# Patient Record
Sex: MALE | Race: WHITE | Employment: OTHER | URBAN - METROPOLITAN AREA
[De-identification: names, ages, dates, MRNs, and addresses within clinical notes are randomized per-mention and may not be internally consistent; named-entity substitution may affect disease eponyms.]

---

## 2017-11-24 ENCOUNTER — HOSPITAL ENCOUNTER (INPATIENT)
Age: 67
LOS: 4 days | Discharge: HOME | DRG: 287 | End: 2017-11-28
Attending: EMERGENCY MEDICINE | Admitting: FAMILY MEDICINE
Payer: MEDICARE

## 2017-11-24 ENCOUNTER — APPOINTMENT (OUTPATIENT)
Dept: GENERAL RADIOLOGY | Age: 67
DRG: 287 | End: 2017-11-24
Attending: EMERGENCY MEDICINE
Payer: MEDICARE

## 2017-11-24 DIAGNOSIS — E87.6 HYPOKALEMIA: ICD-10-CM

## 2017-11-24 DIAGNOSIS — I24.9 ACS (ACUTE CORONARY SYNDROME) (HCC): ICD-10-CM

## 2017-11-24 DIAGNOSIS — I48.91 ATRIAL FIBRILLATION, UNSPECIFIED TYPE (HCC): Primary | ICD-10-CM

## 2017-11-24 PROBLEM — I50.33 DIASTOLIC CHF, ACUTE ON CHRONIC (HCC): Status: ACTIVE | Noted: 2017-11-24

## 2017-11-24 PROBLEM — R77.8 ELEVATED TROPONIN: Status: ACTIVE | Noted: 2017-11-24

## 2017-11-24 PROBLEM — R06.00 DYSPNEA: Status: ACTIVE | Noted: 2017-11-24

## 2017-11-24 LAB
ALBUMIN SERPL-MCNC: 3.5 G/DL (ref 3.4–5)
ALBUMIN/GLOB SERPL: 1.1 {RATIO} (ref 0.8–1.7)
ALP SERPL-CCNC: 70 U/L (ref 45–117)
ALT SERPL-CCNC: 29 U/L (ref 16–61)
ANION GAP SERPL CALC-SCNC: 7 MMOL/L (ref 3–18)
APPEARANCE UR: CLEAR
APTT PPP: >180 SEC (ref 23–36.4)
AST SERPL-CCNC: 28 U/L (ref 15–37)
BACTERIA URNS QL MICRO: ABNORMAL /HPF
BASOPHILS # BLD: 0 K/UL (ref 0–0.06)
BASOPHILS NFR BLD: 1 % (ref 0–2)
BILIRUB SERPL-MCNC: 0.8 MG/DL (ref 0.2–1)
BILIRUB UR QL: NEGATIVE
BNP SERPL-MCNC: 2575 PG/ML (ref 0–900)
BUN SERPL-MCNC: 14 MG/DL (ref 7–18)
BUN/CREAT SERPL: 11 (ref 12–20)
CALCIUM SERPL-MCNC: 8.7 MG/DL (ref 8.5–10.1)
CHLORIDE SERPL-SCNC: 108 MMOL/L (ref 100–108)
CK MB CFR SERPL CALC: 0.6 % (ref 0–4)
CK MB CFR SERPL CALC: 0.8 % (ref 0–4)
CK MB CFR SERPL CALC: ABNORMAL % (ref 0–4)
CK MB SERPL-MCNC: 1.3 NG/ML (ref 5–25)
CK MB SERPL-MCNC: 1.7 NG/ML (ref 5–25)
CK MB SERPL-MCNC: <1 NG/ML (ref 5–25)
CK SERPL-CCNC: 182 U/L (ref 39–308)
CK SERPL-CCNC: 216 U/L (ref 39–308)
CK SERPL-CCNC: 218 U/L (ref 39–308)
CO2 SERPL-SCNC: 24 MMOL/L (ref 21–32)
COLOR UR: YELLOW
CREAT SERPL-MCNC: 1.24 MG/DL (ref 0.6–1.3)
D DIMER PPP FEU-MCNC: 0.48 UG/ML(FEU)
DIFFERENTIAL METHOD BLD: ABNORMAL
EOSINOPHIL # BLD: 0.2 K/UL (ref 0–0.4)
EOSINOPHIL NFR BLD: 3 % (ref 0–5)
EPITH CASTS URNS QL MICRO: ABNORMAL /LPF (ref 0–5)
ERYTHROCYTE [DISTWIDTH] IN BLOOD BY AUTOMATED COUNT: 13.3 % (ref 11.6–14.5)
GLOBULIN SER CALC-MCNC: 3.3 G/DL (ref 2–4)
GLUCOSE SERPL-MCNC: 118 MG/DL (ref 74–99)
GLUCOSE UR STRIP.AUTO-MCNC: NEGATIVE MG/DL
HBA1C MFR BLD: 5 % (ref 4.5–5.6)
HCT VFR BLD AUTO: 49.4 % (ref 36–48)
HGB BLD-MCNC: 17.4 G/DL (ref 13–16)
HGB UR QL STRIP: NEGATIVE
INR PPP: 1 (ref 0.8–1.2)
KETONES UR QL STRIP.AUTO: NEGATIVE MG/DL
LEUKOCYTE ESTERASE UR QL STRIP.AUTO: ABNORMAL
LYMPHOCYTES # BLD: 0.9 K/UL (ref 0.9–3.6)
LYMPHOCYTES NFR BLD: 11 % (ref 21–52)
MAGNESIUM SERPL-MCNC: 1.8 MG/DL (ref 1.6–2.6)
MCH RBC QN AUTO: 31.1 PG (ref 24–34)
MCHC RBC AUTO-ENTMCNC: 35.2 G/DL (ref 31–37)
MCV RBC AUTO: 88.4 FL (ref 74–97)
MONOCYTES # BLD: 0.8 K/UL (ref 0.05–1.2)
MONOCYTES NFR BLD: 9 % (ref 3–10)
NEUTS SEG # BLD: 6.7 K/UL (ref 1.8–8)
NEUTS SEG NFR BLD: 76 % (ref 40–73)
NITRITE UR QL STRIP.AUTO: POSITIVE
PH UR STRIP: 6.5 [PH] (ref 5–8)
PLATELET # BLD AUTO: 186 K/UL (ref 135–420)
PMV BLD AUTO: 11.8 FL (ref 9.2–11.8)
POTASSIUM SERPL-SCNC: 3.4 MMOL/L (ref 3.5–5.5)
PROT SERPL-MCNC: 6.8 G/DL (ref 6.4–8.2)
PROT UR STRIP-MCNC: NEGATIVE MG/DL
PROTHROMBIN TIME: 13 SEC (ref 11.5–15.2)
RBC # BLD AUTO: 5.59 M/UL (ref 4.7–5.5)
RBC #/AREA URNS HPF: ABNORMAL /HPF (ref 0–5)
SODIUM SERPL-SCNC: 139 MMOL/L (ref 136–145)
SP GR UR REFRACTOMETRY: 1.01 (ref 1–1.03)
TROPONIN I SERPL-MCNC: 0.28 NG/ML (ref 0–0.06)
TROPONIN I SERPL-MCNC: 0.28 NG/ML (ref 0–0.06)
TROPONIN I SERPL-MCNC: 0.34 NG/ML (ref 0–0.06)
UROBILINOGEN UR QL STRIP.AUTO: 0.2 EU/DL (ref 0.2–1)
WBC # BLD AUTO: 8.7 K/UL (ref 4.6–13.2)
WBC URNS QL MICRO: ABNORMAL /HPF (ref 0–5)

## 2017-11-24 PROCEDURE — 96360 HYDRATION IV INFUSION INIT: CPT

## 2017-11-24 PROCEDURE — 87077 CULTURE AEROBIC IDENTIFY: CPT | Performed by: FAMILY MEDICINE

## 2017-11-24 PROCEDURE — 74011250637 HC RX REV CODE- 250/637: Performed by: INTERNAL MEDICINE

## 2017-11-24 PROCEDURE — 36415 COLL VENOUS BLD VENIPUNCTURE: CPT | Performed by: INTERNAL MEDICINE

## 2017-11-24 PROCEDURE — 99285 EMERGENCY DEPT VISIT HI MDM: CPT

## 2017-11-24 PROCEDURE — 74011250636 HC RX REV CODE- 250/636: Performed by: FAMILY MEDICINE

## 2017-11-24 PROCEDURE — 93306 TTE W/DOPPLER COMPLETE: CPT

## 2017-11-24 PROCEDURE — 65660000000 HC RM CCU STEPDOWN

## 2017-11-24 PROCEDURE — 74011250637 HC RX REV CODE- 250/637: Performed by: FAMILY MEDICINE

## 2017-11-24 PROCEDURE — 85610 PROTHROMBIN TIME: CPT | Performed by: EMERGENCY MEDICINE

## 2017-11-24 PROCEDURE — 74011250637 HC RX REV CODE- 250/637: Performed by: EMERGENCY MEDICINE

## 2017-11-24 PROCEDURE — 94762 N-INVAS EAR/PLS OXIMTRY CONT: CPT

## 2017-11-24 PROCEDURE — 85730 THROMBOPLASTIN TIME PARTIAL: CPT | Performed by: INTERNAL MEDICINE

## 2017-11-24 PROCEDURE — 74011250636 HC RX REV CODE- 250/636: Performed by: INTERNAL MEDICINE

## 2017-11-24 PROCEDURE — 81001 URINALYSIS AUTO W/SCOPE: CPT | Performed by: EMERGENCY MEDICINE

## 2017-11-24 PROCEDURE — 83735 ASSAY OF MAGNESIUM: CPT | Performed by: EMERGENCY MEDICINE

## 2017-11-24 PROCEDURE — 83036 HEMOGLOBIN GLYCOSYLATED A1C: CPT | Performed by: FAMILY MEDICINE

## 2017-11-24 PROCEDURE — 74011000250 HC RX REV CODE- 250: Performed by: INTERNAL MEDICINE

## 2017-11-24 PROCEDURE — 85025 COMPLETE CBC W/AUTO DIFF WBC: CPT | Performed by: EMERGENCY MEDICINE

## 2017-11-24 PROCEDURE — 87086 URINE CULTURE/COLONY COUNT: CPT | Performed by: FAMILY MEDICINE

## 2017-11-24 PROCEDURE — 82550 ASSAY OF CK (CPK): CPT | Performed by: EMERGENCY MEDICINE

## 2017-11-24 PROCEDURE — 87186 SC STD MICRODIL/AGAR DIL: CPT | Performed by: FAMILY MEDICINE

## 2017-11-24 PROCEDURE — 71010 XR CHEST SNGL V: CPT

## 2017-11-24 PROCEDURE — 80053 COMPREHEN METABOLIC PANEL: CPT | Performed by: EMERGENCY MEDICINE

## 2017-11-24 PROCEDURE — 93005 ELECTROCARDIOGRAM TRACING: CPT

## 2017-11-24 PROCEDURE — 83880 ASSAY OF NATRIURETIC PEPTIDE: CPT | Performed by: EMERGENCY MEDICINE

## 2017-11-24 PROCEDURE — 74011250636 HC RX REV CODE- 250/636: Performed by: EMERGENCY MEDICINE

## 2017-11-24 PROCEDURE — 85379 FIBRIN DEGRADATION QUANT: CPT | Performed by: EMERGENCY MEDICINE

## 2017-11-24 RX ORDER — POTASSIUM CHLORIDE 20 MEQ/1
40 TABLET, EXTENDED RELEASE ORAL
Status: COMPLETED | OUTPATIENT
Start: 2017-11-24 | End: 2017-11-24

## 2017-11-24 RX ORDER — POTASSIUM CHLORIDE 20 MEQ/1
20 TABLET, EXTENDED RELEASE ORAL
Status: COMPLETED | OUTPATIENT
Start: 2017-11-24 | End: 2017-11-24

## 2017-11-24 RX ORDER — METOPROLOL TARTRATE 5 MG/5ML
2.5 INJECTION INTRAVENOUS
Status: COMPLETED | OUTPATIENT
Start: 2017-11-24 | End: 2017-11-24

## 2017-11-24 RX ORDER — HEPARIN SODIUM 10000 [USP'U]/100ML
18-36 INJECTION, SOLUTION INTRAVENOUS
Status: DISCONTINUED | OUTPATIENT
Start: 2017-11-24 | End: 2017-11-25

## 2017-11-24 RX ORDER — CLOPIDOGREL BISULFATE 75 MG/1
75 TABLET ORAL
COMMUNITY
End: 2017-11-28

## 2017-11-24 RX ORDER — OMEPRAZOLE 20 MG/1
40 CAPSULE, DELAYED RELEASE ORAL DAILY
Status: DISCONTINUED | OUTPATIENT
Start: 2017-11-25 | End: 2017-11-28 | Stop reason: HOSPADM

## 2017-11-24 RX ORDER — ROSUVASTATIN CALCIUM 10 MG/1
20 TABLET, COATED ORAL
Status: DISCONTINUED | OUTPATIENT
Start: 2017-11-24 | End: 2017-11-28 | Stop reason: HOSPADM

## 2017-11-24 RX ORDER — DILTIAZEM HYDROCHLORIDE EXTENDED-RELEASE TABLETS 180 MG/1
180 TABLET, EXTENDED RELEASE ORAL DAILY
COMMUNITY
End: 2021-04-09

## 2017-11-24 RX ORDER — OMEPRAZOLE 40 MG/1
40 CAPSULE, DELAYED RELEASE ORAL DAILY
COMMUNITY

## 2017-11-24 RX ORDER — SODIUM CHLORIDE 0.9 % (FLUSH) 0.9 %
5-10 SYRINGE (ML) INJECTION AS NEEDED
Status: DISCONTINUED | OUTPATIENT
Start: 2017-11-24 | End: 2017-11-28 | Stop reason: HOSPADM

## 2017-11-24 RX ORDER — SODIUM CHLORIDE 0.9 % (FLUSH) 0.9 %
5-10 SYRINGE (ML) INJECTION EVERY 8 HOURS
Status: DISCONTINUED | OUTPATIENT
Start: 2017-11-24 | End: 2017-11-28 | Stop reason: HOSPADM

## 2017-11-24 RX ORDER — POTASSIUM CHLORIDE 7.45 MG/ML
10 INJECTION INTRAVENOUS ONCE
Status: COMPLETED | OUTPATIENT
Start: 2017-11-24 | End: 2017-11-24

## 2017-11-24 RX ORDER — METOPROLOL TARTRATE 25 MG/1
25 TABLET, FILM COATED ORAL EVERY 12 HOURS
Status: DISCONTINUED | OUTPATIENT
Start: 2017-11-24 | End: 2017-11-26

## 2017-11-24 RX ORDER — DILTIAZEM HYDROCHLORIDE 240 MG/1
240 CAPSULE, COATED, EXTENDED RELEASE ORAL DAILY
Status: DISCONTINUED | OUTPATIENT
Start: 2017-11-24 | End: 2017-11-24

## 2017-11-24 RX ORDER — IRBESARTAN 150 MG/1
150 TABLET ORAL DAILY
Status: DISCONTINUED | OUTPATIENT
Start: 2017-11-25 | End: 2017-11-28 | Stop reason: HOSPADM

## 2017-11-24 RX ORDER — MAGNESIUM SULFATE HEPTAHYDRATE 40 MG/ML
2 INJECTION, SOLUTION INTRAVENOUS ONCE
Status: COMPLETED | OUTPATIENT
Start: 2017-11-24 | End: 2017-11-24

## 2017-11-24 RX ORDER — LEVOFLOXACIN 5 MG/ML
500 INJECTION, SOLUTION INTRAVENOUS EVERY 24 HOURS
Status: DISCONTINUED | OUTPATIENT
Start: 2017-11-24 | End: 2017-11-28 | Stop reason: HOSPADM

## 2017-11-24 RX ORDER — HEPARIN SODIUM 1000 [USP'U]/ML
80 INJECTION, SOLUTION INTRAVENOUS; SUBCUTANEOUS ONCE
Status: COMPLETED | OUTPATIENT
Start: 2017-11-24 | End: 2017-11-24

## 2017-11-24 RX ADMIN — ROSUVASTATIN CALCIUM 20 MG: 10 TABLET, FILM COATED ORAL at 21:44

## 2017-11-24 RX ADMIN — METOROPROLOL TARTRATE 2.5 MG: 5 INJECTION, SOLUTION INTRAVENOUS at 19:10

## 2017-11-24 RX ADMIN — POTASSIUM CHLORIDE 10 MEQ: 10 INJECTION, SOLUTION INTRAVENOUS at 16:36

## 2017-11-24 RX ADMIN — METOPROLOL TARTRATE 25 MG: 25 TABLET ORAL at 21:43

## 2017-11-24 RX ADMIN — LEVOFLOXACIN 500 MG: 5 INJECTION, SOLUTION INTRAVENOUS at 16:35

## 2017-11-24 RX ADMIN — HEPARIN SODIUM 18 UNITS/KG/HR: 10000 INJECTION, SOLUTION INTRAVENOUS at 10:11

## 2017-11-24 RX ADMIN — MAGNESIUM SULFATE HEPTAHYDRATE 2 G: 40 INJECTION, SOLUTION INTRAVENOUS at 16:36

## 2017-11-24 RX ADMIN — POTASSIUM CHLORIDE 20 MEQ: 20 TABLET, EXTENDED RELEASE ORAL at 10:13

## 2017-11-24 RX ADMIN — DILTIAZEM HYDROCHLORIDE 240 MG: 240 CAPSULE, COATED, EXTENDED RELEASE ORAL at 15:09

## 2017-11-24 RX ADMIN — POTASSIUM CHLORIDE 40 MEQ: 20 TABLET, EXTENDED RELEASE ORAL at 16:35

## 2017-11-24 RX ADMIN — Medication 10 ML: at 15:09

## 2017-11-24 RX ADMIN — SODIUM CHLORIDE 1000 ML: 900 INJECTION, SOLUTION INTRAVENOUS at 09:12

## 2017-11-24 RX ADMIN — HEPARIN SODIUM 7980 UNITS: 1000 INJECTION, SOLUTION INTRAVENOUS; SUBCUTANEOUS at 10:12

## 2017-11-24 NOTE — H&P
History & Physical    Patient: Christina Enriquez MRN: [id number]  CSN: [id number]    YOB: 1950  Age: 79 y.o. Sex: male      DOA: 11/24/2017  Primary Care Provider:  Luba Esteban MD      Assessment/Plan     Patient Active Problem List   Diagnosis Code    Near syncope R55    UTI (urinary tract infection) N39.0    Essential hypertension, benign I10    Esophageal reflux K21.9    Calculus of kidney N20.0    CHF (congestive heart failure) ([facility]) I50.9    CHF exacerbation ([facility]) I50.9    Cardiomyopathy ([facility] I42.9    GERD (gastroesophageal reflux disease) K21.9    NSTEMI (non-ST elevated myocardial infarction) ([facility] I21.4    CAD (coronary artery disease) N40.20    Diastolic CHF, chronic ([facility]) I50.32    Atrial fibrillation ([facility]) I48.91    Dyspnea A32.29    Diastolic CHF, acute on chronic ([facility]) I50.33    Elevated troponin R74.8     Admit to Tele    Dyspnea 2nd to CHF exac with elevated trop: O2 support, echo stat, serial CE    Acute on chronic diastolic CHF: monitor In/out, stat echo, may need cath consult to [facility]. Discussed the case with Dr. Sonam Hoyt onset of A. Fib: On Heparin drip, up to dose of cardizem. Hypokalemia: K+ repletion, will check Mg. UTI: IV levaquin, will send the urine for Cx    CAD, S/P stents: On heparin, ARB, cardizem, crestor Consult for cards    Supportive care    GI prophylaxis    Full code    Estimated length of stay : 2-3 days    ACP: AC: I have had a discussion with patient regarding code status. Particularly, described potential options in event of cardiac or respiratory arrest. Pt and his wife have no living will/will. Total time of consult: 8 min    CC: \" I have been having trouble breathing and chest congested for 3 days\"       HPI:     Christina Enriquez is a 79 y.o. male with PMH of HTN, CAD, S/P stents X2, diastolic CHF, HLP, GERD who came with dyspnea and chest congested for 3 days.  He states that his shortness of breathing worsening at night when laying flat, . Chest congested with wheezing but no fevers/chills. It was associated with nausea, no vomiting. Denies CP/numbness/tingling. He woke up this am because he could not be able to breathing which prompted his ER visit. At ER,  EKG revealed with new onset A fib, blood tests revealed with elevated troponin with known cardiac history. Potassium was low, heparin started. He is to admit for further care.      Past Medical History:   Diagnosis Date    Cancer ([facility] [address]     basal cell    Cholestanol storage disease     GERD (gastroesophageal reflux disease)     well controlled with meds    Hypercholesteremia     Hypertension 20734    5yrs    Kidney stones     Nausea & vomiting     Pneumonia        Past Surgical History:   Procedure Laterality Date    CARDIAC SURG PROCEDURE UNLIST      CORONARY STENT EA ADDL VESSEL  12/4/2015    CORONARY STENT SINGLE W/PTCA  12/4/2015    HX ADENOIDECTOMY      HX APPENDECTOMY      HX CERVICAL FUSION  05/02/2012    HX HEENT  2013    sinus surgery    HX OTHER SURGICAL      basal cell removal from forehead    HX OTHER SURGICAL  2012    varicose vein surgery    HX TONSILLECTOMY      HX UROLOGICAL  4-13    lithotripsy    LEFT HEART PERCUTANEOUS  12/4/2015    BRYAN CORONARY ARTERIOGRAPH  12/4/2015       Family History   Problem Relation Age of Onset    Malignant Hyperthermia Neg Hx     Pseudocholinesterase Deficiency Neg Hx     Delayed Awakening Neg Hx     Post-op Nausea/Vomiting Neg Hx     Emergence Delirium Neg Hx        Social History     Social History    Marital status: UNKNOWN     Spouse name: N/A    Number of children: N/A    Years of education: N/A     Social History Main Topics    Smoking status: Never Smoker    Smokeless tobacco: Never Used    Alcohol use No    Drug use: No    Sexual activity: Not on file     Other Topics Concern    Not on file     Social History Narrative       Prior to Admission medications    Medication Sig Start Date End Date Taking? Authorizing Provider   dilTIAZem ER (CARDIZEM LA) 180 mg Tb24 tablet Take 180 mg by mouth daily. Yes Gela Tinajero MD   clopidogrel (PLAVIX) 75 mg tab Take 75 mg by mouth. Yes Gela Tinajero MD   omeprazole (PRILOSEC) 40 mg capsule Take 40 mg by mouth daily. Yes Gela Tinajero MD   Cetirizine (ZYRTEC) 10 mg cap Take 10 mg by mouth. Yes Gela Tinajero MD   aspirin 81 mg chewable tablet Take 1 Tab by mouth daily. 12/3/15  Yes Kendal Sykes MD   rosuvastatin (CRESTOR) 20 mg tablet Take 20 mg by mouth nightly. Yes Gela Tinajero MD   furosemide (LASIX) 40 mg tablet Take 2 Tabs by mouth daily. 3/11/15  Yes Althea Underwood III, MD   irbesartan (AVAPRO) 150 mg tablet Take 1 tablet by mouth daily. 1/8/15  Yes Odalis Porter MD       Allergies   Allergen Reactions    Pcn [Penicillins] Rash       Review of Systems  Gen: No fever, chills, +malaise, weight loss/gain. Heent: No headache, rhinorrhea, epistaxis, ear pain, hearing loss, sinus pain, neck pain/stiffness, sore throat. Heart: No chest pain, palpitations, +JURADO, pnd, or orthopnea. Resp: No cough, hemoptysis, wheezing. + shortness of breath. GI: No nausea, vomiting, diarrhea, constipation, melena or hematochezia. : No urinary obstruction, dysuria or hematuria. Derm: No rash, new skin lesion or pruritis. Musc/skeletal: no bone or joint complains. Vasc: No edema, cyanosis or claudication. Endo: No heat/cold intolerance, no polyuria,polydipsia or polyphagia. Neuro: No unilateral weakness, numbness, tingling. No seizures. Heme: No easy bruising or bleeding.           Physical Exam:     Physical Exam:  Visit Vitals    BP (!) 148/98 (BP 1 Location: Left arm, BP Patient Position: At rest)    Pulse (!) 109    Temp 98.2 °F (36.8 °C)    Resp 19    Ht 6' (1.829 m)    Wt 99.8 kg (220 lb)    SpO2 96%    BMI 29.84 kg/m2      O2 Device: Room air    Temp (24hrs), Av.2 °F (36.8 °C), Min:97.9 °F (36.6 °C), Max:98.7 °F (37.1 °C)    701 - 11/24 1900  In: 0   Out: 480 [Urine:480]        General:  Awake, cooperative, uncomfortable. Head:  Normocephalic, without obvious abnormality, atraumatic. Eyes:  Conjunctivae/corneas clear, sclera anicteric, PERRL, EOMs intact. Nose: Nares normal. No drainage or sinus tenderness. Throat: Lips, mucosa, and tongue normal.    Neck: Supple, symmetrical, trachea midline, no adenopathy. Lungs:   Clear to auscultation bilaterally. Heart:  Irregular, irregular     Abdomen: Soft, non-tender. Bowel sounds normal. No masses,  No organomegaly. Extremities: Extremities normal, atraumatic, no cyanosis or edema. Capillary refill normal.   Pulses: 2+ and symmetric all extremities. Skin: Skin color pink/pale/mottled/flushed, turgor normal. No rashes or lesions   Neurologic: CNII-XII intact. No focal motor or sensory deficit.        Labs Reviewed:    Recent Results (from the past 24 hour(s))   EKG, 12 LEAD, INITIAL    Collection Time: 11/24/17  8:49 AM   Result Value Ref Range    Ventricular Rate 112 BPM    Atrial Rate 85 BPM    QRS Duration 98 ms    Q-T Interval 340 ms    QTC Calculation (Bezet) 464 ms    Calculated R Axis 31 degrees    Calculated T Axis 71 degrees    Diagnosis       Atrial fibrillation with rapid ventricular response  Nonspecific T wave abnormality , probably digitalis effect  Abnormal ECG  When compared with ECG of 01-DEC-2015 23:48,  Atrial fibrillation has replaced Sinus rhythm  T wave inversion no longer evident in Lateral leads     CBC WITH AUTOMATED DIFF    Collection Time: 11/24/17  8:50 AM   Result Value Ref Range    WBC 8.7 4.6 - 13.2 K/uL    RBC 5.59 (H) 4.70 - 5.50 M/uL    HGB 17.4 (H) 13.0 - 16.0 g/dL    HCT 49.4 (H) 36.0 - 48.0 %    MCV 88.4 74.0 - 97.0 FL    MCH 31.1 24.0 - 34.0 PG    MCHC 35.2 31.0 - 37.0 g/dL    RDW 13.3 11.6 - 14.5 %    PLATELET 452 738 - 579 K/uL    MPV 11.8 9.2 - 11.8 FL    NEUTROPHILS 76 (H) 40 - 73 %    LYMPHOCYTES 11 (L) 21 - 52 %    MONOCYTES 9 3 - 10 %    EOSINOPHILS 3 0 - 5 %    BASOPHILS 1 0 - 2 %    ABS. NEUTROPHILS 6.7 1.8 - 8.0 K/UL    ABS. LYMPHOCYTES 0.9 0.9 - 3.6 K/UL    ABS. MONOCYTES 0.8 0.05 - 1.2 K/UL    ABS. EOSINOPHILS 0.2 0.0 - 0.4 K/UL    ABS. BASOPHILS 0.0 0.0 - 0.06 K/UL    DF AUTOMATED     PROTHROMBIN TIME + INR    Collection Time: 11/24/17  8:50 AM   Result Value Ref Range    Prothrombin time 13.0 11.5 - 15.2 sec    INR 1.0 0.8 - 1.2     D DIMER    Collection Time: 11/24/17  8:50 AM   Result Value Ref Range    D DIMER 0.48 (H) <0.46 ug/ml(FEU)   METABOLIC PANEL, COMPREHENSIVE    Collection Time: 11/24/17  8:50 AM   Result Value Ref Range    Sodium 139 136 - 145 mmol/L    Potassium 3.4 (L) 3.5 - 5.5 mmol/L    Chloride 108 100 - 108 mmol/L    CO2 24 21 - 32 mmol/L    Anion gap 7 3.0 - 18 mmol/L    Glucose 118 (H) 74 - 99 mg/dL    BUN 14 7.0 - 18 MG/DL    Creatinine 1.24 0.6 - 1.3 MG/DL    BUN/Creatinine ratio 11 (L) 12 - 20      GFR est AA >60 >60 ml/min/1.73m2    GFR est non-AA 58 (L) >60 ml/min/1.73m2    Calcium 8.7 8.5 - 10.1 MG/DL    Bilirubin, total 0.8 0.2 - 1.0 MG/DL    ALT (SGPT) 29 16 - 61 U/L    AST (SGOT) 28 15 - 37 U/L    Alk.  phosphatase 70 45 - 117 U/L    Protein, total 6.8 6.4 - 8.2 g/dL    Albumin 3.5 3.4 - 5.0 g/dL    Globulin 3.3 2.0 - 4.0 g/dL    A-G Ratio 1.1 0.8 - 1.7     MAGNESIUM    Collection Time: 11/24/17  8:50 AM   Result Value Ref Range    Magnesium 1.8 1.6 - 2.6 mg/dL   CARDIAC PANEL,(CK, CKMB & TROPONIN)    Collection Time: 11/24/17  8:50 AM   Result Value Ref Range     39 - 308 U/L    CK - MB 1.7 <3.6 ng/ml    CK-MB Index 0.8 0.0 - 4.0 %    Troponin-I, Qt. 0.34 (H) 0.00 - 0.06 NG/ML   NT-PRO BNP    Collection Time: 11/24/17  8:50 AM   Result Value Ref Range    NT pro-BNP 2575 (H) 0 - 900 PG/ML   URINALYSIS W/ RFLX MICROSCOPIC    Collection Time: 11/24/17  9:50 AM   Result Value Ref Range    Color YELLOW      Appearance CLEAR      Specific gravity 1.009 1.005 - 1.030      pH (UA) 6.5 5.0 - 8.0      Protein NEGATIVE  NEG mg/dL    Glucose NEGATIVE  NEG mg/dL    Ketone NEGATIVE  NEG mg/dL    Bilirubin NEGATIVE  NEG      Blood NEGATIVE  NEG      Urobilinogen 0.2 0.2 - 1.0 EU/dL    Nitrites POSITIVE (A) NEG      Leukocyte Esterase SMALL (A) NEG     URINE MICROSCOPIC ONLY    Collection Time: 11/24/17  9:50 AM   Result Value Ref Range    WBC 1 to 4 0 - 5 /hpf    RBC NONE 0 - 5 /hpf    Epithelial cells FEW 0 - 5 /lpf    Bacteria 4+ (A) NEG /hpf       Procedures/imaging: see electronic medical records for all procedures/Xrays and details which were not copied into this note but were reviewed prior to creation of Plan        CC: Wilfredo Villalpando MD

## 2017-11-24 NOTE — ED TRIAGE NOTES
C/o congestion, shortness of breath for 3 days that has gotten worse. Denies fever. Sepsis Screening completed    (  )Patient meets SIRS criteria. (x  )Patient does not meet SIRS criteria.       SIRS Criteria is achieved when two or more of the following are present   Temperature < 96.8°F (36°C) or > 100.9°F (38.3°C)   Heart Rate > 90 beats per minute   Respiratory Rate > 20 breaths per minute   WBC count > 12,000 or <4,000 or > 10% bands

## 2017-11-24 NOTE — ROUTINE PROCESS
TRANSFER - OUT REPORT:    Verbal report given to Tiffany Cook RN(name) on Brian  being transferred to tele(unit) for routine progression of care       Report consisted of patients Situation, Background, Assessment and   Recommendations(SBAR). Information from the following report(s) SBAR, ED Summary and MAR was reviewed with the receiving nurse. Lines:   Peripheral IV 11/24/17 Left Antecubital (Active)   Site Assessment Clean, dry, & intact 11/24/2017  8:56 AM   Phlebitis Assessment 0 11/24/2017  8:56 AM   Infiltration Assessment 0 11/24/2017  8:56 AM   Dressing Status Clean, dry, & intact 11/24/2017  8:56 AM   Dressing Type Transparent 11/24/2017  8:56 AM   Hub Color/Line Status Pink 11/24/2017  8:56 AM        Opportunity for questions and clarification was provided.       Patient transported with:   Monitor  Registered Nurse  Tech

## 2017-11-24 NOTE — ED PROVIDER NOTES
EMERGENCY DEPARTMENT HISTORY AND PHYSICAL EXAM    Date: 11/24/2017  Patient Name: Otis Waller    History of Presenting Illness     Chief Complaint   Patient presents with    Shortness of Breath         History Provided By: Patient    Chief Complaint: SOB  Duration: 3 Days  Timing:  Acute  Associated Symptoms: chest congestion, dry cough, rhinorrhea, and wheezing    Additional History (Context):   8:41 AM  Otis Waller is a 79 y.o. male with PMHX kidney stones, HTN, GERD, basal cell cancer, and hypercholesteremia who presents with to the emergency department C/O SOB onset 3 days ago that worsened this morning. Associated sxs include chest congestion, dry cough, rhinorrhea, and wheezing. SOB and wheezing is exacerbated when laying flat. Pt is taking Aspirin and Plavix and Lasix (20 mg once daily). Pt denies fever, congestion, leg swelling, tobacco/EtOH use, Hx of A Fib or irregular heart rates, Hx of blood clots, recent long distance travel, and any other sxs or complaints. PCP: Heather Alvarez MD    Current Facility-Administered Medications   Medication Dose Route Frequency Provider Last Rate Last Dose    potassium chloride (K-DUR, KLOR-CON) SR tablet 20 mEq  20 mEq Oral NOW Indira Allen MD        heparin (porcine) 1,000 unit/mL injection 7,980 Units  80 Units/kg IntraVENous ONCE Indira Allen MD        heparin (porcine) 25,000 units in 0.45% saline 250 ml infusion  18-36 Units/kg/hr IntraVENous TITRATE Indira Allen MD         Current Outpatient Prescriptions   Medication Sig Dispense Refill    dilTIAZem ER (CARDIZEM LA) 180 mg Tb24 tablet Take 180 mg by mouth daily.  clopidogrel (PLAVIX) 75 mg tab Take 75 mg by mouth.  omeprazole (PRILOSEC) 40 mg capsule Take 40 mg by mouth daily.  Cetirizine (ZYRTEC) 10 mg cap Take 10 mg by mouth.  aspirin 81 mg chewable tablet Take 1 Tab by mouth daily. 90 Tab 1    rosuvastatin (CRESTOR) 20 mg tablet Take 20 mg by mouth nightly.  furosemide (LASIX) 40 mg tablet Take 2 Tabs by mouth daily. 60 Tab 1    irbesartan (AVAPRO) 150 mg tablet Take 1 tablet by mouth daily. 20 tablet 0       Past History     Past Medical History:  Past Medical History:   Diagnosis Date    Cancer ([address]     basal cell    Cholestanol storage disease     GERD (gastroesophageal reflux disease)     well controlled with meds    Hypercholesteremia     Hypertension 97583    5yrs    Kidney stones     Nausea & vomiting     Pneumonia        Past Surgical History:  Past Surgical History:   Procedure Laterality Date    CARDIAC SURG PROCEDURE UNLIST      CORONARY STENT EA ADDL VESSEL  12/4/2015    CORONARY STENT SINGLE W/PTCA  12/4/2015    HX ADENOIDECTOMY      HX APPENDECTOMY      HX CERVICAL FUSION  05/02/2012    HX HEENT  2013    sinus surgery    HX OTHER SURGICAL      basal cell removal from forehead    HX OTHER SURGICAL  2012    varicose vein surgery    HX TONSILLECTOMY      HX UROLOGICAL  4-13    lithotripsy    LEFT HEART PERCUTANEOUS  12/4/2015    BRYAN CORONARY ARTERIOGRAPH  12/4/2015       Family History:  Family History   Problem Relation Age of Onset    Malignant Hyperthermia Neg Hx     Pseudocholinesterase Deficiency Neg Hx     Delayed Awakening Neg Hx     Post-op Nausea/Vomiting Neg Hx     Emergence Delirium Neg Hx        Social History:  Social History   Substance Use Topics    Smoking status: Never Smoker    Smokeless tobacco: Never Used    Alcohol use No       Allergies: Allergies   Allergen Reactions    Pcn [Penicillins] Rash         Review of Systems   Review of Systems   Constitutional: Negative for chills and fever. HENT: Positive for rhinorrhea. Negative for congestion. Respiratory: Positive for cough, shortness of breath and wheezing. Cardiovascular: Negative for leg swelling.        (+) Chest congestion   All other systems reviewed and are negative.       Physical Exam     Vitals:    11/24/17 0844   BP: (!) 150/98 Pulse: (!) 115   Resp: 12   Temp: 98.1 °F (36.7 °C)   SpO2: 97%   Weight: 99.8 kg (220 lb)   Height: 6' (1.829 m)     Physical Exam   Nursing note and vitals reviewed. Constitutional: Well appearing, no acute distress  Head: Normocephalic, Atraumatic  Eyes: EOMI  Neck: Supple  Cardiovascular: Irregularly irregular rate and tachycardia, no murmurs, rubs, or gallops  Chest: Normal work of breathing and chest excursion bilaterally  Lungs: Clear to ausculation bilaterally  Abdomen: Soft, non tender, non distended, normoactive bowel sounds  Back: No evidence of trauma or deformity  Extremities: No evidence of trauma or deformity, no LE edema  Skin: Warm and dry, normal cap refill  Neuro: Alert and appropriate, CN intact, normal speech, normal coordination  Psychiatric: Normal mood and affect      Diagnostic Study Results     Labs -     Recent Results (from the past 12 hour(s))   CBC WITH AUTOMATED DIFF    Collection Time: 11/24/17  8:50 AM   Result Value Ref Range    WBC 8.7 4.6 - 13.2 K/uL    RBC 5.59 (H) 4.70 - 5.50 M/uL    HGB 17.4 (H) 13.0 - 16.0 g/dL    HCT 49.4 (H) 36.0 - 48.0 %    MCV 88.4 74.0 - 97.0 FL    MCH 31.1 24.0 - 34.0 PG    MCHC 35.2 31.0 - 37.0 g/dL    RDW 13.3 11.6 - 14.5 %    PLATELET 816 304 - 330 K/uL    MPV 11.8 9.2 - 11.8 FL    NEUTROPHILS 76 (H) 40 - 73 %    LYMPHOCYTES 11 (L) 21 - 52 %    MONOCYTES 9 3 - 10 %    EOSINOPHILS 3 0 - 5 %    BASOPHILS 1 0 - 2 %    ABS. NEUTROPHILS 6.7 1.8 - 8.0 K/UL    ABS. LYMPHOCYTES 0.9 0.9 - 3.6 K/UL    ABS. MONOCYTES 0.8 0.05 - 1.2 K/UL    ABS. EOSINOPHILS 0.2 0.0 - 0.4 K/UL    ABS.  BASOPHILS 0.0 0.0 - 0.06 K/UL    DF AUTOMATED     PROTHROMBIN TIME + INR    Collection Time: 11/24/17  8:50 AM   Result Value Ref Range    Prothrombin time 13.0 11.5 - 15.2 sec    INR 1.0 0.8 - 1.2     D DIMER    Collection Time: 11/24/17  8:50 AM   Result Value Ref Range    D DIMER 0.48 (H) <0.46 ug/ml(FEU)   METABOLIC PANEL, COMPREHENSIVE    Collection Time: 11/24/17  8:50 AM Result Value Ref Range    Sodium 139 136 - 145 mmol/L    Potassium 3.4 (L) 3.5 - 5.5 mmol/L    Chloride 108 100 - 108 mmol/L    CO2 24 21 - 32 mmol/L    Anion gap 7 3.0 - 18 mmol/L    Glucose 118 (H) 74 - 99 mg/dL    BUN 14 7.0 - 18 MG/DL    Creatinine 1.24 0.6 - 1.3 MG/DL    BUN/Creatinine ratio 11 (L) 12 - 20      GFR est AA >60 >60 ml/min/1.73m2    GFR est non-AA 58 (L) >60 ml/min/1.73m2    Calcium 8.7 8.5 - 10.1 MG/DL    Bilirubin, total 0.8 0.2 - 1.0 MG/DL    ALT (SGPT) 29 16 - 61 U/L    AST (SGOT) 28 15 - 37 U/L    Alk. phosphatase 70 45 - 117 U/L    Protein, total 6.8 6.4 - 8.2 g/dL    Albumin 3.5 3.4 - 5.0 g/dL    Globulin 3.3 2.0 - 4.0 g/dL    A-G Ratio 1.1 0.8 - 1.7     MAGNESIUM    Collection Time: 11/24/17  8:50 AM   Result Value Ref Range    Magnesium 1.8 1.6 - 2.6 mg/dL   CARDIAC PANEL,(CK, CKMB & TROPONIN)    Collection Time: 11/24/17  8:50 AM   Result Value Ref Range     39 - 308 U/L    CK - MB 1.7 <3.6 ng/ml    CK-MB Index 0.8 0.0 - 4.0 %    Troponin-I, Qt. 0.34 (H) 0.00 - 0.06 NG/ML   NT-PRO BNP    Collection Time: 11/24/17  8:50 AM   Result Value Ref Range    NT pro-BNP 2575 (H) 0 - 900 PG/ML   URINALYSIS W/ RFLX MICROSCOPIC    Collection Time: 11/24/17  9:50 AM   Result Value Ref Range    Color YELLOW      Appearance CLEAR      Specific gravity 1.009 1.005 - 1.030      pH (UA) 6.5 5.0 - 8.0      Protein NEGATIVE  NEG mg/dL    Glucose NEGATIVE  NEG mg/dL    Ketone NEGATIVE  NEG mg/dL    Bilirubin NEGATIVE  NEG      Blood NEGATIVE  NEG      Urobilinogen 0.2 0.2 - 1.0 EU/dL    Nitrites POSITIVE (A) NEG      Leukocyte Esterase SMALL (A) NEG         Radiologic Studies -   XR CHEST SNGL V   Final Result   IMPRESSION:     No acute pulmonary process identified. As read by the radiologist.     CT Results  (Last 48 hours)    None        CXR Results  (Last 48 hours)               11/24/17 0902  XR CHEST SNGL V Final result    Impression:  IMPRESSION:       No acute pulmonary process identified. Narrative:  EXAM: One-view chest       CLINICAL HISTORY: Short of breath ,       COMPARISON: None       FINDINGS:       Frontal view of the chest demonstrate clear lungs. Cardiac silhouette is normal   in size and contour. No acute bony or soft tissue abnormality. Medical Decision Making   I am the first provider for this patient. I reviewed the vital signs, available nursing notes, past medical history, past surgical history, family history and social history. Vital Signs-Reviewed the patient's vital signs. Pulse Oximetry Analysis - 97% on RA     Cardiac Monitor:  Rate: 116 bpm  Rhythm: Atrial Fibrillation    EKG interpretation: (Preliminary)  Rhythm: Atrial fibrillation with RVR. Rate 112 bpm; QRS duration: 98 ms  EKG read by Karina Lee MD at 8:57 AM     Records Reviewed: Nursing Notes and Old Medical Records   2 stents placed by Susana Andrews MD in December 2015. Procedures:  Procedures    ED Course:   8:41 AM Initial assessment performed. The patients presenting problems have been discussed, and they are in agreement with the care plan formulated and outlined with them. I have encouraged them to ask questions as they arise throughout their visit. 9:39 AM Discussed patient's history, exam, and available diagnostics results with Susana Andrews MD, Cardiology, who reccomends starting a heparin drip and bringing in to hospitalist for further evaluation. 10:02 AM Discussed patient's history, exam, and available diagnostics results with Venora Kawasaki, MD, Hospitalist, who agrees to admit pt to telemetry. Diagnosis and Disposition     Discussion:  79 y.o male presents with new onset A fib and elevated troponin with known cardiac history. Potassium was low and repleated. Discussed with cardiology who reccomended heparin drip and admission for further evaluation.     Critical Care Time:   I have spent 36  minutes of critical care time involved in lab review, consultations with specialist, family decision-making, and documentation. During this entire length of time I was immediately available to the patient. Critical Care: The reason for providing this level of medical care for this critically ill patient was due a critical illness that impaired one or more vital organ systems such that there was a high probability of imminent or life threatening deterioration in the patients condition. This care involved high complexity decision making to assess, manipulate, and support vital system functions, to treat this degreee vital organ system failure and to prevent further life threatening deterioration of the patients condition. Core Measures:  For Hospitalized Patients:    1. Hospitalization Decision Time:  The decision to hospitalize the patient was made by Olayinka Love MD at 9:42 AM on 11/24/2017    2. Aspirin: Aspirin was not given because the patient did not present with a stroke at the time of their Emergency Department evaluation    10:02 AM  Patient is being admitted to the hospital by Nadege Manley MD. The results of their tests and reasons for their admission have been discussed with them and/or available family. They convey agreement and understanding for the need to be admitted and for their admission diagnosis. CONDITIONS ON ADMISSION:  Sepsis is not present at the time of admission. Deep Vein Thrombosis is not present at the time of admission. Thrombosis is not present at the time of admission. Urinary Tract Infection is not present at the time of admission. Pneumonia is not present at the time of admission. MRSA is not present at the time of admission. Wound infection is not present at the time of admission. Pressure Ulcer is not present at the time of admission. CLINICAL IMPRESSION:    1. Atrial fibrillation, unspecified type ([facility] [address]    2. Hypokalemia    3. ACS (acute coronary syndrome) ([facility]        _______________________________    Attestations:   This note is prepared by Meghan Costa, acting as Scribe for Olayinka Love MD.    Olayinka Love MD:  The scribe's documentation has been prepared under my direction and personally reviewed by me in its entirety.   I confirm that the note above accurately reflects all work, treatment, procedures, and medical decision making performed by me.  _______________________________

## 2017-11-24 NOTE — IP AVS SNAPSHOT
[address] [place] [id number] 
[phone] Patient: Sheri Sandoval MRN: [id number] ZFK:[id number] About your hospitalization You were admitted on:  November 24, 2017 You last received care in the:  [facility] You were discharged on:  November 28, 2017 Why you were hospitalized Your primary diagnosis was:  Dyspnea Your diagnoses also included:  Atrial Fibrillation (Hcc), Essential Hypertension, Benign, Cad (Coronary Artery Disease), Diastolic Chf, Acute On Chronic (Hcc), Cardiomyopathy (Hcc), Uti (Urinary Tract Infection), Elevated Troponin Things You Need To Do (next 8 weeks) Call Wilfredo Villalpando MD today For follow up regarding recent hospitalization Phone:  [phone] Where:  [address] Call Darby Avina MD today For follow up regarding recent hospitalization Phone:  [phone] Where:  [address], [address], [address] Discharge Orders None A check layton indicates which time of day the medication should be taken. My Medications STOP taking these medications PLAVIX 75 mg Tab Generic drug:  clopidogrel TAKE these medications as instructed Instructions Each Dose to Equal  
 Morning Noon Evening Bedtime  
 aspirin 81 mg chewable tablet Your last dose was:  11/28 Your next dose is:  11/29 Take 1 Tab by mouth daily. 81 mg  
    
  
   
   
   
  
 CRESTOR 20 mg tablet Generic drug:  rosuvastatin Your next dose is:  11/28 WITH DINNER Take 20 mg by mouth nightly. 20 mg  
    
   
   
  
   
  
 dilTIAZem  mg Tb24 tablet Commonly known as:  CARDIZEM LA Your last dose was:  11/28 Your next dose is:  11/29 Take 180 mg by mouth daily. 180 mg  
    
  
   
   
   
  
 furosemide 40 mg tablet Commonly known as:  LASIX Your last dose was:  11/28 Your next dose is:  11/29 Take 1 Tab by mouth daily. 40 mg  
    
  
   
   
   
  
 irbesartan 150 mg tablet Commonly known as:  AVAPRO Your last dose was:  11/28 Your next dose is:  11/29 Take 1 tablet by mouth daily. 150 mg  
    
  
   
   
   
  
 levoFLOXacin 250 mg tablet Commonly known as:  Paulett Murrain Your next dose is:  11/28 IN THE MORNING FOR FOUR DAYS Take 1 Tab by mouth daily for 4 doses. 250 mg  
    
  
   
   
   
  
 metoprolol tartrate 50 mg tablet Commonly known as:  LOPRESSOR Your last dose was:  11/28 MORNING Your next dose is:  11/28 WITH DINNER Take 1 Tab by mouth two (2) times a day. 50 mg  
    
  
   
   
  
   
  
 PriLOSEC 40 mg capsule Generic drug:  omeprazole Your last dose was:  11/28 Your next dose is:  11/29 Take 40 mg by mouth daily. 40 mg  
    
  
   
   
   
  
 rivaroxaban 20 mg Tab tablet Commonly known as:  Lonzell Lust Your next dose is:  11/28 TAKE WITH DINNER Take 1 Tab by mouth daily (with dinner). 20 mg  
    
   
   
  
   
  
 spironolactone 25 mg tablet Commonly known as:  ALDACTONE Start taking on:  11/29/2017 Take 1 Tab by mouth daily. 25 mg  
    
  
   
   
   
  
 ZyrTEC 10 mg Cap Generic drug:  Cetirizine Your next dose is:  11/29 Take 10 mg by mouth. 10 mg Where to Get Your Medications These medications were sent to [address] [place] [address] Hours:  24-hours Phone:  [phone]  
  furosemide 40 mg tablet  
 levoFLOXacin 250 mg tablet  
 metoprolol tartrate 50 mg tablet  
 rivaroxaban 20 mg Tab tablet  
 spironolactone 25 mg tablet Discharge Instructions Atrial Fibrillation: Care Instructions Your Care Instructions Atrial fibrillation is an irregular and often fast heartbeat. Treating this condition is important for several reasons. It can cause blood clots, which can travel from your heart to your brain and cause a stroke. If you have a fast heartbeat, you may feel lightheaded, dizzy, and weak. An irregular heartbeat can also increase your risk for heart failure. Atrial fibrillation is often the result of another heart condition, such as high blood pressure or coronary artery disease. Making changes to improve your heart condition will help you stay healthy and active. Follow-up care is a key part of your treatment and safety. Be sure to make and go to all appointments, and call your doctor if you are having problems. It's also a good idea to know your test results and keep a list of the medicines you take. How can you care for yourself at home? Medicines ? · Take your medicines exactly as prescribed. Call your doctor if you think you are having a problem with your medicine. You will get more details on the specific medicines your doctor prescribes. ? · If your doctor has given you a blood thinner to prevent a stroke, be sure you get instructions about how to take your medicine safely. Blood thinners can cause serious bleeding problems. ? · Do not take any vitamins, over-the-counter drugs, or herbal products without talking to your doctor first. ? Lifestyle changes ? · Do not smoke. Smoking can increase your chance of a stroke and heart attack. If you need help quitting, talk to your doctor about stop-smoking programs and medicines. These can increase your chances of quitting for good. ? · Eat a heart-healthy diet. ? · Stay at a healthy weight. Lose weight if you need to.  
? · Limit alcohol to 2 drinks a day for men and 1 drink a day for women. Too much alcohol can cause health problems. ? · Avoid colds and flu. Get a pneumococcal vaccine shot.  If you have had one before, ask your doctor whether you need another dose. Get a flu shot every year. If you must be around people with colds or flu, wash your hands often. Activity ? · If your doctor recommends it, get more exercise. Walking is a good choice. Bit by bit, increase the amount you walk every day. Try for at least 30 minutes on most days of the week. You also may want to swim, bike, or do other activities. Your doctor may suggest that you join a cardiac rehabilitation program so that you can have help increasing your physical activity safely. ? · Start light exercise if your doctor says it is okay. Even a small amount will help you get stronger, have more energy, and manage stress. Walking is an easy way to get exercise. Start out by walking a little more than you did in the hospital. Gradually increase the amount you walk. ? · When you exercise, watch for signs that your heart is working too hard. You are pushing too hard if you cannot talk while you are exercising. If you become short of breath or dizzy or have chest pain, sit down and rest immediately. ? · Check your pulse regularly. Place two fingers on the artery at the palm side of your wrist, in line with your thumb. If your heartbeat seems uneven or fast, talk to your doctor. When should you call for help? Call 911 anytime you think you may need emergency care. For example, call if: 
? · You have symptoms of a heart attack. These may include: ¨ Chest pain or pressure, or a strange feeling in the chest. 
¨ Sweating. ¨ Shortness of breath. ¨ Nausea or vomiting. ¨ Pain, pressure, or a strange feeling in the back, neck, jaw, or upper belly or in one or both shoulders or arms. ¨ Lightheadedness or sudden weakness. ¨ A fast or irregular heartbeat. After you call 911, the  may tell you to chew 1 adult-strength or 2 to 4 low-dose aspirin. Wait for an ambulance. Do not try to drive yourself. ? · You have symptoms of a stroke. These may include: 
¨ Sudden numbness, tingling, weakness, or loss of movement in your face, arm, or leg, especially on only one side of your body. ¨ Sudden vision changes. ¨ Sudden trouble speaking. ¨ Sudden confusion or trouble understanding simple statements. ¨ Sudden problems with walking or balance. ¨ A sudden, severe headache that is different from past headaches. ? · You passed out (lost consciousness). ?Call your doctor now or seek immediate medical care if: 
? · You have new or increased shortness of breath. ? · You feel dizzy or lightheaded, or you feel like you may faint. ? · Your heart rate becomes irregular. ? · You can feel your heart flutter in your chest or skip heartbeats. Tell your doctor if these symptoms are new or worse. ? Watch closely for changes in your health, and be sure to contact your doctor if you have any problems. Where can you learn more? Go to http://john-evaristo.info/. Enter U020 in the search box to learn more about \"Atrial Fibrillation: Care Instructions. \" Current as of: September 21, 2016 Content Version: 11.4 © 9127-2361 [employer]. Care instructions adapted under license by [employer] (which disclaims liability or warranty for this information). If you have questions about a medical condition or this instruction, always ask your healthcare Learning About Coronary Artery Disease (CAD) What is coronary artery disease? Coronary artery disease (CAD) occurs when plaque builds up in the arteries that bring oxygen-rich blood to your heart. Plaque is a fatty substance made of cholesterol, calcium, and other substances in the blood. This process is called hardening of the arteries, or atherosclerosis. What happens when you have coronary artery disease? · Plaque may narrow the coronary arteries.  Narrowed arteries cause poor blood flow. This can lead to angina symptoms such as chest pain or discomfort. If blood flow is completely blocked, you could have a heart attack. · You can slow CAD and reduce the risk of future problems by making changes in your lifestyle. These include quitting smoking and eating heart-healthy foods. · Treatments for CAD, along with changes in your lifestyle, can help you live a longer and healthier life. How can you prevent coronary artery disease? · Do not smoke. It may be the best thing you can do to prevent heart disease. If you need help quitting, talk to your doctor about stop-smoking programs and medicines. These can increase your chances of quitting for good. · Be active. Get at least 30 minutes of exercise on most days of the week. Walking is a good choice. You also may want to do other activities, such as running, swimming, cycling, or playing tennis or team sports. · Eat heart-healthy foods. Eat more fruits and vegetables and less foods that contain saturated and trans fats. Limit alcohol, sodium, and sweets. · Stay at a healthy weight. Lose weight if you need to. · Manage other health problems such as diabetes, high blood pressure, and high cholesterol. · Manage stress. Stress can hurt your heart. To keep stress low, talk about your problems and feelings. Don't keep your feelings hidden. · If you have talked about it with your doctor, take a low-dose aspirin every day. Aspirin can help certain people lower their risk of a heart attack or stroke. But taking aspirin isn't right for everyone, because it can cause serious bleeding. Do not start taking daily aspirin unless your doctor knows about it. How is coronary artery disease treated? · Your doctor will suggest that you make lifestyle changes. For example, your doctor may ask you to eat healthy foods, quit smoking, lose extra weight, and be more active. · You will have to take medicines. · Your doctor may suggest a procedure to open narrowed or blocked arteries. This is called angioplasty. Or your doctor may suggest using healthy blood vessels to create detours around narrowed or blocked arteries. This is called bypass surgery. Follow-up care is a key part of your treatment and safety. Be sure to make and go to all appointments, and call your doctor if you are having problems. It's also a good idea to know your test results and keep a list of the medicines you take. Where can you learn more? Go to http://john-evaristo.info/. Enter [id number] in the search box to learn more about \"Learning About Coronary Artery Disease (CAD). \" Current as of: September 21, 2016 Content Version: 11.4 © 7164-2920 [employer]. Care instructions adapted under license by [employer] (which disclaims liability or warranty for this information). If you have questions about a medical condition or this instruction, always ask your healthcare professional. [place] [address] any warranty or liability for your use of this information. professional. [facility] Services[facility]oker[facility] any warranty or liability for your use of this information. Cardiac Catheterization/Angiography Discharge Instructions *Check the puncture site frequently for swelling or bleeding. If you see any bleeding, lie down and apply pressure over the area with a clean town or washcloth. Notify your doctor for any redness, swelling, drainage or oozing from the puncture site. Notify your doctor for any fever or chills. *If the leg or arm with the puncture becomes cold, numb or painful, go to your nearest emergency room. *Activity should be limited for the next 48 hours. Climb stairs as little as possible and avoid any stooping, bending or strenuous activity for 48 hours. No heavy lifting (anything over 10 pounds) for three days. *Do not drive for 48 hours. *You may resume your usual diet. Drink more fluids than usual. 
 
*Have a responsible person drive you home and stay with you for at least 24 hours after your heart catheterization/angiography. *You may remove the bandage from your Right and Arm in 24 hours. You may shower in 24 hours. No tub baths, hot tubs or swimming for one week. Do not place any lotions, creams, powders, ointments over the puncture site for one week. You may place a clean band-aid over the puncture site each day for 5 days. Change this daily. Sedation for a Medical Procedure: Care Instructions Your Care Instructions For a minor procedure or surgery, you will get a sedative to help you relax. This drug will make you sleepy. It is usually given in a vein (by IV). A shot may also be used to numb the area. If you had local anesthesia, you may feel some pain and discomfort as it wears off. If you have pain, don't be afraid to say so. Pain medicine works better if you take it before the pain gets bad. Common side effects from sedation include: · Feeling sleepy. (Your doctors and nurses will make sure you are not too sleepy to go home.) · Nausea and vomiting. This usually does not last long. · Feeling tired. Follow-up care is a key part of your treatment and safety. Be sure to make and go to all appointments, and call your doctor if you are having problems. It's also a good idea to know your test results and keep a list of the medicines you take. How can you care for yourself at home? Activity ? · Don't do anything for 24 hours that requires attention to detail. It takes time for the medicine effects to completely wear off.  
? · For your safety, you should not drive or operate any machinery that could be dangerous until the medicine wears off and you can think clearly and react easily. ? · Rest when you feel tired. Getting enough sleep will help you recover. Diet ? · You can eat your normal diet, unless your doctor gives you other instructions. If your stomach is upset, try clear liquids and bland, low-fat foods like plain toast or rice. ? · Drink plenty of fluids (unless your doctor tells you not to). ? · Don't drink alcohol for 24 hours. Medicines ? · Be safe with medicines. Read and follow all instructions on the label. ¨ If the doctor gave you a prescription medicine for pain, take it as prescribed. ¨ If you are not taking a prescription pain medicine, ask your doctor if you can take an over-the-counter medicine. ? · If you think your pain medicine is making you sick to your stomach: 
¨ Take your medicine after meals (unless your doctor has told you not to). ¨ Ask your doctor for a different pain medicine. When should you call for help? Call 911 anytime you think you may need emergency care. For example, call if: 
? · You have severe trouble breathing. ? · You passed out (lost consciousness). ?Call your doctor now or seek immediate medical care if: 
? · You have trouble breathing. ? · You have ongoing or worsening nausea or vomiting. ? · You have a fever. ? · You have a new or worse headache. ? · The medicine is not wearing off and you can't think clearly. ? Watch closely for changes in your health, and be sure to contact your doctor if: 
? · You do not get better as expected. Where can you learn more? Go to http://john-evaristo.info/. Enter E520 in the search box to learn more about \"Sedation for a Medical Procedure: Care Instructions. \" Current as of: August 14, 2016 Content Version: 11.4 © 1048-2913 [employer]. Care instructions adapted under license by [employer] (which disclaims liability or warranty for this information).  If you have questions about a medical condition or this instruction, always ask your healthcare professional. [employer] disclaims any warranty or liability for your use of this information. DISCHARGE SUMMARY from Nurse PATIENT INSTRUCTIONS: 
 
 
F-face looks uneven A-arms unable to move or move unevenly S-speech slurred or non-existent T-time-call 911 as soon as signs and symptoms begin-DO NOT go Back to bed or wait to see if you get better-TIME IS BRAIN. Warning Signs of HEART ATTACK Call 911 if you have these symptoms: 
? Chest discomfort. Most heart attacks involve discomfort in the center of the chest that lasts more than a few minutes, or that goes away and comes back. It can feel like uncomfortable pressure, squeezing, fullness, or pain. ? Discomfort in other areas of the upper body. Symptoms can include pain or discomfort in one or both arms, the back, neck, jaw, or stomach. ? Shortness of breath with or without chest discomfort. ? Other signs may include breaking out in a cold sweat, nausea, or lightheadedness. Don't wait more than five minutes to call [address]! Fast action can save your life. Calling 911 is almost always the fastest way to get lifesaving treatment. Emergency Medical Services staff can begin treatment when they arrive  up to an hour sooner than if someone gets to the hospital by car. The discharge information has been reviewed with the patient. The patient verbalized understanding. Discharge medications reviewed with the patient and appropriate educational materials and side effects teaching were provided. ___________________________________________________________________________________________________________________________________ Patient armband removed and shredded. [employer] Announcement We are excited to announce that we are making your provider's discharge notes available to you in [employer]. You will see these notes when they are completed and signed by the physician that discharged you from your recent hospital stay. If you have any questions or concerns about any information you see in [employer], please call the Health Information Department where you were seen or reach out to your Primary Care Provider for more information about your plan of care. Introducing [facility]! [employer] introduces [employer] patient portal. Now you can access parts of your medical record, email your doctor's office, and request medication refills online. 1. In your internet browser, go to [email or web address] 2. Click on the First Time User? Click Here link in the Sign In box. You will see the New Member Sign Up page. 3. Enter your [employer] Access Code exactly as it appears below. You will not need to use this code after youve completed the sign-up process. If you do not sign up before the expiration date, you must request a new code. · [employer] Access Code: [email or web address] Expires: 2/26/2018 11:13 AM 
 
4. Enter the last four digits of your Social Security Number (xxxx) and Date of Birth (mm/dd/yyyy) as indicated and click Submit. You will be taken to the next sign-up page. 5. Create a [employer] ID. This will be your [employer] login ID and cannot be changed, so think of one that is secure and easy to remember. 6. Create a [employer] password. You can change your password at any time. 7. Enter your Password Reset Question and Answer. This can be used at a later time if you forget your password. 8. Enter your e-mail address. You will receive e-mail notification when new information is available in [address] 9. Click Sign Up. You can now view and download portions of your medical record. 10. Click the Download Summary menu link to download a portable copy of your medical information. If you have questions, please visit the Frequently Asked Questions section of the [employer] website. Remember, [employer] is NOT to be used for urgent needs. For medical emergencies, dial 911. Now available from your iPhone and Android! Unresulted Labs-Please follow up with your PCP about these lab tests Order Current Status EKG, 12 LEAD, INITIAL Preliminary result Providers Seen During Your Hospitalization Provider Specialty Primary office phone Yvon Hilario MD Emergency Medicine [phone] Nadege Manley MD Family Practice [phone] Your Primary Care Physician (PCP) Primary Care Physician Office Phone Office Fax Merlene Aguirrein [phone] You are allergic to the following Allergen Reactions Pcn (Penicillins) Rash Recent Documentation Height Weight BMI Smoking Status 1.829 m 101.8 kg 30.43 kg/m2 Never Smoker Emergency Contacts Name Discharge Info Relation Home Work Mobile Malu Flores DISCHARGE CAREGIVER [3] Spouse [3] [phone] Patient Belongings The following personal items are in your possession at time of discharge: 
  Dental Appliances: None  Visual Aid: None      Home Medications: None   Jewelry: Ring (gold colored wedding band)  Clothing: Footwear, Pants, Shirt, Socks, Undergarments    Other Valuables: [employer] Please provide this summary of care documentation to your next provider. Signatures-by signing, you are acknowledging that this After Visit Summary has been reviewed with you and you have received a copy. Patient Signature:  ____________________________________________________________ Date:  ____________________________________________________________  
  
Torres Alberto  Provider Signature: ____________________________________________________________ Date:  ____________________________________________________________

## 2017-11-24 NOTE — ROUTINE PROCESS
TRANSFER - IN REPORT:    Verbal report received from Nish Owen RN(name) on Rob Smith  being received from ED(unit) for routine progression of care      Report consisted of patients Situation, Background, Assessment and   Recommendations(SBAR). Information from the following report(s) SBAR, Kardex, ED Summary, Intake/Output, MAR and Recent Results was reviewed with the receiving nurse. Opportunity for questions and clarification was provided. Assessment completed upon patients arrival to unit and care assumed.

## 2017-11-24 NOTE — ED NOTES
Pt family at bedside, pt resting on stretcher, call bell within reach, side rails up, pt denies chest pain/sob, pt denies hx of a. Fib. Pt has hx of 2 cardiac stents. Pt being admitted pt aware.

## 2017-11-24 NOTE — IP AVS SNAPSHOT
[address] [place] [address] Elsa [address] 
[phone] Patient: Ramiro Shaw MRN: [id number] FMY:0/64/1006 My Medications STOP taking these medications PLAVIX 75 mg Tab Generic drug:  clopidogrel TAKE these medications as instructed Instructions Each Dose to Equal  
 Morning Noon Evening Bedtime  
 aspirin 81 mg chewable tablet Your last dose was:  11/28 Your next dose is:  11/29 Take 1 Tab by mouth daily. 81 mg  
    
  
   
   
   
  
 CRESTOR 20 mg tablet Generic drug:  rosuvastatin Your next dose is:  11/28 WITH DINNER Take 20 mg by mouth nightly. 20 mg  
    
   
   
  
   
  
 dilTIAZem  mg Tb24 tablet Commonly known as:  CARDIZEM LA Your last dose was:  11/28 Your next dose is:  11/29 Take 180 mg by mouth daily. 180 mg  
    
  
   
   
   
  
 furosemide 40 mg tablet Commonly known as:  LASIX Your last dose was:  11/28 Your next dose is:  11/29 Take 1 Tab by mouth daily. 40 mg  
    
  
   
   
   
  
 irbesartan 150 mg tablet Commonly known as:  AVAPRO Your last dose was:  11/28 Your next dose is:  11/29 Take 1 tablet by mouth daily. 150 mg  
    
  
   
   
   
  
 levoFLOXacin 250 mg tablet Commonly known as:  Danne Echevaria Your next dose is:  11/28 IN THE MORNING FOR FOUR DAYS Take 1 Tab by mouth daily for 4 doses. 250 mg  
    
  
   
   
   
  
 metoprolol tartrate 50 mg tablet Commonly known as:  LOPRESSOR Your last dose was:  11/28 MORNING Your next dose is:  11/28 WITH DINNER Take 1 Tab by mouth two (2) times a day. 50 mg  
    
  
   
   
  
   
  
 PriLOSEC 40 mg capsule Generic drug:  omeprazole Your last dose was:  11/28 Your next dose is:  11/29 Take 40 mg by mouth daily. 40 mg  
    
  
   
   
   
  
 rivaroxaban 20 mg Tab tablet Commonly known as:  Radha Mirza Your next dose is:  11/28 TAKE WITH DINNER Take 1 Tab by mouth daily (with dinner). 20 mg  
    
   
   
  
   
  
 spironolactone 25 mg tablet Commonly known as:  ALDACTONE Start taking on:  11/29/2017 Take 1 Tab by mouth daily. 25 mg  
    
  
   
   
   
  
 ZyrTEC 10 mg Cap Generic drug:  Cetirizine Your next dose is:  11/29 Take 10 mg by mouth. 10 mg Where to Get Your Medications These medications were sent to [address] [place] [address] Hours:  24-hours Phone:  [phone]  
  furosemide 40 mg tablet  
 levoFLOXacin 250 mg tablet  
 metoprolol tartrate 50 mg tablet  
 rivaroxaban 20 mg Tab tablet  
 spironolactone 25 mg tablet

## 2017-11-25 LAB
ANION GAP SERPL CALC-SCNC: 9 MMOL/L (ref 3–18)
APTT PPP: 87 SEC (ref 23–36.4)
APTT PPP: 94.6 SEC (ref 23–36.4)
BASOPHILS # BLD: 0.1 K/UL (ref 0–0.06)
BASOPHILS NFR BLD: 1 % (ref 0–2)
BUN SERPL-MCNC: 13 MG/DL (ref 7–18)
BUN/CREAT SERPL: 10 (ref 12–20)
CALCIUM SERPL-MCNC: 8.3 MG/DL (ref 8.5–10.1)
CHLORIDE SERPL-SCNC: 106 MMOL/L (ref 100–108)
CHOLEST SERPL-MCNC: 159 MG/DL
CK MB CFR SERPL CALC: ABNORMAL % (ref 0–4)
CK MB SERPL-MCNC: <1 NG/ML (ref 5–25)
CK SERPL-CCNC: 175 U/L (ref 39–308)
CO2 SERPL-SCNC: 27 MMOL/L (ref 21–32)
CREAT SERPL-MCNC: 1.31 MG/DL (ref 0.6–1.3)
DIFFERENTIAL METHOD BLD: ABNORMAL
EOSINOPHIL # BLD: 0.1 K/UL (ref 0–0.4)
EOSINOPHIL NFR BLD: 1 % (ref 0–5)
ERYTHROCYTE [DISTWIDTH] IN BLOOD BY AUTOMATED COUNT: 13.7 % (ref 11.6–14.5)
GLUCOSE SERPL-MCNC: 113 MG/DL (ref 74–99)
HCT VFR BLD AUTO: 49 % (ref 36–48)
HDLC SERPL-MCNC: 40 MG/DL (ref 40–60)
HDLC SERPL: 4 {RATIO} (ref 0–5)
HGB BLD-MCNC: 16.8 G/DL (ref 13–16)
LDLC SERPL CALC-MCNC: 103.6 MG/DL (ref 0–100)
LIPID PROFILE,FLP: ABNORMAL
LYMPHOCYTES # BLD: 1.3 K/UL (ref 0.9–3.6)
LYMPHOCYTES NFR BLD: 14 % (ref 21–52)
MAGNESIUM SERPL-MCNC: 2 MG/DL (ref 1.6–2.6)
MCH RBC QN AUTO: 30.9 PG (ref 24–34)
MCHC RBC AUTO-ENTMCNC: 34.3 G/DL (ref 31–37)
MCV RBC AUTO: 90.2 FL (ref 74–97)
MONOCYTES # BLD: 1.1 K/UL (ref 0.05–1.2)
MONOCYTES NFR BLD: 12 % (ref 3–10)
NEUTS SEG # BLD: 6.6 K/UL (ref 1.8–8)
NEUTS SEG NFR BLD: 72 % (ref 40–73)
PLATELET # BLD AUTO: 193 K/UL (ref 135–420)
PMV BLD AUTO: 12.1 FL (ref 9.2–11.8)
POTASSIUM SERPL-SCNC: 4 MMOL/L (ref 3.5–5.5)
RBC # BLD AUTO: 5.43 M/UL (ref 4.7–5.5)
SODIUM SERPL-SCNC: 142 MMOL/L (ref 136–145)
TRIGL SERPL-MCNC: 77 MG/DL (ref ?–150)
TROPONIN I SERPL-MCNC: 0.26 NG/ML (ref 0–0.06)
TSH SERPL DL<=0.05 MIU/L-ACNC: 1.62 UIU/ML (ref 0.36–3.74)
VLDLC SERPL CALC-MCNC: 15.4 MG/DL
WBC # BLD AUTO: 9.1 K/UL (ref 4.6–13.2)

## 2017-11-25 PROCEDURE — 83735 ASSAY OF MAGNESIUM: CPT | Performed by: HOSPITALIST

## 2017-11-25 PROCEDURE — 74011250636 HC RX REV CODE- 250/636: Performed by: FAMILY MEDICINE

## 2017-11-25 PROCEDURE — 74011250636 HC RX REV CODE- 250/636: Performed by: HOSPITALIST

## 2017-11-25 PROCEDURE — 74011250636 HC RX REV CODE- 250/636: Performed by: EMERGENCY MEDICINE

## 2017-11-25 PROCEDURE — 74011250637 HC RX REV CODE- 250/637: Performed by: FAMILY MEDICINE

## 2017-11-25 PROCEDURE — 80048 BASIC METABOLIC PNL TOTAL CA: CPT | Performed by: FAMILY MEDICINE

## 2017-11-25 PROCEDURE — 84484 ASSAY OF TROPONIN QUANT: CPT | Performed by: FAMILY MEDICINE

## 2017-11-25 PROCEDURE — 85025 COMPLETE CBC W/AUTO DIFF WBC: CPT | Performed by: FAMILY MEDICINE

## 2017-11-25 PROCEDURE — 65660000000 HC RM CCU STEPDOWN

## 2017-11-25 PROCEDURE — 85730 THROMBOPLASTIN TIME PARTIAL: CPT | Performed by: FAMILY MEDICINE

## 2017-11-25 PROCEDURE — 74011250637 HC RX REV CODE- 250/637: Performed by: HOSPITALIST

## 2017-11-25 PROCEDURE — 36415 COLL VENOUS BLD VENIPUNCTURE: CPT | Performed by: FAMILY MEDICINE

## 2017-11-25 PROCEDURE — 74011250636 HC RX REV CODE- 250/636: Performed by: INTERNAL MEDICINE

## 2017-11-25 PROCEDURE — 84443 ASSAY THYROID STIM HORMONE: CPT | Performed by: FAMILY MEDICINE

## 2017-11-25 PROCEDURE — 80061 LIPID PANEL: CPT | Performed by: FAMILY MEDICINE

## 2017-11-25 PROCEDURE — 74011250637 HC RX REV CODE- 250/637: Performed by: INTERNAL MEDICINE

## 2017-11-25 RX ORDER — ENOXAPARIN SODIUM 100 MG/ML
1 INJECTION SUBCUTANEOUS EVERY 12 HOURS
Status: DISCONTINUED | OUTPATIENT
Start: 2017-11-25 | End: 2017-11-27

## 2017-11-25 RX ORDER — FUROSEMIDE 10 MG/ML
20 INJECTION INTRAMUSCULAR; INTRAVENOUS ONCE
Status: DISCONTINUED | OUTPATIENT
Start: 2017-11-25 | End: 2017-11-25

## 2017-11-25 RX ORDER — ZOLPIDEM TARTRATE 5 MG/1
5 TABLET ORAL
Status: DISCONTINUED | OUTPATIENT
Start: 2017-11-25 | End: 2017-11-28 | Stop reason: HOSPADM

## 2017-11-25 RX ORDER — ASPIRIN 81 MG/1
81 TABLET ORAL DAILY
Status: DISCONTINUED | OUTPATIENT
Start: 2017-11-25 | End: 2017-11-28 | Stop reason: HOSPADM

## 2017-11-25 RX ORDER — FUROSEMIDE 10 MG/ML
40 INJECTION INTRAMUSCULAR; INTRAVENOUS ONCE
Status: COMPLETED | OUTPATIENT
Start: 2017-11-25 | End: 2017-11-25

## 2017-11-25 RX ADMIN — ROSUVASTATIN CALCIUM 20 MG: 10 TABLET, FILM COATED ORAL at 21:22

## 2017-11-25 RX ADMIN — OMEPRAZOLE 40 MG: 20 CAPSULE, DELAYED RELEASE ORAL at 09:38

## 2017-11-25 RX ADMIN — ENOXAPARIN SODIUM 100 MG: 100 INJECTION SUBCUTANEOUS at 12:02

## 2017-11-25 RX ADMIN — ENOXAPARIN SODIUM 100 MG: 100 INJECTION SUBCUTANEOUS at 21:22

## 2017-11-25 RX ADMIN — IRBESARTAN 150 MG: 150 TABLET ORAL at 09:39

## 2017-11-25 RX ADMIN — HEPARIN SODIUM 15 UNITS/KG/HR: 10000 INJECTION, SOLUTION INTRAVENOUS at 02:53

## 2017-11-25 RX ADMIN — ASPIRIN 81 MG: 81 TABLET, COATED ORAL at 12:02

## 2017-11-25 RX ADMIN — ZOLPIDEM TARTRATE 5 MG: 5 TABLET ORAL at 21:22

## 2017-11-25 RX ADMIN — METOPROLOL TARTRATE 25 MG: 25 TABLET ORAL at 21:22

## 2017-11-25 RX ADMIN — Medication 10 ML: at 14:00

## 2017-11-25 RX ADMIN — METOPROLOL TARTRATE 25 MG: 25 TABLET ORAL at 09:39

## 2017-11-25 RX ADMIN — Medication 10 ML: at 21:23

## 2017-11-25 RX ADMIN — FUROSEMIDE 40 MG: 10 INJECTION, SOLUTION INTRAMUSCULAR; INTRAVENOUS at 12:10

## 2017-11-25 RX ADMIN — LEVOFLOXACIN 500 MG: 5 INJECTION, SOLUTION INTRAVENOUS at 16:51

## 2017-11-25 NOTE — PROGRESS NOTES
3001  patient care. No signs or symptoms of distress noted. 7394 Assessment complete. A&Ox4. Telemetry box # 6, A-fib with RVR. Room Air. Cardiac diet. Peripheral IV Left AC 20 gauge, patent/flushed/capped. Left wrist 22 gauge, patent/flushed/capped. Troponin 0.26. Patient states\" he feels dry and wheezing in his lungs. \" Lungs clear, all lobes. Bed in lowest position. Call bell and phone within reach. 1238 Patient reassessed. No signs or symptoms of distress noted. [phone] Patient reassessed. Patient stated Selina Goldman still feels dry\". Observed a nonproductive cough. All lungs fields clear. Patient administered Lasix was administered at 1210. A one time dose. 1823 Patient in bed watching football with family. No signs or symptoms of distress noted.

## 2017-11-25 NOTE — ROUTINE PROCESS
Bedside and Verbal shift change report given to CHIDI Rosenberg (oncoming nurse) by KEENAN Recinos, RN (offgoing nurse). Report included the following information SBAR, Kardex, Intake/Output, MAR and Recent Results.

## 2017-11-25 NOTE — CONSULTS
[address] [place] [address] Rd    Name:  Lisa River  MR#:  [id number]  :  1950  Account #:  [de-identified]  Date of Adm:  2017  Date of Consultation:  2017      REASON FOR CONSULTATION: Shortness of breath, elevated  troponin and new onset atrial fibrillation. HISTORY OF PRESENT ILLNESS: The patient is a 57-year-old  gentleman who has history of 2-vessel coronary artery disease status  post stenting in the past in , came to the hospital with worsening  of shortness of breath from the last couple of days, more than 3 days,  and feeling of chest congestion. Denied any classical anginal  symptoms. His other history is significant for diastolic heart failure,  hyperlipidemia, gastroesophageal reflux disease, and never had these  kind of symptoms before. No history of stroke or bleeding in the past  and no history of systolic heart failure as well. On arrival to the  hospital, the patient was found to have atrial fibrillation and mildly  elevated troponin. In view of multiple risk factors, the patient was  started on heparin and aspirin, admitted in the hospital. Echo is done  and first troponin is abnormal. Denied any fever, cough, or pleuritic  chest pain. Denied any trauma to the chest also. PAST MEDICAL HISTORY  1. CAD status post angioplasty and stenting to the circumflex and to  the right coronary artery. 2. Diastolic heart failure. 3. Urinary tract infection. 4. Hypertension. 5. Hyperlipidemia. SURGICAL HISTORY: Reviewed and discussed with the patient. FAMILY HISTORY: Negative. SOCIAL HISTORY: Denied any active history of smoking or drug  abuse. HOME MEDICATIONS  Reviewed and discussed, include:  1. Diltiazem 180 mg daily. 2. Plavix 75 mg daily. 3. Aspirin 81 mg daily. 4. Crestor 20 mg daily. 5. Lasix 40 mg daily. 6. Irbesartan or Avapro 150 mg daily. REVIEW OF SYSTEMS: A 10-point review of systems completed.   Positive pertinent findings discussed in the history of present illness. The rest of the systems are negative. PHYSICAL EXAMINATION  GENERAL: At the time of the consultation, the patient is comfortable,  not in any distress, not using any accessory muscles of respiration. VITAL SIGNS: Temperature is 98.2, heart rate is 109 in AFib,  respiration rate is 19, blood pressure is 148/98, O2 saturation is 96%. Weight is 99.8 kilograms. HEENT: Head is atraumatic, normocephalic. NECK: Supple. No JVD, no carotid bruits. HEART: S1, S2 audible. LUNGS: Bilateral air entry positive, no added sound. ABDOMEN: Soft, nontender. Bowel sounds audible. EXTREMITIES: No edema. Pulses are palpable. NEUROLOGIC: No focal motor or sensory deficit. DERMATOLOGICAL: No skin rash. MUSCULOSKELETAL: No obvious joint deformity. LABORATORY DATA: EKG with atrial fibrillation with a slightly fast  heart rate. Hemoglobin is 17.4, platelet count is 262. Creatinine is 1.24,  sodium 139, potassium 3.4, magnesium is 1.8. First troponin is 0.38,  CK-MB 1.7. ASSESSMENT AND PLAN  1. Acute-on-chronic diastolic heart failure, possible systolic heart  failure, as well. 2. New-onset of atrial fibrillation. 3. Coronary artery disease. 4. Cardiomyopathy. 5. Hypertension. 6. Dyslipidemia. RECOMMENDATION: I will stop the Cardizem, switch to metoprolol. Continue with aspirin, heparin, and will check the serial cardiac panel  and if the patient have worsening of troponin and have worsening of  ejection fraction, then we will consider possible right and left heart  catheterization. Otherwise, we will continue with rate control, AFib  medication. The patient's CHADS vascular score is more than 2, he  will need long-term anticoagulation. Discussed with the patient and  family.  I will follow the patient in the hospital.        Daisha Chester MD MA / GARY  D:  11/24/2017   17:52  T:  11/24/2017   18:17  Job #:  [id number]

## 2017-11-25 NOTE — PROGRESS NOTES
Problem: Falls - Risk of  Goal: *Absence of Falls  Document Negin Fall Risk and appropriate interventions in the flowsheet.    Outcome: Progressing Towards Goal  Fall Risk Interventions:            Medication Interventions: Evaluate medications/consider consulting pharmacy, Patient to call before getting OOB, Teach patient to arise slowly, Bed/chair exit alarm

## 2017-11-25 NOTE — PROGRESS NOTES
1945:  Received patient from L. 1014 Lulu Garcia RN. Shift Summary:  Patient spent uneventful shift. No issues noted. See flow sheet and MAR.

## 2017-11-25 NOTE — PROGRESS NOTES
Cardiology Progress Note        Patient: Jaquan Perez        Sex: male          DOA: 11/24/2017  YOB: 1950      Age:  79 y.o.        LOS:  LOS: 1 day   Assessment/Plan     Principal Problem:    Dyspnea (11/24/2017)    Active Problems:    UTI (urinary tract infection) (5/6/2013)      Essential hypertension, benign (5/6/2013)      Cardiomyopathy ([facility] (1/27/2015)      CAD (coronary artery disease) (12/3/2015)      [facility] (64/13/2556)      Diastolic CHF, acute on chronic ([facility] (11/24/2017)      Elevated troponin (11/24/2017)        Plan:  CHF  Cardiomyopathy  Afib  Non sustained VT  Keep potasium> 4.0, magnesium> 2  DC heparin and change to Lovenox  Will do LHC/RHC on Monday, discussed with pt, wife, and Dr. Janis Strauss. Subjective:    cc:        REVIEW OF SYSTEMS: NO CP, SOB, N,V,D, F,C  Objective:      Visit Vitals    BP (!) 142/95 (BP 1 Location: Left arm, BP Patient Position: At rest)    Pulse 83    Temp 98.6 °F (37 °C)    Resp 18    Ht 6' (1.829 m)    Wt 102.1 kg (225 lb)    SpO2 97%    BMI 30.52 kg/m2     Body mass index is 30.52 kg/(m^2). Physical Exam:  General Appearance: Comfortable, not using accessory muscles of respiration. HEENT: SUDHAKAR. HEAD: Atraumatic  NECK: No JVD,  LUNGS: Clear bilaterally. HEART: S1 varibale+S2 audible,      ABD: Non-tender, BS Audible    EXT: No edema, and no cysnosis. VASCULAR EXAM: Pulses are intact. PSYCHIATRIC EXAM: Mood is appropriate.     Medication:  Current Facility-Administered Medications   Medication Dose Route Frequency    enoxaparin (LOVENOX) injection 100 mg  1 mg/kg SubCUTAneous Q12H    aspirin delayed-release tablet 81 mg  81 mg Oral DAILY    omeprazole (PRILOSEC) capsule 40 mg  40 mg Oral DAILY    rosuvastatin (CRESTOR) tablet 20 mg  20 mg Oral QHS    irbesartan (AVAPRO) tablet 150 mg  150 mg Oral DAILY    sodium chloride (NS) flush 5-10 mL  5-10 mL IntraVENous Q8H  sodium chloride (NS) flush 5-10 mL  5-10 mL IntraVENous PRN    levoFLOXacin (LEVAQUIN) 500 mg in D5W IVPB  500 mg IntraVENous Q24H    metoprolol tartrate (LOPRESSOR) tablet 25 mg  25 mg Oral Q12H               Lab/Data Reviewed: All lab results for the last 24 hours reviewed.      Recent Labs      11/25/17 0125 11/24/17   0850   WBC  9.1  8.7   HGB  16.8*  17.4*   HCT  49.0*  49.4*   PLT  193  186     Recent Labs      11/25/17 0125 11/24/17   0850   NA  142  139   K  4.0  3.4*   CL  106  108   CO2  27  24   GLU  113*  118*   BUN  13  14   CREA  1.31*  1.24   CA  8.3*  8.7       Signed By: Corinne Pinzon MD     November 25, 2017

## 2017-11-25 NOTE — ROUTINE PROCESS
Bedside and Verbal shift change report given to KEENAN Gibbs (oncoming nurse) by KEENAN Vega (offgoing nurse). Report included the following information SBAR, ED Summary, Intake/Output and MAR.

## 2017-11-25 NOTE — PROGRESS NOTES
Hospitalist Progress Note    Patient: Ramandeep Vargas MRN: [id number]  CSN: [id number]    YOB: 1950  Age: 79 y.o.   Sex: male    DOA: 11/24/2017 LOS:  LOS: 1 day          Chief Complaint:    New afib with RVR      Assessment/Plan     New onset afib with RVR  Acute on chronic mixed systolic and diastolic CHF  Newly reduced EF on echo to 40%-will require ischemic eval-cardiac cath on Monday  CAD  NSTVtach  UTI  HTN    Changed to BID lovenox in anticipation of cath and then [facility] decision after that for PO meds  Cath Monday 11/27  Lasix diuresis-one more dose IV  Asa daily  Statin  Beta blocker  Continue levaquin for UTI  Daily BMP  Check mag today as well for lyte levels  Cardiac monitoring        Disposition :2-3 days inpatient  Patient Active Problem List   Diagnosis Code    Near syncope R51    UTI (urinary tract infection) N39.0    Essential hypertension, benign I10    Esophageal reflux K21.9    Calculus of kidney N20.0    CHF (congestive heart failure) (HCC) I50.9    CHF exacerbation ([facility]) I50.9    Cardiomyopathy ([facility] I42.9    GERD (gastroesophageal reflux disease) K21.9    NSTEMI (non-ST elevated myocardial infarction) [facility] I21.4    CAD (coronary artery disease) K15.94    Diastolic CHF, chronic (HCC) I50.32    Atrial fibrillation ([facility]) I48.91    Dyspnea O59.74    Diastolic CHF, acute on chronic ([facility]) I50.33    Elevated troponin R74.8       Subjective:  A little SOB when up still  Was SOB last night in bed, better today  Denies CP      Review of systems:    Constitutional: denies fevers, chills, myalgias  Respiratory: denies cough  Cardiovascular: denies chest pain, palpitations  Gastrointestinal: denies nausea, vomiting, diarrhea      Vital signs/Intake and Output:  Visit Vitals    /80    Pulse 81    Temp 98.4 °F (36.9 °C)    Resp 18    Ht 6' (1.829 m)    Wt 102.1 kg (225 lb)    SpO2 96%    BMI 30.52 kg/m2     Current Shift:     Last three shifts:  11/23 1901 - 11/25 0700  In: 0   Out: 1440 [Urine:1440]    Exam:    General: Well developed, alert, NAD, OX3  Head/Neck: NCAT, supple, No masses, No lymphadenopathy  CVS:reg rate, irreg rhythm  Lungs:few crackles bases  Abdomen: Soft, Nontender, No distention, Normal Bowel sounds, No hepatomegaly  Extremities: No C/C/E, pulses palpable 2+  Skin:normal texture and turgor, no rashes, no lesions  Neuro:grossly normal , follows commands  Psych:appropriate                Labs: Results:       Chemistry Recent Labs      11/25/17 0125 11/24/17   0850   GLU  113*  118*   NA  142  139   K  4.0  3.4*   CL  106  108   CO2  27  24   BUN  13  14   CREA  1.31*  1.24   CA  8.3*  8.7   AGAP  9  7   BUCR  10*  11*   AP   --   70   TP   --   6.8   ALB   --   3.5   GLOB   --   3.3   AGRAT   --   1.1      CBC w/Diff Recent Labs      11/25/17 0125 11/24/17   0850   WBC  9.1  8.7   RBC  5.43  5.59*   HGB  16.8*  17.4*   HCT  49.0*  49.4*   PLT  193  186   GRANS  72  76*   LYMPH  14*  11*   EOS  1  3      Cardiac Enzymes Recent Labs      11/25/17 0125 11/24/17   2145   CPK  175  182   CKND1  CALCULATION NOT PERFORMED WHEN RESULT IS BELOW LINEAR LIMIT  CALCULATION NOT PERFORMED WHEN RESULT IS BELOW LINEAR LIMIT      Coagulation Recent Labs      11/25/17   0730  11/25/17 0125 11/24/17   0850   PTP   --    --    --   13.0   INR   --    --    --   1.0   APTT  94.6*  87.0*   < >   --     < > = values in this interval not displayed. Lipid Panel Lab Results   Component Value Date/Time    Cholesterol, total 159 11/25/2017 01:25 AM    HDL Cholesterol 40 11/25/2017 01:25 AM    LDL, calculated 103.6 11/25/2017 01:25 AM    VLDL, calculated 15.4 11/25/2017 01:25 AM    Triglyceride 77 11/25/2017 01:25 AM    CHOL/HDL Ratio 4.0 11/25/2017 01:25 AM      BNP No results for input(s): BNPP in the last 72 hours.    Liver Enzymes Recent Labs      11/24/17   0850   TP  6.8   ALB  3.5   AP  70   SGOT  28      Thyroid Studies Lab Results   Component Value Date/Time    TSH 1.19 05/07/2013 02:15 PM        Procedures/imaging: see electronic medical records for all procedures/Xrays and details which were not copied into this note but were reviewed prior to creation of Jaden Bolanos MD

## 2017-11-26 LAB
ANION GAP SERPL CALC-SCNC: 7 MMOL/L (ref 3–18)
BACTERIA SPEC CULT: ABNORMAL
BASOPHILS # BLD: 0.1 K/UL (ref 0–0.06)
BASOPHILS NFR BLD: 1 % (ref 0–2)
BUN SERPL-MCNC: 13 MG/DL (ref 7–18)
BUN/CREAT SERPL: 10 (ref 12–20)
CALCIUM SERPL-MCNC: 8.5 MG/DL (ref 8.5–10.1)
CHLORIDE SERPL-SCNC: 110 MMOL/L (ref 100–108)
CO2 SERPL-SCNC: 28 MMOL/L (ref 21–32)
CREAT SERPL-MCNC: 1.35 MG/DL (ref 0.6–1.3)
DIFFERENTIAL METHOD BLD: ABNORMAL
EOSINOPHIL # BLD: 0.2 K/UL (ref 0–0.4)
EOSINOPHIL NFR BLD: 2 % (ref 0–5)
ERYTHROCYTE [DISTWIDTH] IN BLOOD BY AUTOMATED COUNT: 13.5 % (ref 11.6–14.5)
GLUCOSE SERPL-MCNC: 101 MG/DL (ref 74–99)
HCT VFR BLD AUTO: 47.3 % (ref 36–48)
HGB BLD-MCNC: 16.3 G/DL (ref 13–16)
LYMPHOCYTES # BLD: 1.4 K/UL (ref 0.9–3.6)
LYMPHOCYTES NFR BLD: 13 % (ref 21–52)
MCH RBC QN AUTO: 31 PG (ref 24–34)
MCHC RBC AUTO-ENTMCNC: 34.5 G/DL (ref 31–37)
MCV RBC AUTO: 90.1 FL (ref 74–97)
MONOCYTES # BLD: 1.3 K/UL (ref 0.05–1.2)
MONOCYTES NFR BLD: 13 % (ref 3–10)
NEUTS SEG # BLD: 7.4 K/UL (ref 1.8–8)
NEUTS SEG NFR BLD: 71 % (ref 40–73)
PLATELET # BLD AUTO: 172 K/UL (ref 135–420)
PMV BLD AUTO: 11.7 FL (ref 9.2–11.8)
POTASSIUM SERPL-SCNC: 3.5 MMOL/L (ref 3.5–5.5)
RBC # BLD AUTO: 5.25 M/UL (ref 4.7–5.5)
SERVICE CMNT-IMP: ABNORMAL
SODIUM SERPL-SCNC: 145 MMOL/L (ref 136–145)
WBC # BLD AUTO: 10.3 K/UL (ref 4.6–13.2)

## 2017-11-26 PROCEDURE — 74011250637 HC RX REV CODE- 250/637: Performed by: HOSPITALIST

## 2017-11-26 PROCEDURE — 36415 COLL VENOUS BLD VENIPUNCTURE: CPT | Performed by: FAMILY MEDICINE

## 2017-11-26 PROCEDURE — 65660000000 HC RM CCU STEPDOWN

## 2017-11-26 PROCEDURE — 74011250637 HC RX REV CODE- 250/637: Performed by: FAMILY MEDICINE

## 2017-11-26 PROCEDURE — 85025 COMPLETE CBC W/AUTO DIFF WBC: CPT | Performed by: FAMILY MEDICINE

## 2017-11-26 PROCEDURE — 80048 BASIC METABOLIC PNL TOTAL CA: CPT | Performed by: FAMILY MEDICINE

## 2017-11-26 PROCEDURE — 74011250636 HC RX REV CODE- 250/636: Performed by: FAMILY MEDICINE

## 2017-11-26 PROCEDURE — 74011250637 HC RX REV CODE- 250/637: Performed by: INTERNAL MEDICINE

## 2017-11-26 PROCEDURE — 74011250636 HC RX REV CODE- 250/636: Performed by: INTERNAL MEDICINE

## 2017-11-26 RX ORDER — METOPROLOL TARTRATE 50 MG/1
50 TABLET ORAL EVERY 12 HOURS
Status: DISCONTINUED | OUTPATIENT
Start: 2017-11-26 | End: 2017-11-28 | Stop reason: HOSPADM

## 2017-11-26 RX ORDER — POTASSIUM CHLORIDE 20 MEQ/1
40 TABLET, EXTENDED RELEASE ORAL ONCE
Status: COMPLETED | OUTPATIENT
Start: 2017-11-26 | End: 2017-11-26

## 2017-11-26 RX ORDER — FUROSEMIDE 40 MG/1
40 TABLET ORAL DAILY
Status: DISCONTINUED | OUTPATIENT
Start: 2017-11-26 | End: 2017-11-28 | Stop reason: HOSPADM

## 2017-11-26 RX ADMIN — LEVOFLOXACIN 500 MG: 5 INJECTION, SOLUTION INTRAVENOUS at 15:15

## 2017-11-26 RX ADMIN — ZOLPIDEM TARTRATE 5 MG: 5 TABLET ORAL at 21:28

## 2017-11-26 RX ADMIN — FUROSEMIDE 40 MG: 40 TABLET ORAL at 11:11

## 2017-11-26 RX ADMIN — Medication 10 ML: at 15:21

## 2017-11-26 RX ADMIN — OMEPRAZOLE 40 MG: 20 CAPSULE, DELAYED RELEASE ORAL at 08:39

## 2017-11-26 RX ADMIN — POTASSIUM CHLORIDE 40 MEQ: 20 TABLET, EXTENDED RELEASE ORAL at 15:14

## 2017-11-26 RX ADMIN — ENOXAPARIN SODIUM 100 MG: 100 INJECTION SUBCUTANEOUS at 11:11

## 2017-11-26 RX ADMIN — IRBESARTAN 150 MG: 150 TABLET ORAL at 08:39

## 2017-11-26 RX ADMIN — ROSUVASTATIN CALCIUM 20 MG: 10 TABLET, FILM COATED ORAL at 21:28

## 2017-11-26 RX ADMIN — Medication 10 ML: at 08:42

## 2017-11-26 RX ADMIN — METOPROLOL TARTRATE 25 MG: 25 TABLET ORAL at 08:39

## 2017-11-26 RX ADMIN — ENOXAPARIN SODIUM 100 MG: 100 INJECTION SUBCUTANEOUS at 21:28

## 2017-11-26 RX ADMIN — ASPIRIN 81 MG: 81 TABLET, COATED ORAL at 08:40

## 2017-11-26 RX ADMIN — METOPROLOL TARTRATE 50 MG: 50 TABLET ORAL at 21:28

## 2017-11-26 NOTE — PROGRESS NOTES
Problem: Falls - Risk of  Goal: *Absence of Falls  Document Negin Fall Risk and appropriate interventions in the flowsheet.    Outcome: Progressing Towards Goal  Fall Risk Interventions:            Medication Interventions: Assess postural VS orthostatic hypotension, Patient to call before getting OOB, Teach patient to arise slowly

## 2017-11-26 NOTE — PROGRESS NOTES
Problem: Falls - Risk of  Goal: *Absence of Falls  Document Negin Fall Risk and appropriate interventions in the flowsheet.    Outcome: Progressing Towards Goal  Fall Risk Interventions:            Medication Interventions: Teach patient to arise slowly, Patient to call before getting OOB

## 2017-11-26 NOTE — PROGRESS NOTES
0730 Assumed patient care. No signs or symptoms of distress noted. 7717 Assessment complete. A&Ox4. Up at shila. Telemetry box #6, A-fib with short runs of RVR. Peripheral IV LAC, 20 gauge. L wrist 22 gauge. Bed in lowest position. Call bell and phone within reach. 1110 Reassessment. No change from base line assessment. No signs or symptoms of distress noted. -Per Dr. Mohan Mckeon on 11/25, patient's K was to remain >4. Informed Dr. Shai Matute of patient's K level of 3.5. Received verbal phone order with read back for KCL(K-DUR) 40mEq PO one time dose. Placed order in the system and administered K to patient.  -Per verbal order Dr. Shai Matute d/c the morning (1000) dose of Lovanox 100 mg for 11/27/2017.  -Per verbal order Dr. Shai Matute is allowing patient to eat 0600 am breakfast. D/T procedure taking place around 1130am.  1510 Patient reassessed. No change from base line assessment. No signs or symptoms of distress noted.

## 2017-11-26 NOTE — ROUTINE PROCESS
Bedside shift change report given to [address] [place] [address] Street (oncoming nurse) by Heidi Alegria RN (offgoing nurse). Report included the following information SBAR, Kardex, Intake/Output and MAR. Alarm parameters reviewed and opportunities for questions provided.

## 2017-11-26 NOTE — PROGRESS NOTES
20 [facility] care from [address]. Pt is resting in bed with no complaints at this time. 2130- night time medications given as well as PRN Ambien    0300- Pt is sleeping, no complaints at this time     Shift summary- No acute events overnight, stated that the ambien helped very much with sleep. Bedside shift change report given to [address] (oncoming nurse) by Ann Marie Bazan RN (offgoing nurse). Report included the following information SBAR and Kardex.

## 2017-11-26 NOTE — PROGRESS NOTES
Hospitalist Progress Note    Patient: William Graham MRN: [id number]  CSN: [id number]    YOB: 1950  Age: 79 y.o.   Sex: male    DOA: 11/24/2017 LOS:  LOS: 2 days          Chief Complaint:  afib with RVR, systolic CHF        Assessment/Plan   New onset afib with RVR  Acute on chronic mixed systolic and diastolic CHF  Newly reduced EF on echo to 40%-will require ischemic eval-cardiac cath on Monday  CAD  NSTVtach  UTI  HTN     Changed to BID lovenox in anticipation of cath and then [facility] decision after that for PO meds  Cath Monday 11/27  Lasix diuresis-add daily lasix dose  Inc beta blocker dose as HR still elevating when OOB  Asa daily  Statin    Continue levaquin for UTI-e coli on culture  Daily BMP    Cardiac monitoring    Disposition :TBD  Patient Active Problem List   Diagnosis Code    Near syncope R51    UTI (urinary tract infection) N39.0    Essential hypertension, benign I10    Esophageal reflux K21.9    Calculus of kidney N20.0    CHF (congestive heart failure) ([facility] I50.9    CHF exacerbation (HCC) I50.9    Cardiomyopathy ([facility] I42.9    GERD (gastroesophageal reflux disease) K21.9    NSTEMI (non-ST elevated myocardial infarction) ([facility] I21.4    CAD (coronary artery disease) T85.65    Diastolic CHF, chronic (HCC) I50.32    Atrial fibrillation (HCC) I48.91    Dyspnea I93.16    Diastolic CHF, acute on chronic (HCC) I50.33    Elevated troponin R74.8       Subjective:    Denies CP, SOB  HR was elevated when he was in toilet but he did not have palp or CP    Review of systems:    Constitutional: denies fevers, chills, myalgias  Respiratory: denies SOB, cough  Cardiovascular: denies chest pain, palpitations  Gastrointestinal: denies nausea, vomiting, diarrhea      Vital signs/Intake and Output:  Visit Vitals    /64 (BP 1 Location: Left arm, BP Patient Position: At rest)    Pulse (!) 101    Temp 98.3 °F (36.8 °C)    Resp 18    Ht 6' (1.829 m)    Wt 100.7 kg (222 lb 0.1 oz)    SpO2 96%    BMI 30.11 kg/m2     Current Shift:  11/26 0701 - 11/26 1900  In: 240 [P.O.:240]  Out: -   Last three shifts:  11/24 1901 - 11/26 0700  In: 600 [P.O.:600]  Out: 502 [Urine:502]    Exam:    General: Well developed, alert, NAD, OX3  Head/Neck: NCAT, supple, No masses, No lymphadenopathy  CVS:irreg rhythm reg rate no M/R/G, S1/S2 heard, no thrill  Lungs:Clear to auscultation bilaterally, no wheezes, rhonchi, or rales  Abdomen: Soft, Nontender, No distention, Normal Bowel sounds, No hepatomegaly  Extremities: No C/C/E, pulses palpable 2+  Skin:normal texture and turgor, no rashes, no lesions  Neuro:grossly normal , follows commands  Psych:appropriate                Labs: Results:       Chemistry Recent Labs      11/26/17 0352 11/25/17 0125 11/24/17   0850   GLU  101*  113*  118*   NA  145  142  139   K  3.5  4.0  3.4*   CL  110*  106  108   CO2  28  27  24   BUN  13  13  14   CREA  1.35*  1.31*  1.24   CA  8.5  8.3*  8.7   AGAP  7  9  7   BUCR  10*  10*  11*   AP   --    --   70   TP   --    --   6.8   ALB   --    --   3.5   GLOB   --    --   3.3   AGRAT   --    --   1.1      CBC w/Diff Recent Labs      11/26/17 0352 11/25/17 0125 11/24/17   0850   WBC  10.3  9.1  8.7   RBC  5.25  5.43  5.59*   HGB  16.3*  16.8*  17.4*   HCT  47.3  49.0*  49.4*   PLT  172  193  186   GRANS  71  72  76*   LYMPH  13*  14*  11*   EOS  2  1  3      Cardiac Enzymes Recent Labs      11/25/17 0125 11/24/17   2145   CPK  175  182   CKND1  CALCULATION NOT PERFORMED WHEN RESULT IS BELOW LINEAR LIMIT  CALCULATION NOT PERFORMED WHEN RESULT IS BELOW LINEAR LIMIT      Coagulation Recent Labs      11/25/17   0730  11/25/17 0125 11/24/17   0850   PTP   --    --    --   13.0   INR   --    --    --   1.0   APTT  94.6*  87.0*   < >   --     < > = values in this interval not displayed.        Lipid Panel Lab Results   Component Value Date/Time    Cholesterol, total 159 11/25/2017 01:25 AM    HDL Cholesterol 40 11/25/2017 01:25 AM    LDL, calculated 103.6 11/25/2017 01:25 AM    VLDL, calculated 15.4 11/25/2017 01:25 AM    Triglyceride 77 11/25/2017 01:25 AM    CHOL/HDL Ratio 4.0 11/25/2017 01:25 AM      BNP No results for input(s): BNPP in the last 72 hours.    Liver Enzymes Recent Labs      11/24/17   0850   TP  6.8   ALB  3.5   AP  70   SGOT  28      Thyroid Studies Lab Results   Component Value Date/Time    TSH 1.62 11/25/2017 01:25 AM        Procedures/imaging: see electronic medical records for all procedures/Xrays and details which were not copied into this note but were reviewed prior to creation of Mynor Pollack MD

## 2017-11-26 NOTE — PROGRESS NOTES
Cardiology Progress Note        Patient: Pinky Lugo        Sex: male          DOA: 11/24/2017  YOB: 1950      Age:  79 y.o.        LOS:  LOS: 2 days   Assessment/Plan     Principal Problem:    Dyspnea (11/24/2017)    Active Problems:    UTI (urinary tract infection) (5/6/2013)      Essential hypertension, benign (5/6/2013)      Cardiomyopathy ([facility] (1/27/2015)      CAD (coronary artery disease) (12/3/2015)      Atrial fibrillation ([facility] (49/57/0781)      Diastolic CHF, acute on chronic ([facility] (11/24/2017)      Elevated troponin (11/24/2017)        Plan:  CHF  Recurrent non sustained VT  Cardiomyopathy  Afib    Plan LHC/RHC tomorrow  Will start aldactone after cath  If no ischemic CAD then may consider rate control afib RX with anticoagulation otherwise possible DCCV/KEEGAN in future  Discussed with pt                Subjective:    cc:  feeling      REVIEW OF SYSTEMS: NO CP, SOB, N,V,D, F,C  Objective:      Visit Vitals    /64 (BP 1 Location: Left arm, BP Patient Position: At rest)    Pulse (!) 101    Temp 98.3 °F (36.8 °C)    Resp 18    Ht 6' (1.829 m)    Wt 100.7 kg (222 lb 0.1 oz)    SpO2 96%    BMI 30.11 kg/m2     Body mass index is 30.11 kg/(m^2). Physical Exam:  General Appearance: Comfortable, not using accessory muscles of respiration. HEENT: SUDHAKAR. HEAD: Atraumatic  NECK: No JVD, no thyroidomeglay. LUNGS: Clear bilaterally. HEART: S1 variable +S2 audible,     ABD: Non-tender, BS Audible    EXT: No edema, and no cysnosis. VASCULAR EXAM: Pulses are intact. PSYCHIATRIC EXAM: Mood is appropriate.     Medication:  Current Facility-Administered Medications   Medication Dose Route Frequency    furosemide (LASIX) tablet 40 mg  40 mg Oral DAILY    metoprolol tartrate (LOPRESSOR) tablet 50 mg  50 mg Oral Q12H    potassium chloride (K-DUR, KLOR-CON) SR tablet 40 mEq  40 mEq Oral ONCE    enoxaparin (LOVENOX) injection 100 mg  1 mg/kg SubCUTAneous Q12H    aspirin delayed-release tablet 81 mg  81 mg Oral DAILY    zolpidem (AMBIEN) tablet 5 mg  5 mg Oral QHS PRN    omeprazole (PRILOSEC) capsule 40 mg  40 mg Oral DAILY    rosuvastatin (CRESTOR) tablet 20 mg  20 mg Oral QHS    irbesartan (AVAPRO) tablet 150 mg  150 mg Oral DAILY    sodium chloride (NS) flush 5-10 mL  5-10 mL IntraVENous Q8H    sodium chloride (NS) flush 5-10 mL  5-10 mL IntraVENous PRN    levoFLOXacin (LEVAQUIN) 500 mg in D5W IVPB  500 mg IntraVENous Q24H               Lab/Data Reviewed: All lab results for the last 24 hours reviewed.      Recent Labs      11/26/17 0352 11/25/17 0125 11/24/17   0850   WBC  10.3  9.1  8.7   HGB  16.3*  16.8*  17.4*   HCT  47.3  49.0*  49.4*   PLT  172  193  186     Recent Labs      11/26/17 0352 11/25/17 0125  11/24/17   0850   NA  145  142  139   K  3.5  4.0  3.4*   CL  110*  106  108   CO2  28  27  24   GLU  101*  113*  118*   BUN  13  13  14   CREA  1.35*  1.31*  1.24   CA  8.5  8.3*  8.7       Signed By: Chantelle Bhakta MD     November 26, 2017

## 2017-11-27 LAB
ANION GAP SERPL CALC-SCNC: 11 MMOL/L (ref 3–18)
BASOPHILS # BLD: 0.1 K/UL (ref 0–0.06)
BASOPHILS NFR BLD: 1 % (ref 0–2)
BUN SERPL-MCNC: 15 MG/DL (ref 7–18)
BUN/CREAT SERPL: 11 (ref 12–20)
CALCIUM SERPL-MCNC: 8.7 MG/DL (ref 8.5–10.1)
CHLORIDE SERPL-SCNC: 107 MMOL/L (ref 100–108)
CO2 SERPL-SCNC: 26 MMOL/L (ref 21–32)
CREAT SERPL-MCNC: 1.38 MG/DL (ref 0.6–1.3)
DIFFERENTIAL METHOD BLD: ABNORMAL
EOSINOPHIL # BLD: 0.2 K/UL (ref 0–0.4)
EOSINOPHIL NFR BLD: 2 % (ref 0–5)
ERYTHROCYTE [DISTWIDTH] IN BLOOD BY AUTOMATED COUNT: 13.4 % (ref 11.6–14.5)
GLUCOSE SERPL-MCNC: 99 MG/DL (ref 74–99)
HCT VFR BLD AUTO: 48.6 % (ref 36–48)
HGB BLD-MCNC: 16.7 G/DL (ref 13–16)
LYMPHOCYTES # BLD: 1.5 K/UL (ref 0.9–3.6)
LYMPHOCYTES NFR BLD: 19 % (ref 21–52)
MCH RBC QN AUTO: 31.2 PG (ref 24–34)
MCHC RBC AUTO-ENTMCNC: 34.4 G/DL (ref 31–37)
MCV RBC AUTO: 90.7 FL (ref 74–97)
MONOCYTES # BLD: 1 K/UL (ref 0.05–1.2)
MONOCYTES NFR BLD: 13 % (ref 3–10)
NEUTS SEG # BLD: 5.3 K/UL (ref 1.8–8)
NEUTS SEG NFR BLD: 65 % (ref 40–73)
PLATELET # BLD AUTO: 164 K/UL (ref 135–420)
PMV BLD AUTO: 11.8 FL (ref 9.2–11.8)
POTASSIUM SERPL-SCNC: 3.5 MMOL/L (ref 3.5–5.5)
RBC # BLD AUTO: 5.36 M/UL (ref 4.7–5.5)
SODIUM SERPL-SCNC: 144 MMOL/L (ref 136–145)
WBC # BLD AUTO: 8 K/UL (ref 4.6–13.2)

## 2017-11-27 PROCEDURE — 4A023N8 MEASUREMENT OF CARDIAC SAMPLING AND PRESSURE, BILATERAL, PERCUTANEOUS APPROACH: ICD-10-PCS | Performed by: INTERNAL MEDICINE

## 2017-11-27 PROCEDURE — 36415 COLL VENOUS BLD VENIPUNCTURE: CPT | Performed by: FAMILY MEDICINE

## 2017-11-27 PROCEDURE — 74011250636 HC RX REV CODE- 250/636: Performed by: INTERNAL MEDICINE

## 2017-11-27 PROCEDURE — 65660000000 HC RM CCU STEPDOWN

## 2017-11-27 PROCEDURE — 85025 COMPLETE CBC W/AUTO DIFF WBC: CPT | Performed by: FAMILY MEDICINE

## 2017-11-27 PROCEDURE — 74011250636 HC RX REV CODE- 250/636

## 2017-11-27 PROCEDURE — 80048 BASIC METABOLIC PNL TOTAL CA: CPT | Performed by: FAMILY MEDICINE

## 2017-11-27 PROCEDURE — 74011250637 HC RX REV CODE- 250/637: Performed by: HOSPITALIST

## 2017-11-27 PROCEDURE — 74011636320 HC RX REV CODE- 636/320: Performed by: INTERNAL MEDICINE

## 2017-11-27 PROCEDURE — 74011250637 HC RX REV CODE- 250/637: Performed by: FAMILY MEDICINE

## 2017-11-27 PROCEDURE — B2101ZZ FLUOROSCOPY OF SINGLE CORONARY ARTERY USING LOW OSMOLAR CONTRAST: ICD-10-PCS | Performed by: INTERNAL MEDICINE

## 2017-11-27 PROCEDURE — 74011250636 HC RX REV CODE- 250/636: Performed by: FAMILY MEDICINE

## 2017-11-27 PROCEDURE — 74011250637 HC RX REV CODE- 250/637: Performed by: INTERNAL MEDICINE

## 2017-11-27 PROCEDURE — 74011000250 HC RX REV CODE- 250

## 2017-11-27 PROCEDURE — B2151ZZ FLUOROSCOPY OF LEFT HEART USING LOW OSMOLAR CONTRAST: ICD-10-PCS | Performed by: INTERNAL MEDICINE

## 2017-11-27 PROCEDURE — C1894 INTRO/SHEATH, NON-LASER: HCPCS

## 2017-11-27 PROCEDURE — 74011000250 HC RX REV CODE- 250: Performed by: INTERNAL MEDICINE

## 2017-11-27 RX ORDER — HEPARIN SODIUM 1000 [USP'U]/ML
INJECTION, SOLUTION INTRAVENOUS; SUBCUTANEOUS
Status: COMPLETED
Start: 2017-11-27 | End: 2017-11-27

## 2017-11-27 RX ORDER — LIDOCAINE HYDROCHLORIDE 10 MG/ML
3-20 INJECTION INFILTRATION; PERINEURAL
Status: DISCONTINUED | OUTPATIENT
Start: 2017-11-27 | End: 2017-11-27 | Stop reason: HOSPADM

## 2017-11-27 RX ORDER — HEPARIN SODIUM 200 [USP'U]/100ML
INJECTION, SOLUTION INTRAVENOUS
Status: COMPLETED
Start: 2017-11-27 | End: 2017-11-27

## 2017-11-27 RX ORDER — MIDAZOLAM HYDROCHLORIDE 1 MG/ML
INJECTION, SOLUTION INTRAMUSCULAR; INTRAVENOUS
Status: COMPLETED
Start: 2017-11-27 | End: 2017-11-27

## 2017-11-27 RX ORDER — SODIUM CHLORIDE 0.9 % (FLUSH) 0.9 %
5-10 SYRINGE (ML) INJECTION AS NEEDED
Status: DISCONTINUED | OUTPATIENT
Start: 2017-11-27 | End: 2017-11-27 | Stop reason: SDUPTHER

## 2017-11-27 RX ORDER — MIDAZOLAM HYDROCHLORIDE 1 MG/ML
.5-2 INJECTION, SOLUTION INTRAMUSCULAR; INTRAVENOUS
Status: DISCONTINUED | OUTPATIENT
Start: 2017-11-27 | End: 2017-11-27 | Stop reason: HOSPADM

## 2017-11-27 RX ORDER — SODIUM CHLORIDE 9 MG/ML
50 INJECTION, SOLUTION INTRAVENOUS CONTINUOUS
Status: DISCONTINUED | OUTPATIENT
Start: 2017-11-27 | End: 2017-11-28 | Stop reason: HOSPADM

## 2017-11-27 RX ORDER — VERAPAMIL HYDROCHLORIDE 2.5 MG/ML
INJECTION, SOLUTION INTRAVENOUS
Status: DISPENSED
Start: 2017-11-27 | End: 2017-11-28

## 2017-11-27 RX ORDER — SPIRONOLACTONE 25 MG/1
25 TABLET ORAL DAILY
Status: DISCONTINUED | OUTPATIENT
Start: 2017-11-28 | End: 2017-11-28 | Stop reason: HOSPADM

## 2017-11-27 RX ORDER — FENTANYL CITRATE 50 UG/ML
INJECTION, SOLUTION INTRAMUSCULAR; INTRAVENOUS
Status: COMPLETED
Start: 2017-11-27 | End: 2017-11-27

## 2017-11-27 RX ORDER — NITROGLYCERIN 40 MG/100ML
INJECTION INTRAVENOUS
Status: DISPENSED
Start: 2017-11-27 | End: 2017-11-28

## 2017-11-27 RX ORDER — SODIUM CHLORIDE 0.9 % (FLUSH) 0.9 %
5-10 SYRINGE (ML) INJECTION EVERY 8 HOURS
Status: DISCONTINUED | OUTPATIENT
Start: 2017-11-27 | End: 2017-11-27

## 2017-11-27 RX ORDER — HEPARIN SODIUM 200 [USP'U]/100ML
500 INJECTION, SOLUTION INTRAVENOUS
Status: DISCONTINUED | OUTPATIENT
Start: 2017-11-27 | End: 2017-11-27 | Stop reason: HOSPADM

## 2017-11-27 RX ORDER — HEPARIN SODIUM 1000 [USP'U]/ML
1000-4000 INJECTION, SOLUTION INTRAVENOUS; SUBCUTANEOUS
Status: DISCONTINUED | OUTPATIENT
Start: 2017-11-27 | End: 2017-11-27 | Stop reason: HOSPADM

## 2017-11-27 RX ORDER — FENTANYL CITRATE 50 UG/ML
25-100 INJECTION, SOLUTION INTRAMUSCULAR; INTRAVENOUS
Status: DISCONTINUED | OUTPATIENT
Start: 2017-11-27 | End: 2017-11-27 | Stop reason: HOSPADM

## 2017-11-27 RX ORDER — LIDOCAINE HYDROCHLORIDE 10 MG/ML
INJECTION INFILTRATION; PERINEURAL
Status: COMPLETED
Start: 2017-11-27 | End: 2017-11-27

## 2017-11-27 RX ADMIN — ASPIRIN 81 MG: 81 TABLET, COATED ORAL at 10:21

## 2017-11-27 RX ADMIN — MIDAZOLAM HYDROCHLORIDE 1 MG: 1 INJECTION, SOLUTION INTRAMUSCULAR; INTRAVENOUS at 12:51

## 2017-11-27 RX ADMIN — FENTANYL CITRATE 25 MCG: 50 INJECTION, SOLUTION INTRAMUSCULAR; INTRAVENOUS at 13:12

## 2017-11-27 RX ADMIN — HEPARIN SODIUM 2000 UNITS: 200 INJECTION, SOLUTION INTRAVENOUS at 12:57

## 2017-11-27 RX ADMIN — HEPARIN SODIUM 4000 UNITS: 1000 INJECTION, SOLUTION INTRAVENOUS; SUBCUTANEOUS at 12:58

## 2017-11-27 RX ADMIN — ROSUVASTATIN CALCIUM 20 MG: 10 TABLET, FILM COATED ORAL at 22:35

## 2017-11-27 RX ADMIN — FENTANYL CITRATE 100 MCG: 50 INJECTION, SOLUTION INTRAMUSCULAR; INTRAVENOUS at 12:51

## 2017-11-27 RX ADMIN — LEVOFLOXACIN 500 MG: 5 INJECTION, SOLUTION INTRAVENOUS at 18:22

## 2017-11-27 RX ADMIN — LIDOCAINE HYDROCHLORIDE 3 ML: 10 INJECTION, SOLUTION INFILTRATION; PERINEURAL at 12:54

## 2017-11-27 RX ADMIN — RIVAROXABAN 20 MG: 10 TABLET, FILM COATED ORAL at 18:22

## 2017-11-27 RX ADMIN — Medication 10 ML: at 10:22

## 2017-11-27 RX ADMIN — IOPAMIDOL 70 ML: 612 INJECTION, SOLUTION INTRAVENOUS at 13:35

## 2017-11-27 RX ADMIN — METOPROLOL TARTRATE 50 MG: 50 TABLET ORAL at 22:35

## 2017-11-27 RX ADMIN — MIDAZOLAM HYDROCHLORIDE 0.5 MG: 1 INJECTION, SOLUTION INTRAMUSCULAR; INTRAVENOUS at 13:12

## 2017-11-27 RX ADMIN — METOPROLOL TARTRATE 50 MG: 50 TABLET ORAL at 10:21

## 2017-11-27 RX ADMIN — SODIUM CHLORIDE 25 ML/HR: 900 INJECTION, SOLUTION INTRAVENOUS at 13:02

## 2017-11-27 RX ADMIN — ZOLPIDEM TARTRATE 5 MG: 5 TABLET ORAL at 22:35

## 2017-11-27 RX ADMIN — OMEPRAZOLE 40 MG: 20 CAPSULE, DELAYED RELEASE ORAL at 10:20

## 2017-11-27 RX ADMIN — Medication 10 ML: at 18:21

## 2017-11-27 RX ADMIN — VERAPAMIL HYDROCHLORIDE 3 ML: 2.5 INJECTION INTRAVENOUS at 13:54

## 2017-11-27 RX ADMIN — IRBESARTAN 150 MG: 150 TABLET ORAL at 10:21

## 2017-11-27 RX ADMIN — LIDOCAINE HYDROCHLORIDE 3 ML: 10 INJECTION INFILTRATION; PERINEURAL at 12:54

## 2017-11-27 NOTE — PROGRESS NOTES
Cardiology Progress Note        Patient: Devi Kowalski        Sex: male          DOA: 11/24/2017  YOB: 1950      Age:  79 y.o.        LOS:  LOS: 3 days   Assessment/Plan     Principal Problem:    Dyspnea (11/24/2017)    Active Problems:    UTI (urinary tract infection) (5/6/2013)      Essential hypertension, benign (5/6/2013)      Cardiomyopathy ([facility] (1/27/2015)      CAD (coronary artery disease) (12/3/2015)      Atrial fibrillation ([facility] (46/33/5971)      Diastolic CHF, acute on chronic ([facility] (11/24/2017)      Elevated troponin (11/24/2017)        Plan:  LHC/RHC done today  No obstructive CAD  Patent stent, moderate disease 50% in non dominant RCA    Plan:  DC Lovenox  Start xarelto  Start aldactone 25 mg po daily  May DC home tomorrow if stable, discussed with pt & family                  Subjective:    cc:  No complaints      REVIEW OF SYSTEMS: NO CP, SOB, N,V,D, F,C  Objective:      Visit Vitals    BP (!) 137/92    Pulse 85    Temp 97.5 °F (36.4 °C)    Resp 23    Ht 6' (1.829 m)    Wt 100.2 kg (221 lb)    SpO2 97%    BMI 29.97 kg/m2     Body mass index is 29.97 kg/(m^2). Physical Exam:  General Appearance: Comfortable, not using accessory muscles of respiration. HEENT: SUDHAKAR. HEAD: Atraumatic  NECK: No JVD, no thyroidomeglay. LUNGS: Clear bilaterally. HEART: S1 variable +S2 audible,    ABD: Non-tender, BS Audible    EXT: No edema, and no cysnosis. VASCULAR EXAM: Pulses are intact. PSYCHIATRIC EXAM: Mood is appropriate.     Medication:  Current Facility-Administered Medications   Medication Dose Route Frequency    verapamil (ISOPTIN) 2.5 mg/mL injection        nitroglycerin in 5 % dextrose (TRIDIL) 100 mg/250 mL (400 mcg/mL) infusion        0.9% sodium chloride infusion  50 mL/hr IntraVENous CONTINUOUS    rivaroxaban (XARELTO) tablet 20 mg  20 mg Oral DAILY WITH DINNER    [START ON 11/28/2017] spironolactone (ALDACTONE) tablet 25 mg  25 mg Oral DAILY    furosemide (LASIX) tablet 40 mg  40 mg Oral DAILY    metoprolol tartrate (LOPRESSOR) tablet 50 mg  50 mg Oral Q12H    aspirin delayed-release tablet 81 mg  81 mg Oral DAILY    zolpidem (AMBIEN) tablet 5 mg  5 mg Oral QHS PRN    omeprazole (PRILOSEC) capsule 40 mg  40 mg Oral DAILY    rosuvastatin (CRESTOR) tablet 20 mg  20 mg Oral QHS    irbesartan (AVAPRO) tablet 150 mg  150 mg Oral DAILY    sodium chloride (NS) flush 5-10 mL  5-10 mL IntraVENous Q8H    sodium chloride (NS) flush 5-10 mL  5-10 mL IntraVENous PRN    levoFLOXacin (LEVAQUIN) 500 mg in D5W IVPB  500 mg IntraVENous Q24H               Lab/Data Reviewed: All lab results for the last 24 hours reviewed.      Recent Labs      11/27/17 0356 11/26/17 0352 11/25/17   0125   WBC  8.0  10.3  9.1   HGB  16.7*  16.3*  16.8*   HCT  48.6*  47.3  49.0*   PLT  164  172  193     Recent Labs      11/27/17 0356 11/26/17 0352 11/25/17   0125   NA  144  145  142   K  3.5  3.5  4.0   CL  107  110*  106   CO2  26  28  27   GLU  99  101*  113*   BUN  15  13  13   CREA  1.38*  1.35*  1.31*   CA  8.7  8.5  8.3*       Signed By: Yann Troy MD     November 27, 2017

## 2017-11-27 NOTE — ROUTINE PROCESS
TRANSFER - OUT REPORT:  Verbal report given to Cameron on Abigail Moyer being transferred to care unit for routine post - op   Report consisted of patients Situation, Background, Assessment and   Recommendations(SBAR). Information from the following report(s) SBAR was reviewed with the receiving nurse.     Cath Lab Report:    Procedure:  [x ] LHC  [x ] RHC  [ ] PTCA   [ ] Peripheral   [ ] Pacemaker [ ] KEEGAN  [ ] [address]    Access site:   [x ] Radial     [x ] Brachial     [ ] Femoral    [ ] Jugular   [ ] Chest Wall       Sheath:           [x ] Pulled in Cath Lab   [x ] In place   [ ] To be pulled after:         Closure:          [x ] TR Band [ ] Radial Band  [x ] Right [ ] Left    [ ] Manual Pressure     [ ] Coye Abdiel     [ ] Star Close    [ ] Per Close    [ ] Safe Guard    Site Assessment:   [ x] Clean, Dry, No bleeding    [ ] Minor oozing          [ ] Hematoma: Description:  Lines:        Peripheral IV 11/24/17 Left Antecubital (Active)   Site Assessment Clean 11/27/2017 10:58 AM   Phlebitis Assessment 0 11/27/2017 10:58 AM   Infiltration Assessment 0 11/27/2017 10:58 AM   Dressing Status Clean, dry, & intact 11/27/2017 10:58 AM   Dressing Type Transparent 11/27/2017 10:58 AM   Hub Color/Line Status Pink 11/27/2017 10:58 AM   Action Taken Blood drawn 11/27/2017 10:58 AM   Alcohol Cap Used No 11/27/2017 10:58 AM       Peripheral IV 11/24/17 Left Wrist (Active)   Site Assessment Clean, dry, & intact 11/27/2017 10:58 AM   Phlebitis Assessment 0 11/27/2017 10:58 AM   Infiltration Assessment 0 11/27/2017 10:58 AM   Dressing Status Clean, dry, & intact 11/27/2017 10:58 AM   Dressing Type Transparent 11/27/2017 10:58 AM   Hub Color/Line Status Blue;Flushed 11/27/2017 10:58 AM   Action Taken Open ports on tubing capped 11/27/2017  4:00 AM   Alcohol Cap Used Yes 11/27/2017 10:58 AM     Sheath 11/27/17 (Active)   Site Assessment Clean, dry, & intact 11/27/2017  1:31 PM   Dressing Status Clean, dry, & intact 11/27/2017 1:31 PM   Dressing Type Transparent 11/27/2017  1:31 PM   Hemostasis  TR Band 11/27/2017  1:31 PM       Sheath 11/27/17 (Active)   Site Assessment Clean, dry, & intact 11/27/2017  1:31 PM   Dressing Status Clean, dry, & intact 11/27/2017  1:31 PM   Dressing Type 4 X 4;Transparent 11/27/2017  1:31 PM   Hub Color/Line Status Green 11/27/2017  1:31 PM       Patient Vitals for the past 4 hrs:   Temp Pulse Resp BP SpO2   11/27/17 1331 98.4 °F (36.9 °C) 91 19 (!) 150/106 94 %   11/27/17 1050 98.8 °F (37.1 °C) 96 18 (!) 158/95 95 %         Extended / Orthostatic Vitals:    Vital Signs  Level of Consciousness: Alert (11/27/17 1331)  Temp: 98.4 °F (36.9 °C) (11/27/17 1331)  Temp Source: Oral (11/27/17 1331)  Pulse (Heart Rate): 91 (11/27/17 1331)  Heart Rate Source: Monitor (11/27/17 1331)  Cardiac Rhythm: Atrial fibrillation (11/27/17 1331)  Resp Rate: 19 (11/27/17 1331)  BP: (!) 150/106 (11/27/17 1331)  MAP (Monitor): 106 (11/24/17 1100)  MAP (Calculated): 121 (11/27/17 1331)  BP 1 Location: Right arm (11/27/17 1331)  BP 1 Method: Automatic (11/27/17 1331)  BP Patient Position: Supine (11/27/17 1331)  MEWS Score: 1 (11/27/17 1331)  More BP/Pulse rows needed?: Yes (11/27/17 1050)         Oxygen Therapy  O2 Sat (%): 94 % (11/27/17 1331)  O2 Device: Room air (11/27/17 1331)          Opportunity for questions and clarification was provided.

## 2017-11-27 NOTE — ROUTINE PROCESS
TRANSFER - IN REPORT:    Verbal report received from Kevan Crespo RN(name) on Gavin Juárez  being received from cath lab(unit) for routine progression of care      Report consisted of patients Situation, Background, Assessment and   Recommendations(SBAR). Information from the following report(s) SBAR, Kardex, Procedure Summary and MAR was reviewed with the receiving nurse. Opportunity for questions and clarification was provided. Assessment completed upon patients arrival to unit and care assumed.

## 2017-11-27 NOTE — PROGRESS NOTES
Hospitalist Progress Note    Patient: Keena Arreola MRN: [id number]  CSN: [id number]    YOB: 1950  Age: 79 y.o. Sex: male    DOA: 11/24/2017 LOS:  LOS: 3 days          Chief Complaint:    Afib with RVR, Systolic CHF    Assessment/Plan     1. New Onset Afib with RVR  2. Acute-on-Chronic Mixed Systolic and Diastolic CHF  3. Newly Reduced EF on Echo - EF 40%  4. CAD  5. NST Vtach  6. UTI  7. HTN    1. Patient is currently on BID Lovenox. Will await cath to decide anticoagulation afterwards. Cath scheduled for today with Dr Lotus Dhaliwal. Rate appears better controlled with increase in beta blocker dose. 2. Continue daily Lasix dose. 3. Cardiac cath today with Dr Lotus Dhaliwal. 4. Continue daily aspirin. 6. Continue IV Levaquin for E.coli UTI on culture. 7. Continue current regime. BP stable. DVT Prophy - Lovenox    Dispo: TBD pending cath and cardiology recommendations. Patient Active Problem List   Diagnosis Code    Near syncope R51    UTI (urinary tract infection) N39.0    Essential hypertension, benign I10    Esophageal reflux K21.9    Calculus of kidney N20.0    CHF (congestive heart failure) ([facility]) I50.9    CHF exacerbation ([facility]) I50.9    Cardiomyopathy ([facility] I42.9    GERD (gastroesophageal reflux disease) K21.9    NSTEMI (non-ST elevated myocardial infarction) ([facility] I21.4    CAD (coronary artery disease) R08.75    Diastolic CHF, chronic ([facility]) I50.32    Atrial fibrillation ([facility]) I48.91    Dyspnea X79.28    Diastolic CHF, acute on chronic ([facility]) I50.33    Elevated troponin R74.8       Subjective:    No complaints, did not sleep well last night.     Review of systems:    Constitutional: denies fevers, chills, myalgias  Respiratory: denies SOB, cough  Cardiovascular: denies chest pain, palpitations  Gastrointestinal: denies nausea, vomiting, diarrhea      Vital signs/Intake and Output:  Visit Vitals    /81 (BP 1 Location: Left arm, BP Patient Position: At rest)    Pulse 92    Temp 98 °F (36.7 °C)    Resp 17    Ht 6' (1.829 m)    Wt 100.2 kg (221 lb)    SpO2 97%    BMI 29.97 kg/m2     Current Shift:     Last three shifts:  11/25 1901 - 11/27 0700  In: 1320 [P.O.:1320]  Out: 4 [Urine:4]    Exam:    General: Well developed, alert, NAD, OX3  Head/Neck: NCAT, supple, No masses, No lymphadenopathy  CVS:Irregular rate and rhythm, no M/R/G, S1/S2 heard, no thrill  Lungs:Clear to auscultation bilaterally, no wheezes, rhonchi, or rales  Abdomen: Soft, Nontender, No distention, Normal Bowel sounds, No hepatomegaly  Extremities: No C/C/E, pulses palpable 2+  Skin:normal texture and turgor, no rashes, no lesions  Neuro:grossly normal , follows commands  Psych:appropriate                Labs: Results:       Chemistry Recent Labs      11/27/17 0356 11/26/17 0352 11/25/17 0125   GLU  99  101*  113*   NA  144  145  142   K  3.5  3.5  4.0   CL  107  110*  106   CO2  26  28  27   BUN  15  13  13   CREA  1.38*  1.35*  1.31*   CA  8.7  8.5  8.3*   AGAP  11  7  9   BUCR  11*  10*  10*      CBC w/Diff Recent Labs      11/27/17   0356 11/26/17   0352 11/25/17 0125   WBC  8.0  10.3  9.1   RBC  5.36  5.25  5.43   HGB  16.7*  16.3*  16.8*   HCT  48.6*  47.3  49.0*   PLT  164  172  193   GRANS  65  71  72   LYMPH  19*  13*  14*   EOS  2  2  1      Cardiac Enzymes Recent Labs      11/25/17 0125 11/24/17   2145   CPK  175  182   CKND1  CALCULATION NOT PERFORMED WHEN RESULT IS BELOW LINEAR LIMIT  CALCULATION NOT PERFORMED WHEN RESULT IS BELOW LINEAR LIMIT      Coagulation Recent Labs      11/25/17   0730  11/25/17 0125   APTT  94.6*  87.0*       Lipid Panel Lab Results   Component Value Date/Time    Cholesterol, total 159 11/25/2017 01:25 AM    HDL Cholesterol 40 11/25/2017 01:25 AM    LDL, calculated 103.6 11/25/2017 01:25 AM    VLDL, calculated 15.4 11/25/2017 01:25 AM    Triglyceride 77 11/25/2017 01:25 AM    CHOL/HDL Ratio 4.0 11/25/2017 01:25 AM      BNP No results for input(s): BNPP in the last 72 hours. Liver Enzymes No results for input(s): TP, ALB, TBIL, AP, SGOT, GPT in the last 72 hours.     No lab exists for component: DBIL   Thyroid Studies Lab Results   Component Value Date/Time    TSH 1.62 11/25/2017 01:25 AM        Procedures/imaging: see electronic medical records for all procedures/Xrays and details which were not copied into this note but were reviewed prior to creation of [address] Jovanni Baird, PA-C

## 2017-11-27 NOTE — PROGRESS NOTES
[address] care from [address]. No complaints at this time. Will continue to monitor. Shift Summary- Linens changed, CHG bath done. Will pass on to hold lovenox at 1000. Pt aware that he can not eat after 0600    Bedside shift change report given to Radha Martin (oncoming nurse) by Azeem Clarke (offgoing nurse). Report included the following information SBAR and Kardex.

## 2017-11-27 NOTE — PROGRESS NOTES
TR band and brachial venous sheath to right arm discontinued. Pt tolerated well and both sites are wdl.

## 2017-11-27 NOTE — PROGRESS NOTES
Chart Reviewed. Noted patient admitted to the hospital for further medical management. Care Management to follow up with patient and/or family for transition of care needs prn. Readmission Risk Assessment:     Moderate Risk and MSSP/[facility] patients    RRAT Score:  13-20    Initial Assessment: presents with to the emergency department C/O SOB onset 3 days ago that worsened this morning. Associated sxs include chest congestion, dry cough, rhinorrhea, and wheezing. SOB and wheezing is exacerbated when laying flat. Patient admitted to telemetry unit for dyspnea     Emergency Contact:  See face sheet    Pertinent Medical Hx:     HTN, GERD, basal cell CA pneumonia    PCP/Specialists: [address] [place] [address] Services:       DME:          Moderate Risk Care Transition Plan:  1. Evaluate for [facility] or [facility], SNF, acute rehab, community care coordination of resources. 2. Involve patient/caregiver in assessment, planning, education and implement of intervention. 3. CM daily patient care huddles/interdisciplinary rounds. 4. PCP/Specialist appointment within 5  7 days made prior to discharge. 5. Facilitate transportation and logistics for follow-up appointments. 6. Medication reconciliation [id number]  7. Formal handoff between hospital provider and post-acute provider to transition patient  Handoff to [facility] Nurse Navigator or PCP practice.     Attempted to meet with patient at bedside however patient is in cath lab at this time

## 2017-11-27 NOTE — ROUTINE PROCESS
Cardiac Cath Lab:  Pre Procedure Chart Check     Patients chart was accessed and reviewed for possible and/or scheduled procedure. Creatinine Clearance:  CREATININE: 1.38 MG/DL ABNORMAL (11/27/17 0356)  Estimated creatinine clearance: 63.6 mL/min    Total Contrast  Load:  3 x estimated clearance amount=  190.8   ml    75% of Contrast Load:  0.75 x Total Contrast Load=       143.10 ml    Recent Labs      11/27/17   0356   11/25/17   0730  11/25/17   0125   WBC  8.0   < >   --   9.1   RBC  5.36   < >   --   5.43   HCT  48.6*   < >   --   49.0*   HGB  16.7*   < >   --   16.8*   PLT  164   < >   --   193   APTT   --    --   94.6*  87.0*   NA  144   < >   --   142   K  3.5   < >   --   4.0   BUN  15   < >   --   13   CREA  1.38*   < >   --   1.31*   GFRAA  >60   < >   --   >60   GFRNA  51*   < >   --   55*   CA  8.7   < >   --   8.3*   CPK   --    --    --   175   CKMB   --    --    --   <1.0   CKND1   --    --    --   CALCULATION NOT PERFORMED WHEN RESULT IS BELOW LINEAR LIMIT   TROIQ   --    --    --   0.26*    < > = values in this interval not displayed. BMI: Body mass index is 29.97 kg/(m^2).     ALLERGIES:   Allergies   Allergen Reactions    Pcn [Penicillins] Rash       Lines:        Peripheral IV 11/24/17 Left Antecubital (Active)   Site Assessment Clean 11/27/2017 10:58 AM   Phlebitis Assessment 0 11/27/2017 10:58 AM   Infiltration Assessment 0 11/27/2017 10:58 AM   Dressing Status Clean, dry, & intact 11/27/2017 10:58 AM   Dressing Type Transparent 11/27/2017 10:58 AM   Hub Color/Line Status Pink 11/27/2017 10:58 AM   Action Taken Blood drawn 11/27/2017 10:58 AM   Alcohol Cap Used No 11/27/2017 10:58 AM       Peripheral IV 11/24/17 Left Wrist (Active)   Site Assessment Clean, dry, & intact 11/27/2017 10:58 AM   Phlebitis Assessment 0 11/27/2017 10:58 AM   Infiltration Assessment 0 11/27/2017 10:58 AM   Dressing Status Clean, dry, & intact 11/27/2017 10:58 AM   Dressing Type Transparent 11/27/2017 10:58 AM   Hub Color/Line Status Blue;Flushed 11/27/2017 10:58 AM   Action Taken Open ports on tubing capped 11/27/2017  4:00 AM   Alcohol Cap Used Yes 11/27/2017 10:58 AM          History:    Past Medical History:   Diagnosis Date    Cancer ([facility]     basal cell    Cholestanol storage disease     GERD (gastroesophageal reflux disease)     well controlled with meds    Hypercholesteremia     Hypertension 95[address]    5yrs    Kidney stones     Nausea & vomiting     Pneumonia      Past Surgical History:   Procedure Laterality Date    CARDIAC SURG PROCEDURE UNLIST      CORONARY STENT EA ADDL VESSEL  12/4/2015    CORONARY STENT SINGLE W/PTCA  12/4/2015    HX ADENOIDECTOMY      HX APPENDECTOMY      HX CERVICAL FUSION  05/02/2012    HX HEENT  2013    sinus surgery    HX OTHER SURGICAL      basal cell removal from forehead    HX OTHER SURGICAL  2012    varicose vein surgery    HX TONSILLECTOMY      HX UROLOGICAL  4-13    lithotripsy    LEFT HEART PERCUTANEOUS  12/4/2015    BRYAN CORONARY ARTERIOGRAPH  12/4/2015     Patient Active Problem List   Diagnosis Code    Near syncope R55    UTI (urinary tract infection) N39.0    Essential hypertension, benign I10    Esophageal reflux K21.9    Calculus of kidney N20.0    CHF (congestive heart failure) ([facility]) I50.9    CHF exacerbation ([facility]) I50.9    Cardiomyopathy ([facility] I42.9    GERD (gastroesophageal reflux disease) K21.9    NSTEMI (non-ST elevated myocardial infarction) ([facility] I21.4    CAD (coronary artery disease) P03.62    Diastolic CHF, chronic ([facility]) I50.32    Atrial fibrillation ([facility]) I48.91    Dyspnea O90.69    Diastolic CHF, acute on chronic ([facility]) I50.33    Elevated troponin R74.8

## 2017-11-27 NOTE — PROGRESS NOTES
TRANSFER - OUT REPORT:    Verbal report given to Dolly Stiles RN on Lance Bailey  being transferred to [facility] for routine progression of care       Report consisted of patients Situation, Background, Assessment and   Recommendations(SBAR). Information from the following report(s) Procedure Summary, Intake/Output, MAR and Cardiac Rhythm Sinus Rythm was reviewed with the receiving nurse. Lines:   Peripheral IV 11/24/17 Left Antecubital (Active)   Site Assessment Clean 11/27/2017 10:58 AM   Phlebitis Assessment 0 11/27/2017 10:58 AM   Infiltration Assessment 0 11/27/2017 10:58 AM   Dressing Status Clean, dry, & intact 11/27/2017 10:58 AM   Dressing Type Transparent 11/27/2017 10:58 AM   Hub Color/Line Status Pink 11/27/2017 10:58 AM   Action Taken Blood drawn 11/27/2017 10:58 AM   Alcohol Cap Used No 11/27/2017 10:58 AM       Peripheral IV 11/24/17 Left Wrist (Active)   Site Assessment Clean, dry, & intact 11/27/2017 10:58 AM   Phlebitis Assessment 0 11/27/2017 10:58 AM   Infiltration Assessment 0 11/27/2017 10:58 AM   Dressing Status Clean, dry, & intact 11/27/2017 10:58 AM   Dressing Type Transparent 11/27/2017 10:58 AM   Hub Color/Line Status Blue;Flushed 11/27/2017 10:58 AM   Action Taken Open ports on tubing capped 11/27/2017  4:00 AM   Alcohol Cap Used Yes 11/27/2017 10:58 AM        Opportunity for questions and clarification was provided.       Patient transported with:   Monitor  Registered Nurse  Tech

## 2017-11-27 NOTE — ROUTINE PROCESS
TRANSFER - IN REPORT:    Verbal report received from [facility] on [employer]  being received from care unit for ordered procedure      Report consisted of patients Situation, Background, Assessment and   Recommendations(SBAR). Information from the following report(s) SBAR was reviewed with the receiving nurse. Opportunity for questions and clarification was provided. Assessment completed upon patients arrival to unit and care assumed.        Sheath:                            Peripheral Intravenous Line:  Peripheral IV 11/24/17 Left Antecubital (Active)   Site Assessment Clean 11/27/2017 10:58 AM   Phlebitis Assessment 0 11/27/2017 10:58 AM   Infiltration Assessment 0 11/27/2017 10:58 AM   Dressing Status Clean, dry, & intact 11/27/2017 10:58 AM   Dressing Type Transparent 11/27/2017 10:58 AM   Hub Color/Line Status Pink 11/27/2017 10:58 AM   Action Taken Blood drawn 11/27/2017 10:58 AM   Alcohol Cap Used No 11/27/2017 10:58 AM       Peripheral IV 11/24/17 Left Wrist (Active)   Site Assessment Clean, dry, & intact 11/27/2017 10:58 AM   Phlebitis Assessment 0 11/27/2017 10:58 AM   Infiltration Assessment 0 11/27/2017 10:58 AM   Dressing Status Clean, dry, & intact 11/27/2017 10:58 AM   Dressing Type Transparent 11/27/2017 10:58 AM   Hub Color/Line Status Blue;Flushed 11/27/2017 10:58 AM   Action Taken Open ports on tubing capped 11/27/2017  4:00 AM   Alcohol Cap Used Yes 11/27/2017 10:58 AM       Extended / Orthostatic Vitals:    Vital Signs  Level of Consciousness: Alert (11/27/17 1050)  Temp: 98.8 °F (37.1 °C) (11/27/17 1050)  Temp Source: Oral (11/27/17 1050)  Pulse (Heart Rate): 96 (11/27/17 1050)  Heart Rate Source: Monitor (11/27/17 1050)  Cardiac Rhythm: Sinus arrhythmia (11/27/17 1050)  Resp Rate: 18 (11/27/17 1050)  BP: (!) 158/95 (11/27/17 1050)  MAP (Monitor): 106 (11/24/17 1100)  MAP (Calculated): 116 (11/27/17 1050)  BP 1 Location: Right arm (11/27/17 1050)  BP 1 Method: Automatic (11/27/17 0753)  BP Patient Position: At rest (11/27/17 1050)  MEWS Score: 1 (11/27/17 1050)  More BP/Pulse rows needed?: Yes (11/27/17 1050)         Oxygen Therapy  O2 Sat (%): 95 % (11/27/17 1050)  O2 Device: Room air (11/27/17 1050)    Accompanied by Cath Lab [facility]

## 2017-11-28 VITALS
HEIGHT: 72 IN | TEMPERATURE: 98.1 F | WEIGHT: 224.4 LBS | DIASTOLIC BLOOD PRESSURE: 91 MMHG | SYSTOLIC BLOOD PRESSURE: 135 MMHG | OXYGEN SATURATION: 97 % | HEART RATE: 90 BPM | RESPIRATION RATE: 17 BRPM | BODY MASS INDEX: 30.4 KG/M2

## 2017-11-28 LAB
ANION GAP SERPL CALC-SCNC: 12 MMOL/L (ref 3–18)
BUN SERPL-MCNC: 17 MG/DL (ref 7–18)
BUN/CREAT SERPL: 11 (ref 12–20)
CALCIUM SERPL-MCNC: 8.8 MG/DL (ref 8.5–10.1)
CHLORIDE SERPL-SCNC: 110 MMOL/L (ref 100–108)
CO2 SERPL-SCNC: 21 MMOL/L (ref 21–32)
CREAT SERPL-MCNC: 1.51 MG/DL (ref 0.6–1.3)
ERYTHROCYTE [DISTWIDTH] IN BLOOD BY AUTOMATED COUNT: 13.4 % (ref 11.6–14.5)
GLUCOSE SERPL-MCNC: 107 MG/DL (ref 74–99)
HCT VFR BLD AUTO: 50.2 % (ref 36–48)
HGB BLD-MCNC: 17.2 G/DL (ref 13–16)
MCH RBC QN AUTO: 31 PG (ref 24–34)
MCHC RBC AUTO-ENTMCNC: 34.3 G/DL (ref 31–37)
MCV RBC AUTO: 90.6 FL (ref 74–97)
PLATELET # BLD AUTO: 149 K/UL (ref 135–420)
PMV BLD AUTO: 11.5 FL (ref 9.2–11.8)
POTASSIUM SERPL-SCNC: 5 MMOL/L (ref 3.5–5.5)
RBC # BLD AUTO: 5.54 M/UL (ref 4.7–5.5)
SODIUM SERPL-SCNC: 143 MMOL/L (ref 136–145)
WBC # BLD AUTO: 7.4 K/UL (ref 4.6–13.2)

## 2017-11-28 PROCEDURE — 74011250637 HC RX REV CODE- 250/637: Performed by: INTERNAL MEDICINE

## 2017-11-28 PROCEDURE — 74011250637 HC RX REV CODE- 250/637: Performed by: HOSPITALIST

## 2017-11-28 PROCEDURE — 74011250637 HC RX REV CODE- 250/637: Performed by: FAMILY MEDICINE

## 2017-11-28 PROCEDURE — 85027 COMPLETE CBC AUTOMATED: CPT | Performed by: INTERNAL MEDICINE

## 2017-11-28 PROCEDURE — 80048 BASIC METABOLIC PNL TOTAL CA: CPT | Performed by: INTERNAL MEDICINE

## 2017-11-28 PROCEDURE — 36415 COLL VENOUS BLD VENIPUNCTURE: CPT | Performed by: INTERNAL MEDICINE

## 2017-11-28 RX ORDER — LORAZEPAM 1 MG/1
1 TABLET ORAL
Status: COMPLETED | OUTPATIENT
Start: 2017-11-28 | End: 2017-11-28

## 2017-11-28 RX ORDER — SPIRONOLACTONE 25 MG/1
25 TABLET ORAL DAILY
Qty: 30 TAB | Refills: 0 | Status: SHIPPED | OUTPATIENT
Start: 2017-11-29 | End: 2021-04-09

## 2017-11-28 RX ORDER — LEVOFLOXACIN 250 MG/1
250 TABLET ORAL DAILY
Qty: 4 TAB | Refills: 0 | Status: SHIPPED | OUTPATIENT
Start: 2017-11-28 | End: 2017-12-02

## 2017-11-28 RX ORDER — FUROSEMIDE 40 MG/1
40 TABLET ORAL DAILY
Qty: 60 TAB | Refills: 1 | Status: SHIPPED | OUTPATIENT
Start: 2017-11-28 | End: 2019-08-29 | Stop reason: ALTCHOICE

## 2017-11-28 RX ORDER — METOPROLOL TARTRATE 50 MG/1
50 TABLET ORAL 2 TIMES DAILY
Qty: 60 TAB | Refills: 0 | Status: SHIPPED | OUTPATIENT
Start: 2017-11-28 | End: 2021-04-09

## 2017-11-28 RX ADMIN — LORAZEPAM 1 MG: 1 TABLET ORAL at 05:24

## 2017-11-28 RX ADMIN — FUROSEMIDE 40 MG: 40 TABLET ORAL at 09:03

## 2017-11-28 RX ADMIN — METOPROLOL TARTRATE 50 MG: 50 TABLET ORAL at 09:02

## 2017-11-28 RX ADMIN — SPIRONOLACTONE 25 MG: 25 TABLET, FILM COATED ORAL at 09:03

## 2017-11-28 RX ADMIN — ASPIRIN 81 MG: 81 TABLET, COATED ORAL at 09:03

## 2017-11-28 RX ADMIN — OMEPRAZOLE 40 MG: 20 CAPSULE, DELAYED RELEASE ORAL at 09:03

## 2017-11-28 RX ADMIN — IRBESARTAN 150 MG: 150 TABLET ORAL at 09:03

## 2017-11-28 NOTE — PROGRESS NOTES
Bedside and Verbal shift change report given to KEMAL Fontanez (oncoming nurse) by KEENAN Recinos, RN (offgoing nurse). Report included the following information SBAR, Kardex, Intake/Output, MAR and Recent Results.

## 2017-11-28 NOTE — DISCHARGE INSTRUCTIONS
Atrial Fibrillation: Care Instructions  Your Care Instructions    Atrial fibrillation is an irregular and often fast heartbeat. Treating this condition is important for several reasons. It can cause blood clots, which can travel from your heart to your brain and cause a stroke. If you have a fast heartbeat, you may feel lightheaded, dizzy, and weak. An irregular heartbeat can also increase your risk for heart failure. Atrial fibrillation is often the result of another heart condition, such as high blood pressure or coronary artery disease. Making changes to improve your heart condition will help you stay healthy and active. Follow-up care is a key part of your treatment and safety. Be sure to make and go to all appointments, and call your doctor if you are having problems. It's also a good idea to know your test results and keep a list of the medicines you take. How can you care for yourself at home? Medicines  ? · Take your medicines exactly as prescribed. Call your doctor if you think you are having a problem with your medicine. You will get more details on the specific medicines your doctor prescribes. ? · If your doctor has given you a blood thinner to prevent a stroke, be sure you get instructions about how to take your medicine safely. Blood thinners can cause serious bleeding problems. ? · Do not take any vitamins, over-the-counter drugs, or herbal products without talking to your doctor first.   ? Lifestyle changes  ? · Do not smoke. Smoking can increase your chance of a stroke and heart attack. If you need help quitting, talk to your doctor about stop-smoking programs and medicines. These can increase your chances of quitting for good. ? · Eat a heart-healthy diet. ? · Stay at a healthy weight. Lose weight if you need to.   ? · Limit alcohol to 2 drinks a day for men and 1 drink a day for women. Too much alcohol can cause health problems. ? · Avoid colds and flu.  Get a pneumococcal vaccine shot. If you have had one before, ask your doctor whether you need another dose. Get a flu shot every year. If you must be around people with colds or flu, wash your hands often. Activity  ? · If your doctor recommends it, get more exercise. Walking is a good choice. Bit by bit, increase the amount you walk every day. Try for at least 30 minutes on most days of the week. You also may want to swim, bike, or do other activities. Your doctor may suggest that you join a cardiac rehabilitation program so that you can have help increasing your physical activity safely. ? · Start light exercise if your doctor says it is okay. Even a small amount will help you get stronger, have more energy, and manage stress. Walking is an easy way to get exercise. Start out by walking a little more than you did in the hospital. Gradually increase the amount you walk. ? · When you exercise, watch for signs that your heart is working too hard. You are pushing too hard if you cannot talk while you are exercising. If you become short of breath or dizzy or have chest pain, sit down and rest immediately. ? · Check your pulse regularly. Place two fingers on the artery at the palm side of your wrist, in line with your thumb. If your heartbeat seems uneven or fast, talk to your doctor. When should you call for help? Call 911 anytime you think you may need emergency care. For example, call if:  ? · You have symptoms of a heart attack. These may include:  ¨ Chest pain or pressure, or a strange feeling in the chest.  ¨ Sweating. ¨ Shortness of breath. ¨ Nausea or vomiting. ¨ Pain, pressure, or a strange feeling in the back, neck, jaw, or upper belly or in one or both shoulders or arms. ¨ Lightheadedness or sudden weakness. ¨ A fast or irregular heartbeat. After you call 911, the  may tell you to chew 1 adult-strength or 2 to 4 low-dose aspirin. Wait for an ambulance. Do not try to drive yourself.    ? · You have symptoms of a stroke. These may include:  ¨ Sudden numbness, tingling, weakness, or loss of movement in your face, arm, or leg, especially on only one side of your body. ¨ Sudden vision changes. ¨ Sudden trouble speaking. ¨ Sudden confusion or trouble understanding simple statements. ¨ Sudden problems with walking or balance. ¨ A sudden, severe headache that is different from past headaches. ? · You passed out (lost consciousness). ?Call your doctor now or seek immediate medical care if:  ? · You have new or increased shortness of breath. ? · You feel dizzy or lightheaded, or you feel like you may faint. ? · Your heart rate becomes irregular. ? · You can feel your heart flutter in your chest or skip heartbeats. Tell your doctor if these symptoms are new or worse. ? Watch closely for changes in your health, and be sure to contact your doctor if you have any problems. Where can you learn more? Go to http://johnKaritKarmaevaristo.info/. Enter U020 in the search box to learn more about \"Atrial Fibrillation: Care Instructions. \"  Current as of: September 21, 2016  Content Version: 11.4  © 9401-7376 [employer]. Care instructions adapted under license by [employer] (which disclaims liability or warranty for this information). If you have questions about a medical condition or this instruction, always ask your healthcare        Learning About Coronary Artery Disease (CAD)  What is coronary artery disease? Coronary artery disease (CAD) occurs when plaque builds up in the arteries that bring oxygen-rich blood to your heart. Plaque is a fatty substance made of cholesterol, calcium, and other substances in the blood. This process is called hardening of the arteries, or atherosclerosis. What happens when you have coronary artery disease? · Plaque may narrow the coronary arteries. Narrowed arteries cause poor blood flow.  This can lead to angina symptoms such as chest pain or discomfort. If blood flow is completely blocked, you could have a heart attack. · You can slow CAD and reduce the risk of future problems by making changes in your lifestyle. These include quitting smoking and eating heart-healthy foods. · Treatments for CAD, along with changes in your lifestyle, can help you live a longer and healthier life. How can you prevent coronary artery disease? · Do not smoke. It may be the best thing you can do to prevent heart disease. If you need help quitting, talk to your doctor about stop-smoking programs and medicines. These can increase your chances of quitting for good. · Be active. Get at least 30 minutes of exercise on most days of the week. Walking is a good choice. You also may want to do other activities, such as running, swimming, cycling, or playing tennis or team sports. · Eat heart-healthy foods. Eat more fruits and vegetables and less foods that contain saturated and trans fats. Limit alcohol, sodium, and sweets. · Stay at a healthy weight. Lose weight if you need to. · Manage other health problems such as diabetes, high blood pressure, and high cholesterol. · Manage stress. Stress can hurt your heart. To keep stress low, talk about your problems and feelings. Don't keep your feelings hidden. · If you have talked about it with your doctor, take a low-dose aspirin every day. Aspirin can help certain people lower their risk of a heart attack or stroke. But taking aspirin isn't right for everyone, because it can cause serious bleeding. Do not start taking daily aspirin unless your doctor knows about it. How is coronary artery disease treated? · Your doctor will suggest that you make lifestyle changes. For example, your doctor may ask you to eat healthy foods, quit smoking, lose extra weight, and be more active. · You will have to take medicines. · Your doctor may suggest a procedure to open narrowed or blocked arteries. This is called angioplasty.  Or your doctor may suggest using healthy blood vessels to create detours around narrowed or blocked arteries. This is called bypass surgery. Follow-up care is a key part of your treatment and safety. Be sure to make and go to all appointments, and call your doctor if you are having problems. It's also a good idea to know your test results and keep a list of the medicines you take. Where can you learn more? Go to http://john-evaristo.info/. Enter [phone] [id number] in the search box to learn more about \"Learning About Coronary Artery Disease (CAD). \"  Current as of: September 21, 2016  Content Version: 11.4  © 6528-2455 [employer]. Care instructions adapted under license by [employer] (which disclaims liability or warranty for this information). If you have questions about a medical condition or this instruction, always ask your healthcare professional. [place] [address] any warranty or liability for your use of this information. professional. [place] [address] any warranty or liability for your use of this information. Cardiac Catheterization/Angiography Discharge Instructions    *Check the puncture site frequently for swelling or bleeding. If you see any bleeding, lie down and apply pressure over the area with a clean town or washcloth. Notify your doctor for any redness, swelling, drainage or oozing from the puncture site. Notify your doctor for any fever or chills. *If the leg or arm with the puncture becomes cold, numb or painful, go to your nearest emergency room. *Activity should be limited for the next 48 hours. Climb stairs as little as possible and avoid any stooping, bending or strenuous activity for 48 hours. No heavy lifting (anything over 10 pounds) for three days. *Do not drive for 48 hours. *You may resume your usual diet.  Drink more fluids than usual.    *Have a responsible person drive you home and stay with you for at least 24 hours after your heart catheterization/angiography. *You may remove the bandage from your Right and Arm in 24 hours. You may shower in 24 hours. No tub baths, hot tubs or swimming for one week. Do not place any lotions, creams, powders, ointments over the puncture site for one week. You may place a clean band-aid over the puncture site each day for 5 days. Change this daily. Sedation for a Medical Procedure: Care Instructions  Your Care Instructions    For a minor procedure or surgery, you will get a sedative to help you relax. This drug will make you sleepy. It is usually given in a vein (by IV). A shot may also be used to numb the area. If you had local anesthesia, you may feel some pain and discomfort as it wears off. If you have pain, don't be afraid to say so. Pain medicine works better if you take it before the pain gets bad. Common side effects from sedation include:  · Feeling sleepy. (Your doctors and nurses will make sure you are not too sleepy to go home.)  · Nausea and vomiting. This usually does not last long. · Feeling tired. Follow-up care is a key part of your treatment and safety. Be sure to make and go to all appointments, and call your doctor if you are having problems. It's also a good idea to know your test results and keep a list of the medicines you take. How can you care for yourself at home? Activity  ? · Don't do anything for 24 hours that requires attention to detail. It takes time for the medicine effects to completely wear off.   ? · For your safety, you should not drive or operate any machinery that could be dangerous until the medicine wears off and you can think clearly and react easily. ? · Rest when you feel tired. Getting enough sleep will help you recover. Diet  ? · You can eat your normal diet, unless your doctor gives you other instructions. If your stomach is upset, try clear liquids and bland, low-fat foods like plain toast or rice. ? · Drink plenty of fluids (unless your doctor tells you not to). ? · Don't drink alcohol for 24 hours. Medicines  ? · Be safe with medicines. Read and follow all instructions on the label. ¨ If the doctor gave you a prescription medicine for pain, take it as prescribed. ¨ If you are not taking a prescription pain medicine, ask your doctor if you can take an over-the-counter medicine. ? · If you think your pain medicine is making you sick to your stomach:  ¨ Take your medicine after meals (unless your doctor has told you not to). ¨ Ask your doctor for a different pain medicine. When should you call for help? Call 911 anytime you think you may need emergency care. For example, call if:  ? · You have severe trouble breathing. ? · You passed out (lost consciousness). ?Call your doctor now or seek immediate medical care if:  ? · You have trouble breathing. ? · You have ongoing or worsening nausea or vomiting. ? · You have a fever. ? · You have a new or worse headache. ? · The medicine is not wearing off and you can't think clearly. ? Watch closely for changes in your health, and be sure to contact your doctor if:  ? · You do not get better as expected. Where can you learn more? Go to http://john-evaristo.info/. Enter L630 in the search box to learn more about \"Sedation for a Medical Procedure: Care Instructions. \"  Current as of: August 14, 2016  Content Version: 11.4  © 1849-7876 [employer]. Care instructions adapted under license by [employer] (which disclaims liability or warranty for this information). If you have questions about a medical condition or this instruction, always ask your healthcare professional. [place] [address] any warranty or liability for your use of this information.       DISCHARGE SUMMARY from Nurse    PATIENT INSTRUCTIONS:    After general anesthesia or intravenous sedation, for 24 hours or while taking prescription Narcotics:  · Limit your activities  · Do not drive and operate hazardous machinery  · Do not make important personal or business decisions  · Do  not drink alcoholic beverages  · If you have not urinated within 8 hours after discharge, please contact your surgeon on call. Report the following to your surgeon:  · Excessive pain, swelling, redness or odor of or around the surgical area  · Temperature over 100.5  · Nausea and vomiting lasting longer than 4 hours or if unable to take medications  · Any signs of decreased circulation or nerve impairment to extremity: change in color, persistent  numbness, tingling, coldness or increase pain  · Any questions    What to do at Home:  Recommended activity: Activity as tolerated. If you experience any of the following symptoms new or worsening of chest pain, bleeding at insertion site, please follow up with Dr. Candido Freedman. *  Please give a list of your current medications to your Primary Care Provider. *  Please update this list whenever your medications are discontinued, doses are      changed, or new medications (including over-the-counter products) are added. *  Please carry medication information at all times in case of emergency situations. These are general instructions for a healthy lifestyle:    No smoking/ No tobacco products/ Avoid exposure to second hand smoke  Surgeon General's Warning:  Quitting smoking now greatly reduces serious risk to your health. Obesity, smoking, and sedentary lifestyle greatly increases your risk for illness    A healthy diet, regular physical exercise & weight monitoring are important for maintaining a healthy lifestyle    You may be retaining fluid if you have a history of heart failure or if you experience any of the following symptoms:  Weight gain of 3 pounds or more overnight or 5 pounds in a week, increased swelling in our hands or feet or shortness of breath while lying flat in bed.   Please call your doctor as soon as you notice any of these symptoms; do not wait until your next office visit. Recognize signs and symptoms of STROKE:    F-face looks uneven    A-arms unable to move or move unevenly    S-speech slurred or non-existent    T-time-call 911 as soon as signs and symptoms begin-DO NOT go       Back to bed or wait to see if you get better-TIME IS BRAIN. Warning Signs of HEART ATTACK     Call 911 if you have these symptoms:   Chest discomfort. Most heart attacks involve discomfort in the center of the chest that lasts more than a few minutes, or that goes away and comes back. It can feel like uncomfortable pressure, squeezing, fullness, or pain.  Discomfort in other areas of the upper body. Symptoms can include pain or discomfort in one or both arms, the back, neck, jaw, or stomach.  Shortness of breath with or without chest discomfort.  Other signs may include breaking out in a cold sweat, nausea, or lightheadedness. Don't wait more than five minutes to call 911 - MINUTES MATTER! Fast action can save your life. Calling 911 is almost always the fastest way to get lifesaving treatment. Emergency Medical Services staff can begin treatment when they arrive -- up to an hour sooner than if someone gets to the hospital by car. The discharge information has been reviewed with the patient. The patient verbalized understanding. Discharge medications reviewed with the patient and appropriate educational materials and side effects teaching were provided. ___________________________________________________________________________________________________________________________________    Patient armband removed and shredded.

## 2017-11-28 NOTE — PROCEDURES
[address] [facility] CATH LAB    Name:  Yolanda Webster  MR#:  [id number]  :  1950  Account #:  [de-identified]  Date of Adm:  2017  Date of Service:  2017      ESTIMATED BLOOD LOSS: Less than 50 mL. SPECIMENS REMOVED: None. INDICATIONS FOR PROCEDURE: Acute congestive heart failure,  cardiomyopathy, new onset of atrial fibrillation. PROCEDURES PERFORMED  1. Left heart catheterization. 2. Selective coronary angiogram.  3. Right heart catheterization. COUNSELING: Risks, benefits, and alternatives were discussed in  detail with the patient and family. They all agreed to proceed. PROCEDURE AND TECHNIQUE: The patient was brought to the  cardiac catheterization lab in a fasting and nonsedated state. The  patient was prepped and draped in the usual sterilized fashion. The  right antecubital venous access was converted into 6-Latvian sheath  venous access by giving local anesthetic, and then right radial area  was anesthetized with local anesthetic. A 6-Latvian sheath was placed  under fluoroscopic guidance without any complication, and then a 5-  [place] JR4 catheter was used to perform the left heart catheterization  and selective right coronary angiography. A 5-Latvian JL3.5 diagnostic  catheter was used to perform selective angiography of the left coronary  artery. Multiple angiographic views were acquired. The patient  remained hemodynamically stable, and then a 6-Latvian Vadito catheter  was advanced under fluoroscopic guidance and right heart  catheterization was performed without any complication. FINDINGS    HEMODYNAMICS  1. Aortic saturation was 93.5%. PA saturation was 76.9%. 2. LV was 119/6 with LVEDP of 15 mmHg. 3. Aortic pressure was 135/84. 4. Pulmonary capillary wedge mean pressure was 25 mmHg. A-wave  was 26 and V-wave was 32 mmHg. 5. PA pressure 36/21 with mean PA pressure of 27 mmHg. 6. RV 44/4 mmHg.   7. RA mean pressure is 10, A-wave is 12, V-wave is 11 mmHg. 8. Sridhar cardiac output and cardiac index is 7.65 liters per minute and  cardiac index is 3.44 liters per minute per square meter. 9. Thermodilution cardiac output and cardiac index have some error,  which was 4.70 liters per minute. Cardiac output and Sridhar cardiac  index was 2.11 liters per minute per square meter. 10. PVR was less than 0.26 units by using Sridhar cardiac output index  formula. CORONARY ANATOMY  1. This is a left dominant coronary anatomy. 2. Left main artery is a large vessel, divides into large left circumflex  system and LAD. Left main artery is short in length, normal in  diameter, without any disease in the ostium, body, and at the site of  bifurcation. 3. LAD: The LAD is a large vessel, patent in its ostium, proximal, mid,  and distal area, reaching to the apex, wrap around artery in the apex,  divides into 2 branches, superior and inferior branches, otherwise  patent LAD. 4. D1, D2, and D3 are also patent without any disease. 5. Left circumflex artery is a large vessel, patent in its ostium, proximal  area, mid and distal area, gives rise to LPDA, which is patent. OM1, 2,  and 3 branches are also patent without any significant disease. Mid left  circumflex artery stent is without any in-stent restenosis, normal.  6. Right coronary artery is a nondominant small vessel, has in the mid  area a 50% lesion. RV marginal branch also has 50% to 60% lesion,  which marginal branch is less than a 1.2 mm vessel, and RCA is also  less than a 2 mm vessel, not supplying any significant area of  myocardium. Will be treated medically. CONCLUSION  1. Patent large coronary arteries. 2. Patent normal left main artery, patent normal left anterior  descending. 3. Patent normal left circumflex artery. 4. Patent normal stent in the large left circumflex mid artery. 5. Single-vessel, nondominant right coronary artery moderate disease  that will be treated medically.  No indication to fix any blockage on the  small nondominant right coronary artery at this point. 6. Left ventricular end-diastolic pressure is 15 mmHg. 7. Mildly elevated right-sided pressure. 8. There is no evidence of any significant pulmonary hypertension. RECOMMENDATIONS: I will discontinue the Lovenox and start  Xarelto. The patient's atrial fibrillation is well controlled on metoprolol. I  will start the Aldactone because the patient is having hypokalemia with  some diuretic treatment. Continue with metoprolol, Aldactone, Xarelto,  and risk factor management. If the patient continues to do well on rate  control treatment, we will continue with that. If the patient have symptoms, then we will try to convert into normal rhythm by KEEGAN cardioversion or antiarrhythmic medication. Discussed with the  patient and family. They all agree.     Miguel Triplett MD MA / CRISTIAN  D:  11/27/2017   18:23  T:  11/28/2017   09:35  Job #:  [id number]

## 2017-11-28 NOTE — PROGRESS NOTES
D/c plan: anticipate home today  Plan d/c home today. He will follow up with cardiology tomorrow as per avs. Patient does not anticipate no other needs at this time. Denies needs for DME equipment  Care Management Interventions  PCP Verified by CM:  Yes  Transition of Care Consult (CM Consult): Discharge Planning  Current Support Network: Lives with Spouse  Confirm Follow Up Transport: Family  Plan discussed with Pt/Family/Caregiver: Yes  Freedom of Choice Offered: Yes  Discharge Location  Discharge Placement: Home

## 2017-11-28 NOTE — DISCHARGE SUMMARY
Discharge Summary    Patient: Celia Eli MRN: [id number]  CSN: [id number]    YOB: 1950  Age: 79 y.o.   Sex: male    DOA: 11/24/2017 LOS:  LOS: 4 days   Discharge Date:      Primary Care Provider:  Layne Varma MD    Admission Diagnoses: Atrial fibrillation [facility])    Discharge Diagnoses:    Problem List as of 11/28/2017  Date Reviewed: 11/24/2017          Codes Class Noted - Resolved    Atrial fibrillation ([facility] ICD-10-CM: I48.91  ICD-9-CM: 427.31  11/24/2017 - Present        * (Principal)Dyspnea ICD-10-CM: R06.00  ICD-9-CM: 786.09  11/24/2017 - Present        Diastolic CHF, acute on chronic ([facility] ICD-10-CM: I50.33  ICD-9-CM: 428.33, 428.0  11/24/2017 - Present        Elevated troponin ICD-10-CM: R74.8  ICD-9-CM: 790.6  11/24/2017 - Present        CAD (coronary artery disease) ICD-10-CM: I25.10  ICD-9-CM: 414.00  12/3/2015 - Present        Diastolic CHF, chronic (HCC) ICD-10-CM: I50.32  ICD-9-CM: 428.32, 428.0  12/3/2015 - Present        NSTEMI (non-ST elevated myocardial infarction) ([facility] ICD-10-CM: I21.4  ICD-9-CM: 410.70  12/2/2015 - Present        Cardiomyopathy ([facility] ICD-10-CM: I42.9  ICD-9-CM: 425.4  1/27/2015 - Present        GERD (gastroesophageal reflux disease) ICD-10-CM: K21.9  ICD-9-CM: 530.81  1/27/2015 - Present        CHF (congestive heart failure) ([facility] ICD-10-CM: I50.9  ICD-9-CM: 428.0  1/7/2015 - Present        CHF exacerbation ([facility] ICD-10-CM: I50.9  ICD-9-CM: 428.0  1/7/2015 - Present        Calculus of kidney ICD-10-CM: N20.0  ICD-9-CM: 592.0  5/7/2013 - Present        Near syncope ICD-10-CM: R55  ICD-9-CM: 780.2  5/6/2013 - Present        UTI (urinary tract infection) ICD-10-CM: N39.0  ICD-9-CM: 599.0  5/6/2013 - Present        Essential hypertension, benign ICD-10-CM: I10  ICD-9-CM: 401.1  5/6/2013 - Present        Esophageal reflux ICD-10-CM: K21.9  ICD-9-CM: 530.81  5/6/2013 - Present        RESOLVED: Spinal stenosis of cervical region ICD-10-CM: M48.02  ICD-9-CM: 723.0  4/30/2012 - 5/3/2012              Discharge Medications:     Current Discharge Medication List      START taking these medications    Details   metoprolol tartrate (LOPRESSOR) 50 mg tablet Take 1 Tab by mouth two (2) times a day. Qty: 60 Tab, Refills: 0      rivaroxaban (XARELTO) 20 mg tab tablet Take 1 Tab by mouth daily (with dinner). Qty: 30 Tab, Refills: 0      spironolactone (ALDACTONE) 25 mg tablet Take 1 Tab by mouth daily. Qty: 30 Tab, Refills: 0      levoFLOXacin (LEVAQUIN) 250 mg tablet Take 1 Tab by mouth daily for 4 doses. Qty: 4 Tab, Refills: 0         CONTINUE these medications which have CHANGED    Details   furosemide (LASIX) 40 mg tablet Take 1 Tab by mouth daily. Qty: 60 Tab, Refills: 1         CONTINUE these medications which have NOT CHANGED    Details   dilTIAZem ER (CARDIZEM LA) 180 mg Tb24 tablet Take 180 mg by mouth daily. omeprazole (PRILOSEC) 40 mg capsule Take 40 mg by mouth daily. Cetirizine (ZYRTEC) 10 mg cap Take 10 mg by mouth. aspirin 81 mg chewable tablet Take 1 Tab by mouth daily. Qty: 90 Tab, Refills: 1      rosuvastatin (CRESTOR) 20 mg tablet Take 20 mg by mouth nightly. irbesartan (AVAPRO) 150 mg tablet Take 1 tablet by mouth daily. Qty: 20 tablet, Refills: 0         STOP taking these medications       clopidogrel (PLAVIX) 75 mg tab Comments:   Reason for Stopping:               Discharge Condition: Good    Procedures : Cardiac Catheterization    Consults: Cardiology      PHYSICAL EXAM   Visit Vitals    BP (!) 135/91 (BP 1 Location: Left arm, BP Patient Position: At rest)    Pulse 90    Temp 98.1 °F (36.7 °C)    Resp 17    Ht 6' (1.829 m)    Wt 101.8 kg (224 lb 6.4 oz)    SpO2 97%    BMI 30.43 kg/m2     General: Awake, cooperative, no acute distress    HEENT: NC, Atraumatic. PERRLA, EOMI. Anicteric sclerae. Lungs:  CTA Bilaterally. No Wheezing/Rhonchi/Rales.   Heart:  Irregular  rhythm,  No murmur, No Rubs, No Gallops  Abdomen: Soft, Non distended, Non tender. +Bowel sounds,   Extremities: No c/c/e  Psych:   Not anxious or agitated. Neurologic:  No acute neurological deficits. Admission HPI : Isaías Wright is a 79 y.o. male with PMH of HTN, CAD, S/P stents X2, diastolic CHF, HLP, GERD who came with dyspnea and chest congested for 3 days. He states that his shortness of breathing worsening at night when laying flat, . Chest congested with wheezing but no fevers/chills. It was associated with nausea, no vomiting. Denies CP/numbness/tingling. He woke up this am because he could not be able to breathing which prompted his ER visit.      At ER,  EKG revealed with new onset A fib, blood tests revealed with elevated troponin with known cardiac history. Potassium was low, heparin started. He is to admit for further care. Hospital Course : This patient was admitted to medical services on November 24, 2017 for atrial fibrillation with rapid ventricular rate. Given the patient's cardiac history, cardiology was consulted in regards to this patient. Dr Carlos Hoskins saw the patient and ultimately decided that a cardiac catheterization was required. The patient underwent the cardiac cath which revealed notable CAD without any obvious stentable lesions, and showed his previous cardiac stents. The patient tolerated the procedure well without complication. The patient's heart rate was controlled, but he remained in afib while hospitalized. Prior to discharge the care plan was discussed with Dr Carlos Hoskins and the patient will follow up with him upon discharge. The patient was also found to have an E.coli UTI upon admission, positive blood cx on 11/24, and was being treated with 3 days of IV Levaquin while inpatient, and will be continued upon discharge for 4 more days. Activity: Activity as tolerated    Diet: Cardiac Diet    Follow-up: This patient was instructed to follow up with his PCP upon discharge.  He was also instructed to follow up with Dr Maria E Nick, cardiologist, upon discharge regarding his Afib. Disposition: This patient will be discharged home in good condition. The patient underwent cardiac cath without complications and has remained stable since, without signs of hematoma or bleeding from cath site. Minutes spent on discharge: 35 min       Labs: Results:       Chemistry Recent Labs      11/28/17 0645 11/27/17 0356 11/26/17 0352   GLU  107*  99  101*   NA  143  144  145   K  5.0  3.5  3.5   CL  110*  107  110*   CO2  21  26  28   BUN  17  15  13   CREA  1.51*  1.38*  1.35*   CA  8.8  8.7  8.5   AGAP  12  11  7   BUCR  11*  11*  10*      CBC w/Diff Recent Labs      11/28/17 0645 11/27/17 0356 11/26/17 0352   WBC  7.4  8.0  10.3   RBC  5.54*  5.36  5.25   HGB  17.2*  16.7*  16.3*   HCT  50.2*  48.6*  47.3   PLT  149  164  172   GRANS   --   65  71   LYMPH   --   19*  13*   EOS   --   2  2      Cardiac Enzymes No results for input(s): CPK, CKND1, ABHILASH in the last 72 hours. No lab exists for component: CKRMB, TROIP   Coagulation No results for input(s): PTP, INR, APTT in the last 72 hours. No lab exists for component: INREXT, INREXT    Lipid Panel Lab Results   Component Value Date/Time    Cholesterol, total 159 11/25/2017 01:25 AM    HDL Cholesterol 40 11/25/2017 01:25 AM    LDL, calculated 103.6 11/25/2017 01:25 AM    VLDL, calculated 15.4 11/25/2017 01:25 AM    Triglyceride 77 11/25/2017 01:25 AM    CHOL/HDL Ratio 4.0 11/25/2017 01:25 AM      BNP No results for input(s): BNPP in the last 72 hours. Liver Enzymes No results for input(s): TP, ALB, TBIL, AP, SGOT, GPT in the last 72 hours.     No lab exists for component: DBIL   Thyroid Studies Lab Results   Component Value Date/Time    TSH 1.62 11/25/2017 01:25 AM            Significant Diagnostic Studies: Xr Chest Sngl V    Result Date: 11/24/2017  EXAM: One-view chest CLINICAL HISTORY: Short of breath , COMPARISON: None FINDINGS: Frontal view of the chest demonstrate clear lungs. Cardiac silhouette is normal in size and contour. No acute bony or soft tissue abnormality. IMPRESSION: No acute pulmonary process identified. No results found for this or any previous visit.         CC: Desmond Milian MD

## 2017-11-28 NOTE — ROUTINE PROCESS
Bedside and Verbal shift change report given to KEENAN Recinos RN (oncoming nurse) by KEENAN Montgomery RN (offgoing nurse). Report included the following information SBAR, Kardex, Intake/Output and MAR.

## 2017-11-28 NOTE — PROGRESS NOTES
1945:  Received patient from L. 1014 Lulu Garcia RN. Shift Summary:  Patient c/o anxiety at 0500, states, \"my mind just won't shut down\". Patient given one time dose of PO Ativan per Dr. Eliseo Lockett orders with good relief of anxiety. Oncoming RN aware. See flow sheet and MAR.

## 2017-11-28 NOTE — ROUTINE PROCESS
Dual AVS reviewed with Kota Jones RN. All medications reviewed individually with patient. Opportunities for questions and concerns provided. Patient discharged via (mode of transport ie. Car, ambulance or air transport) car. Patient's arm band appropriately discarded.

## 2017-12-01 LAB
ATRIAL RATE: 85 BPM
CALCULATED R AXIS, ECG10: 31 DEGREES
CALCULATED T AXIS, ECG11: 71 DEGREES
DIAGNOSIS, 93000: NORMAL
Q-T INTERVAL, ECG07: 340 MS
QRS DURATION, ECG06: 98 MS
QTC CALCULATION (BEZET), ECG08: 464 MS
VENTRICULAR RATE, ECG03: 112 BPM

## 2019-08-29 ENCOUNTER — OFFICE VISIT (OUTPATIENT)
Dept: CARDIOLOGY CLINIC | Age: 69
End: 2019-08-29

## 2019-08-29 VITALS
BODY MASS INDEX: 30.2 KG/M2 | HEIGHT: 72 IN | HEART RATE: 72 BPM | OXYGEN SATURATION: 98 % | SYSTOLIC BLOOD PRESSURE: 120 MMHG | WEIGHT: 223 LBS | DIASTOLIC BLOOD PRESSURE: 72 MMHG

## 2019-08-29 DIAGNOSIS — I48.91 ATRIAL FIBRILLATION, UNSPECIFIED TYPE (HCC): Primary | ICD-10-CM

## 2019-08-29 DIAGNOSIS — I10 ESSENTIAL HYPERTENSION, BENIGN: Primary | ICD-10-CM

## 2019-08-29 NOTE — PROGRESS NOTES
HPI: A 72-year old male referred by Dr. Man Paz for evaluation for atrial fibrillation and possible management options. He was talking to his primary care physician Dr. Florina Anderson as well. He has a history of atrial fibrillation diagnosed at least a few years ago around the time he had stenting to his left circumflex. At that time he presented with symptoms of dyspnea. He has had no recurrent dyspnea or chest pain. He denies syncope, PND, edema. He does have some possible fatigue at times with dyspnea but overall he is doing quite well. He is here to discuss options. He is taking Xarelto without bleeding issues. Impression/Plan:  1. History of persistent atrial fibrillation with minimal symptoms, rare palpitations and dyspnea, but tolerating relatively well at least for the past few years. 2. Chronic Xarelto use. 3. Chronic metoprolol and Cardizem use. 4. History of coronary artery disease with stenting of the circumflex a few years ago, stable. 5. Echocardiogram recently with normal left ventricular function but moderate left atrial enlargement. 6. Dyslipidemia on statin. At this point I had a lengthy discussion about options which included possible rate control vs antiarrhythmic vs KEEGAN and a trial of cardioversion or even ablation. Given the fact that he has been in atrial fibrillation for a period of time I am unsure if I can even convert him. Therefore, I recommended a KEEGAN and a trial of cardioversion and will make a final decision. If he is easily converted and his atrium is not severely dilated, I would likely schedule him for an atrial fibrillation ablation with sotalol loading. If he has massively dilated left atrium and I cannot convert him, it may be reasonable to consider amiodarone. All questions answered.      Total care time spent in reviewing the case, multiple EMR databases, physician notes, procedure notes, examining the patient, and documentation, is 60 minutes.      Discussed in details with patient. All questions were answered to their full satisfaction. Risk, benefit and alternatives were discussed. More than 50% time spent in counseling and coordination of care. Past Medical History:   Diagnosis Date    Cancer ([facility]     basal cell    Cholestanol storage disease     GERD (gastroesophageal reflux disease)     well controlled with meds    Hypercholesteremia     Hypertension 20007    5yrs    Kidney stones     Nausea & vomiting     Pneumonia        Current Outpatient Medications   Medication Sig Dispense Refill    metoprolol tartrate (LOPRESSOR) 50 mg tablet Take 1 Tab by mouth two (2) times a day. 60 Tab 0    rivaroxaban (XARELTO) 20 mg tab tablet Take 1 Tab by mouth daily (with dinner). 30 Tab 0    spironolactone (ALDACTONE) 25 mg tablet Take 1 Tab by mouth daily. 30 Tab 0    dilTIAZem ER (CARDIZEM LA) 180 mg Tb24 tablet Take 180 mg by mouth daily.  omeprazole (PRILOSEC) 40 mg capsule Take 40 mg by mouth daily.  Cetirizine (ZYRTEC) 10 mg cap Take 10 mg by mouth.  aspirin 81 mg chewable tablet Take 1 Tab by mouth daily. 90 Tab 1    rosuvastatin (CRESTOR) 20 mg tablet Take 20 mg by mouth nightly.  irbesartan (AVAPRO) 150 mg tablet Take 1 tablet by mouth daily. 20 tablet 0       Social History   reports that he has never smoked. He has never used smokeless tobacco.   reports that he does not drink alcohol. Family History  family history is not on file. Review of Systems  Except as stated above include:  Constitutional: Negative for fever, chills and malaise/fatigue. HEENT: No congestion or recent URI. Gastrointestinal: No nausea, vomiting, abdominal pain, bloody stools. Pulmonary:  Negative except as stated above. Cardiac:  Negative except as stated above. Musculoskeletal: Negative except as stated above. Neurological:  No localized symptoms. Skin:  Negative except as stated above. Psych:  Negative except as stated above.   Endocrine:  Negative except as stated above. PHYSICAL EXAM  BP Readings from Last 3 Encounters:   08/29/19 120/72   11/28/17 (!) 135/91   12/04/15 143/69     Pulse Readings from Last 3 Encounters:   08/29/19 72   11/28/17 90   12/04/15 60     Wt Readings from Last 3 Encounters:   08/29/19 101.2 kg (223 lb)   11/28/17 101.8 kg (224 lb 6.4 oz)   12/04/15 100.5 kg (221 lb 8 oz)     General:   Well developed, well groomed. Head/Neck:   No jugular venous distention     No carotid bruits. No evidence of xanthelasma. Lungs:   No respiratory distress. Clear bilaterally. Heart:    Irreg irreg. Normal S1/S2. Palpation of heart with normal point of maximum impulse. No significant murmurs, rubs or gallops. Abdomen:   Soft and nontender. No palpable abdominal mass or bruits. Extremities:   Intact peripheral pulses. No significant edema. Neurological:   Alert and oriented to person, place, time. No focal neurological deficit visually. Skin:   No obvious rash    Blood Pressure Metric:  Monitor recommended and adjustments stated if needed.

## 2019-08-29 NOTE — PROGRESS NOTES
Chanelle  presents today for   Chief Complaint   Patient presents with    New Patient     referred by Kira Borjas for a new electrophysiology       Chanelle Ny preferred language for health care discussion is english/other. Is someone accompanying this pt?no    Is the patient using any DME equipment during OV? no    Depression Screening:  3 most recent PHQ Screens 8/29/2019   Little interest or pleasure in doing things Not at all   Feeling down, depressed, irritable, or hopeless Not at all   Total Score PHQ 2 0       Learning Assessment:  Learning Assessment 8/29/2019   PRIMARY LEARNER Patient   HIGHEST LEVEL OF EDUCATION - PRIMARY LEARNER  GRADUATED HIGH SCHOOL OR GED   BARRIERS PRIMARY LEARNER NONE   CO-LEARNER CAREGIVER No   CO-LEARNER NAME na   PRIMARY LANGUAGE ENGLISH   LEARNER PREFERENCE PRIMARY READING   ANSWERED BY self   RELATIONSHIP SELF       Abuse Screening:  Abuse Screening Questionnaire 8/29/2019   Do you ever feel afraid of your partner? N   Are you in a relationship with someone who physically or mentally threatens you? N   Is it safe for you to go home? Y       Fall Risk  Fall Risk Assessment, last 12 mths 8/29/2019   Able to walk? Yes   Fall in past 12 months? No       Pt currently taking Anticoagulant therapy? yes    Coordination of Care:  1. Have you been to the ER, urgent care clinic since your last visit? Hospitalized since your last visit? no    2. Have you seen or consulted any other health care providers outside of the [address] [place] [address] since your last visit? Include any pap smears or colon screening. no

## 2019-09-23 ENCOUNTER — HOSPITAL ENCOUNTER (OUTPATIENT)
Dept: NON INVASIVE DIAGNOSTICS | Age: 69
Discharge: HOME | End: 2019-09-23
Attending: INTERNAL MEDICINE | Admitting: INTERNAL MEDICINE
Payer: MEDICARE

## 2019-09-23 ENCOUNTER — ANESTHESIA (OUTPATIENT)
Dept: CARDIAC CATH/INVASIVE PROCEDURES | Age: 69
End: 2019-09-23
Payer: MEDICARE

## 2019-09-23 ENCOUNTER — ANESTHESIA EVENT (OUTPATIENT)
Dept: CARDIAC CATH/INVASIVE PROCEDURES | Age: 69
End: 2019-09-23
Payer: MEDICARE

## 2019-09-23 VITALS
DIASTOLIC BLOOD PRESSURE: 54 MMHG | HEART RATE: 64 BPM | RESPIRATION RATE: 22 BRPM | SYSTOLIC BLOOD PRESSURE: 98 MMHG | OXYGEN SATURATION: 99 %

## 2019-09-23 VITALS
TEMPERATURE: 98 F | SYSTOLIC BLOOD PRESSURE: 110 MMHG | HEIGHT: 72 IN | RESPIRATION RATE: 14 BRPM | BODY MASS INDEX: 30.2 KG/M2 | DIASTOLIC BLOOD PRESSURE: 59 MMHG | WEIGHT: 223 LBS | HEART RATE: 61 BPM | OXYGEN SATURATION: 100 %

## 2019-09-23 DIAGNOSIS — I48.91 A-FIB (HCC): ICD-10-CM

## 2019-09-23 LAB
ANION GAP BLD CALC-SCNC: 16 MMOL/L (ref 10–20)
ATRIAL RATE: 60 BPM
BUN BLD-MCNC: 18 MG/DL (ref 7–18)
CA-I BLD-MCNC: 1.25 MMOL/L (ref 1.12–1.32)
CALCULATED P AXIS, ECG09: 59 DEGREES
CALCULATED R AXIS, ECG10: 2 DEGREES
CALCULATED T AXIS, ECG11: 25 DEGREES
CHLORIDE BLD-SCNC: 105 MMOL/L (ref 100–108)
CO2 BLD-SCNC: 26 MMOL/L (ref 19–24)
CREAT UR-MCNC: 1.9 MG/DL (ref 0.6–1.3)
DIAGNOSIS, 93000: NORMAL
GLUCOSE BLD STRIP.AUTO-MCNC: 105 MG/DL (ref 74–106)
HCT VFR BLD CALC: 42 % (ref 36–49)
HGB BLD-MCNC: 14.3 G/DL (ref 12–16)
P-R INTERVAL, ECG05: 206 MS
POTASSIUM BLD-SCNC: 4.2 MMOL/L (ref 3.5–5.5)
Q-T INTERVAL, ECG07: 406 MS
QRS DURATION, ECG06: 88 MS
QTC CALCULATION (BEZET), ECG08: 406 MS
SODIUM BLD-SCNC: 142 MMOL/L (ref 136–145)
VENTRICULAR RATE, ECG03: 60 BPM

## 2019-09-23 PROCEDURE — 74011250636 HC RX REV CODE- 250/636

## 2019-09-23 PROCEDURE — 96374 THER/PROPH/DIAG INJ IV PUSH: CPT

## 2019-09-23 PROCEDURE — 80047 BASIC METABLC PNL IONIZED CA: CPT

## 2019-09-23 PROCEDURE — 74011000250 HC RX REV CODE- 250: Performed by: INTERNAL MEDICINE

## 2019-09-23 PROCEDURE — 74011000258 HC RX REV CODE- 258

## 2019-09-23 PROCEDURE — 93005 ELECTROCARDIOGRAM TRACING: CPT

## 2019-09-23 PROCEDURE — 76060000031 HC ANESTHESIA FIRST 0.5 HR: Performed by: INTERNAL MEDICINE

## 2019-09-23 PROCEDURE — 92960 CARDIOVERSION ELECTRIC EXT: CPT | Performed by: INTERNAL MEDICINE

## 2019-09-23 RX ORDER — SODIUM CHLORIDE 9 MG/ML
INJECTION, SOLUTION INTRAVENOUS
Status: DISCONTINUED | OUTPATIENT
Start: 2019-09-23 | End: 2019-09-23 | Stop reason: HOSPADM

## 2019-09-23 RX ORDER — PROPOFOL 10 MG/ML
INJECTION, EMULSION INTRAVENOUS AS NEEDED
Status: DISCONTINUED | OUTPATIENT
Start: 2019-09-23 | End: 2019-09-23 | Stop reason: HOSPADM

## 2019-09-23 RX ORDER — SODIUM CHLORIDE 9 MG/ML
10 INJECTION INTRAMUSCULAR; INTRAVENOUS; SUBCUTANEOUS ONCE
Status: COMPLETED | OUTPATIENT
Start: 2019-09-23 | End: 2019-09-23

## 2019-09-23 RX ADMIN — SODIUM CHLORIDE 10 ML: 9 INJECTION INTRAMUSCULAR; INTRAVENOUS; SUBCUTANEOUS at 09:14

## 2019-09-23 RX ADMIN — PROPOFOL 40 MG: 10 INJECTION, EMULSION INTRAVENOUS at 09:52

## 2019-09-23 RX ADMIN — PROPOFOL 20 MG: 10 INJECTION, EMULSION INTRAVENOUS at 09:55

## 2019-09-23 RX ADMIN — PROPOFOL 60 MG: 10 INJECTION, EMULSION INTRAVENOUS at 09:48

## 2019-09-23 RX ADMIN — SODIUM CHLORIDE: 9 INJECTION, SOLUTION INTRAVENOUS at 09:47

## 2019-09-23 NOTE — H&P
Plan KEEGAN and possible cardioversion.     HPI: A 72-year old male referred by Dr. Rj Velazquez for evaluation for atrial fibrillation and possible management options. He was talking to his primary care physician Dr. Brady Eldridge as well. He has a history of atrial fibrillation diagnosed at least a few years ago around the time he had stenting to his left circumflex. At that time he presented with symptoms of dyspnea. He has had no recurrent dyspnea or chest pain. He denies syncope, PND, edema. He does have some possible fatigue at times with dyspnea but overall he is doing quite well. He is here to discuss options. He is taking Xarelto without bleeding issues.     Impression/Plan:  1. History of persistent atrial fibrillation with minimal symptoms, rare palpitations and dyspnea, but tolerating relatively well at least for the past few years. 2. Chronic Xarelto use. 3. Chronic metoprolol and Cardizem use. 4. History of coronary artery disease with stenting of the circumflex a few years ago, stable. 5. Echocardiogram recently with normal left ventricular function but moderate left atrial enlargement. 6. Dyslipidemia on statin.      At this point I had a lengthy discussion about options which included possible rate control vs antiarrhythmic vs KEEGAN and a trial of cardioversion or even ablation. Given the fact that he has been in atrial fibrillation for a period of time I am unsure if I can even convert him. Therefore, I recommended a KEEGAN and a trial of cardioversion and will make a final decision.      If he is easily converted and his atrium is not severely dilated, I would likely schedule him for an atrial fibrillation ablation with sotalol loading. If he has massively dilated left atrium and I cannot convert him, it may be reasonable to consider amiodarone.  All questions answered.      Total care time spent in reviewing the case, multiple EMR databases, physician notes, procedure notes, examining the patient, and documentation, is 60 minutes.      Discussed in details with patient. All questions were answered to their full satisfaction. Risk, benefit and alternatives were discussed.  More than 50% time spent in counseling and coordination of care.          Past Medical History:   Diagnosis Date    Cancer ([facility] [address]       basal cell    Cholestanol storage disease      GERD (gastroesophageal reflux disease)       well controlled with meds    Hypercholesteremia      Hypertension 20007     5yrs    Kidney stones      Nausea & vomiting      Pneumonia                       Current Outpatient Medications   Medication Sig Dispense Refill    metoprolol tartrate (LOPRESSOR) 50 mg tablet Take 1 Tab by mouth two (2) times a day. 60 Tab 0    rivaroxaban (XARELTO) 20 mg tab tablet Take 1 Tab by mouth daily (with dinner). 30 Tab 0    spironolactone (ALDACTONE) 25 mg tablet Take 1 Tab by mouth daily. 30 Tab 0    dilTIAZem ER (CARDIZEM LA) 180 mg Tb24 tablet Take 180 mg by mouth daily.        omeprazole (PRILOSEC) 40 mg capsule Take 40 mg by mouth daily.        Cetirizine (ZYRTEC) 10 mg cap Take 10 mg by mouth.        aspirin 81 mg chewable tablet Take 1 Tab by mouth daily. 90 Tab 1    rosuvastatin (CRESTOR) 20 mg tablet Take 20 mg by mouth nightly.        irbesartan (AVAPRO) 150 mg tablet Take 1 tablet by mouth daily. 20 tablet 0         Social History   reports that he has never smoked. He has never used smokeless tobacco.   reports that he does not drink alcohol.     Family History  family history is not on file.     Review of Systems  Except as stated above include:  Constitutional: Negative for fever, chills and malaise/fatigue. HEENT: No congestion or recent URI. Gastrointestinal: No nausea, vomiting, abdominal pain, bloody stools. Pulmonary:  Negative except as stated above. Cardiac:  Negative except as stated above. Musculoskeletal: Negative except as stated above. Neurological:  No localized symptoms.   Skin:  Negative except as stated above. Psych:  Negative except as stated above. Endocrine:  Negative except as stated above.     PHYSICAL EXAM      BP Readings from Last 3 Encounters:   08/29/19 120/72   11/28/17 (!) 135/91   12/04/15 143/69            Pulse Readings from Last 3 Encounters:   08/29/19 72   11/28/17 90   12/04/15 60            Wt Readings from Last 3 Encounters:   08/29/19 101.2 kg (223 lb)   11/28/17 101.8 kg (224 lb 6.4 oz)   12/04/15 100.5 kg (221 lb 8 oz)      General:          Well developed, well groomed.    Head/Neck:     No jugular venous distention                           No carotid bruits.                         No evidence of xanthelasma. Lungs:             No respiratory distress.                            Clear bilaterally. Heart:                          Irreg irreg.  Normal S1/S2.                            Palpation of heart with normal point of maximum impulse.                          No significant murmurs, rubs or gallops. Abdomen:        Soft and nontender.                            No palpable abdominal mass or bruits. Extremities:     Intact peripheral pulses.                            No significant edema.    Neurological:   Alert and oriented to person, place, time.                            No focal neurological deficit visually.   Skin:                No obvious rash     Blood Pressure Metric:  Monitor recommended and adjustments stated if needed.         Electronically signed by Oli Hein MD at 08/29/19 9970

## 2019-09-23 NOTE — ANESTHESIA POSTPROCEDURE EVALUATION
Procedure(s):  EP CARDIOVERSION/KEEGAN prior. general    Anesthesia Post Evaluation      Multimodal analgesia: multimodal analgesia used between 6 hours prior to anesthesia start to PACU discharge  Patient location during evaluation: PACU  Patient participation: complete - patient participated  Level of consciousness: awake and alert  Pain management: adequate  Airway patency: patent  Anesthetic complications: no  Cardiovascular status: acceptable and hemodynamically stable  Respiratory status: acceptable and room air  Hydration status: acceptable  Post anesthesia nausea and vomiting:  controlled      No vitals data found for the desired time range.

## 2019-09-23 NOTE — ANESTHESIA PREPROCEDURE EVALUATION
Relevant Problems   No relevant active problems       Anesthetic History     PONV          Review of Systems / Medical History  Patient summary reviewed, nursing notes reviewed and pertinent labs reviewed    Pulmonary  Within defined limits                 Neuro/Psych   Within defined limits           Cardiovascular    Hypertension        Dysrhythmias : atrial fibrillation  CAD, cardiac stents and hyperlipidemia         GI/Hepatic/Renal     GERD    Renal disease: CRI       Endo/Other             Other Findings            Physical Exam    Airway  Mallampati: III  TM Distance: 4 - 6 cm  Neck ROM: normal range of motion   Mouth opening: Normal     Cardiovascular    Rhythm: irregular           Dental  No notable dental hx       Pulmonary                 Abdominal         Other Findings            Anesthetic Plan    ASA: 3  Anesthesia type: general            Anesthetic plan and risks discussed with: Patient

## 2019-09-23 NOTE — PROCEDURES
Procedures:  1. Synchronized external cardioversion x 1.  2.  Deep sedation with propofol and anesthesia assistance. 3.  Transesophageal echocardiogram prior to procedure without evidence of left atrial appendage thrombus. Details:  After informed consent was obtained the patient was adequately sedated. Transesophageal echocardiogram prior to procedure was without evidence of left atrial appendage thrombus. Initial rhythm was atrial fibrillation at 50-60 bpm.  A 200J external synchronized cardioversion was performed with restoration of sinus rhythm at 65 bpm.  The patient awoke without complication. Plan:  -Discharge home later today. No driving for 24 hours. Instructed to continue all meds, including Xarelto today.  -Follow-up with Dr. Thurston Needs in 4 weeks. -KEEGAN shows moderate/severe left atrial enlargement. I discussed with patient and family if he feels better in SR, then I would proceed to A.fib ablation and admission for sotalol loading. If he notices no change in symptoms after converting to SR, I would not aggressively pursue rhythm control given risk/benefit ratio. -I would also consider MEHRDAD evaluation as he obstructed quite easily during sedation. Thank you kindly for allowing me to participate in this patient's care. Please do not hesitate to contact me if any questions.

## 2019-09-23 NOTE — PROGRESS NOTES
09/23/19 0809   Vitals   Temp 98 °F (36.7 °C)   Temp Source Oral   Pulse (Heart Rate) 80   Heart Rate Source Monitor   Resp Rate 18   O2 Sat (%) 98 %   Level of Consciousness Alert   /67   MAP (Monitor) 83   BP 1 Location Left arm   BP 1 Method Automatic   BP Patient Position At rest   MEWS Score 1   Pt AXO 4, brought to [facility] ambulatory. Pt placed in bed 19 and connected to monitor. Baseline VS taken and PIV started x 1. Admission database completed. Pt ready for ordered procedure.

## 2019-09-23 NOTE — DISCHARGE INSTRUCTIONS
Patient Education      No driving x 24 hours  Transesophageal Echocardiogram: What to Expect at [address] [facility] ER  A transesophageal echocardiogram is a test to help your doctor look at the inside of your heart. A small device called a transducer directs sound waves toward your heart. The sound waves make a picture of the heart's valves and chambers. Before the test, your throat was sprayed with medicine to numb it. Your throat may be sore for a few days. You may have had a sedative to help you relax. You may be unsteady after having sedation. It can take a few hours for the medicine's effects to wear off. Common side effects include nausea, vomiting, and feeling sleepy or tired. This care sheet gives you a general idea about how long it will take for you to recover. But each person recovers at a different pace. Follow the steps below to feel better as quickly as possible. How can you care for yourself at home? Activity    · If a sedative was used, your doctor will tell you when it is safe for you to do your normal activities.     · For your safety, do not drive or operate any machinery that could be dangerous. Wait until the medicine wears off and you can think clearly and react easily. Diet    · Do not eat or drink until the numbness in your throat wears off.     · When the numbness is gone, you can eat your normal diet. Follow-up care is a key part of your treatment and safety. Be sure to make and go to all appointments, and call your doctor if you are having problems. It's also a good idea to know your test results and keep a list of the medicines you take. When should you call for help? Call 911 anytime you think you may need emergency care.  For example, call if:    · Your stools are maroon or very bloody.     · You vomit blood or what looks like coffee grounds.     Call your doctor now or seek immediate medical care if:    · You have pain in your chest, belly, or back.     · You have new or worse trouble swallowing.     · You have trouble breathing.    Watch closely for changes in your health, and be sure to contact your doctor if you have any problems. Where can you learn more? Go to http://john-evaristo.info/. Enter J264 in the search box to learn more about \"Transesophageal Echocardiogram: What to Expect at Home. \"  Current as of: April 9, 2019  Content Version: 12.2  © 5896-9988 [employer]. Care instructions adapted under license by [employer] (which disclaims liability or warranty for this information). If you have questions about a medical condition or this instruction, always ask your healthcare professional. [address] any warranty or liability for your use of this information. Patient Education        Learning About Cardioversion  What is cardioversion? Cardioversion helps your heart return to a normal rhythm. It treats problems like atrial fibrillation. It is also sometimes used in emergencies. It can correct a fast heartbeat that causes low blood pressure, chest pain, or heart failure. There are two types:  · The electrical type uses an electric current. The current enters your body through patches on your chest or back. · The chemical type uses medicines. The medicine is usually put into your arm through a tube called an IV. How is cardioversion done? Your doctor may ask you to take medicines before the treatment. These help prevent blood clots. Your doctor will watch you closely to make sure that there are no problems. Electrical cardioversion  The electrical procedure is done in a hospital. You will get medicine to help you relax and control the pain. Your doctor will put patches on your chest or back. The patches send an electric current to your heart. This resets your heart rhythm. The electrical part takes about 5 minutes. But you will probably be in the hospital for 1 to 2 hours.  You will need to recover from the effects of the sedative medicine. Chemical cardioversion  The chemical procedure is most often done in a hospital. In most cases, the medicine is put into your arm through a tube called an IV. But you may get medicines to take by mouth. You may feel a quick sting or pinch when the IV starts. The procedure usually takes about 4 to 8 hours. What can you expect after cardioversion? · You can usually go home the same day. You will need someone to drive you home. · Your doctor may have you take medicines daily. These help your heart beat normally and prevent blood clots. · After electrical cardioversion, you may have redness where the patches were. This looks and feels like a sunburn. · Abnormal heart rhythms sometimes come back after cardioversion. Follow-up care is a key part of your treatment and safety. Be sure to make and go to all appointments, and call your doctor if you are having problems. It's also a good idea to know your test results and keep a list of the medicines you take. Where can you learn more? Go to http://john-evaristo.info/. Enter Y828 in the search box to learn more about \"Learning About Cardioversion. \"  Current as of: April 9, 2019  Content Version: 12.2  © 8150-6251 [employer]. Care instructions adapted under license by [employer] (which disclaims liability or warranty for this information). If you have questions about a medical condition or this instruction, always ask your healthcare professional. [place] [address] any warranty or liability for your use of this information.          DISCHARGE SUMMARY from Nurse    PATIENT INSTRUCTIONS:    After general anesthesia or intravenous sedation, for 24 hours or while taking prescription Narcotics:  · Limit your activities  · Do not drive and operate hazardous machinery  · Do not make important personal or business decisions  · Do  not drink alcoholic beverages  · If you have not urinated within 8 hours after discharge, please contact your surgeon on call. Report the following to your surgeon:  · Excessive pain, swelling, redness or odor of or around the surgical area  · Temperature over 100.5  · Nausea and vomiting lasting longer than 4 hours or if unable to take medications  · Any signs of decreased circulation or nerve impairment to extremity: change in color, persistent  numbness, tingling, coldness or increase pain  · Any questions    What to do at Home:  Recommended activity: Activity as tolerated and no driving for today. *  Please give a list of your current medications to your Primary Care Provider. *  Please update this list whenever your medications are discontinued, doses are      changed, or new medications (including over-the-counter products) are added. *  Please carry medication information at all times in case of emergency situations. These are general instructions for a healthy lifestyle:    No smoking/ No tobacco products/ Avoid exposure to second hand smoke  Surgeon General's Warning:  Quitting smoking now greatly reduces serious risk to your health. Obesity, smoking, and sedentary lifestyle greatly increases your risk for illness    A healthy diet, regular physical exercise & weight monitoring are important for maintaining a healthy lifestyle    You may be retaining fluid if you have a history of heart failure or if you experience any of the following symptoms:  Weight gain of 3 pounds or more overnight or 5 pounds in a week, increased swelling in our hands or feet or shortness of breath while lying flat in bed. Please call your doctor as soon as you notice any of these symptoms; do not wait until your next office visit. The discharge information has been reviewed with the patient. The patient verbalized understanding.   Discharge medications reviewed with the patient and appropriate educational materials and side effects teaching were provided.   ___________________________________________________________________________________________________________________________________

## 2019-10-24 ENCOUNTER — OFFICE VISIT (OUTPATIENT)
Dept: CARDIOLOGY CLINIC | Age: 69
End: 2019-10-24

## 2019-10-24 VITALS
BODY MASS INDEX: 30.2 KG/M2 | WEIGHT: 223 LBS | DIASTOLIC BLOOD PRESSURE: 64 MMHG | OXYGEN SATURATION: 97 % | HEIGHT: 72 IN | HEART RATE: 49 BPM | SYSTOLIC BLOOD PRESSURE: 120 MMHG

## 2019-10-24 DIAGNOSIS — Z98.890 HISTORY OF CARDIOVERSION: Primary | ICD-10-CM

## 2019-10-24 DIAGNOSIS — I48.91 ATRIAL FIBRILLATION, UNSPECIFIED TYPE (HCC): ICD-10-CM

## 2019-10-24 NOTE — PROGRESS NOTES
HPI: A 70-year old male here for follow up. He was initially referred by Dr. Campos Trinidad for evaluation for atrial fibrillation management options. He had atrial fibrillation for two to three years prior to that around the time he had his circumflex stented. He had a trial of cardioversion 9/23/19 with KEEGAN as well. He was converted successfully and does not feel any different. He is here with his family today. I really tried to press him to see if he does feel better with regard to fatigue, sleeping, chest pain or palpations. Again, he says he is feeling fine and there has been no change. He is taking Xarelto without bleeding issues. Impression/Plan:  1. History of persistent atrial fibrillation with minimal symptoms status post cardioversion 9/23/19 with no change in symptoms. 2. Chronic Xarelto use. 3. Chronic metoprolol and Cardizem use. 4. History of coronary artery disease with stenting of left circumflex a few years ago, stable. 5. KEEGAN September 2019 with normal EF, mild to moderate left atrial dilatation. 6. Dyslipidemia on statin. I had a lengthy discussion with both the patient and his family in the office today. I did offer ablation but since he had no change in symptoms, it is hard to justify the procedure at this point. We will therefore continue with rate control only. His metoprolol and Cardizem can be adjusted as needed. If the argument is made to do the ablation for stopping blood thinners, I would not do it for this reason as he has a chance of recurrence without symptoms of stroke. I discussed the potential long term need for a Watchman device if he has bleeding issues or a pacemaker. All questions answered. He will follow up with Dr. Campos Trinidad. Thank you kindly for allowing me to participate in this patient's care. Please do not hesitate to contact me if any questions.     Past Medical History:   Diagnosis Date    Cancer ([facility] [address]     basal cell    Cholestanol storage disease     GERD (gastroesophageal reflux disease)     well controlled with meds    Hypercholesteremia     Hypertension 20007    5yrs    Kidney stones     Nausea & vomiting     Pneumonia        Current Outpatient Medications   Medication Sig Dispense Refill    metoprolol tartrate (LOPRESSOR) 50 mg tablet Take 1 Tab by mouth two (2) times a day. 60 Tab 0    rivaroxaban (XARELTO) 20 mg tab tablet Take 1 Tab by mouth daily (with dinner). 30 Tab 0    spironolactone (ALDACTONE) 25 mg tablet Take 1 Tab by mouth daily. 30 Tab 0    dilTIAZem ER (CARDIZEM LA) 180 mg Tb24 tablet Take 180 mg by mouth daily.  omeprazole (PRILOSEC) 40 mg capsule Take 40 mg by mouth daily.  Cetirizine (ZYRTEC) 10 mg cap Take 10 mg by mouth.  aspirin 81 mg chewable tablet Take 1 Tab by mouth daily. 90 Tab 1    rosuvastatin (CRESTOR) 20 mg tablet Take 20 mg by mouth nightly.  irbesartan (AVAPRO) 150 mg tablet Take 1 tablet by mouth daily. 20 tablet 0       Social History   reports that he has never smoked. He has never used smokeless tobacco.   reports that he does not drink alcohol. Family History  family history is not on file. Review of Systems  Except as stated above include:  Constitutional: Negative for fever, chills and malaise/fatigue. HEENT: No congestion or recent URI. Gastrointestinal: No nausea, vomiting, abdominal pain, bloody stools. Pulmonary:  Negative except as stated above. Cardiac:  Negative except as stated above. Musculoskeletal: Negative except as stated above. Neurological:  No localized symptoms. Skin:  Negative except as stated above. Psych:  Negative except as stated above. Endocrine:  Negative except as stated above.     PHYSICAL EXAM  BP Readings from Last 3 Encounters:   10/24/19 120/64   09/23/19 110/59   09/23/19 98/54     Pulse Readings from Last 3 Encounters:   10/24/19 (!) 49   09/23/19 61   09/23/19 64     Wt Readings from Last 3 Encounters:   10/24/19 101.2 kg (223 lb)   09/23/19 101.2 kg (223 lb)   08/29/19 101.2 kg (223 lb)     General:   Well developed, well groomed. Head/Neck:   No jugular venous distention     No carotid bruits. No evidence of xanthelasma. Lungs:   No respiratory distress. Clear bilaterally. Heart:    Gilbert Mould. Normal S1/S2. Palpation of heart with normal point of maximum impulse. No significant murmurs, rubs or gallops. Abdomen:   Soft and nontender. No palpable abdominal mass or bruits. Extremities:   Intact peripheral pulses. No significant edema. Neurological:   Alert and oriented to person, place, time. No focal neurological deficit visually. Skin:   No obvious rash    Blood Pressure Metric:  Monitor recommended and adjustments stated if needed.

## 2019-10-24 NOTE — PROGRESS NOTES
Oscar Snow presents today for   Chief Complaint   Patient presents with    Irregular Heart Beat     s/p cardioversion     Dizziness     sometimes        Oscar Snow preferred language for health care discussion is english/other. Is someone accompanying this pt? Wife and daughter     Is the patient using any DME equipment during [address]? no    Depression Screening:  3 most recent PHQ Screens 8/29/2019   Little interest or pleasure in doing things Not at all   Feeling down, depressed, irritable, or hopeless Not at all   Total Score PHQ 2 0       Learning Assessment:  Learning Assessment 8/29/2019   PRIMARY LEARNER Patient   HIGHEST LEVEL OF EDUCATION - PRIMARY LEARNER  GRADUATED HIGH SCHOOL OR GED   BARRIERS PRIMARY LEARNER NONE   CO-LEARNER CAREGIVER No   CO-LEARNER NAME na   PRIMARY LANGUAGE ENGLISH   LEARNER PREFERENCE PRIMARY READING   ANSWERED BY self   RELATIONSHIP SELF       Abuse Screening:  Abuse Screening Questionnaire 8/29/2019   Do you ever feel afraid of your partner? N   Are you in a relationship with someone who physically or mentally threatens you? N   Is it safe for you to go home? Y       Fall Risk  Fall Risk Assessment, last 12 mths 8/29/2019   Able to walk? Yes   Fall in past 12 months? No       Pt currently taking Anticoagulant therapy? Xarelto     Coordination of Care:  1. Have you been to the ER, urgent care clinic since your last visit? Hospitalized since your last visit? 9/23 for cardioversion     2. Have you seen or consulted any other health care providers outside of the [address] [place] [address] since your last visit? Include any pap smears or colon screening.  no

## 2021-02-23 ENCOUNTER — HOSPITAL ENCOUNTER (OUTPATIENT)
Dept: INFUSION THERAPY | Age: 71
Discharge: HOME | End: 2021-02-23
Payer: COMMERCIAL

## 2021-02-23 LAB
BASO+EOS+MONOS # BLD AUTO: 0.8 K/UL (ref 0–2.3)
BASO+EOS+MONOS NFR BLD AUTO: 13 % (ref 0.1–17)
DIFFERENTIAL METHOD BLD: ABNORMAL
ERYTHROCYTE [DISTWIDTH] IN BLOOD BY AUTOMATED COUNT: 12.5 % (ref 11.5–14.5)
HCT VFR BLD AUTO: 49.6 % (ref 36–48)
HGB BLD-MCNC: 16.6 G/DL (ref 12–16)
LYMPHOCYTES # BLD: 1.3 K/UL (ref 1.1–5.9)
LYMPHOCYTES NFR BLD: 21 % (ref 14–44)
MCH RBC QN AUTO: 30.9 PG (ref 25–35)
MCHC RBC AUTO-ENTMCNC: 33.5 G/DL (ref 31–37)
MCV RBC AUTO: 92.2 FL (ref 78–102)
NEUTS SEG # BLD: 4.2 K/UL (ref 1.8–9.5)
NEUTS SEG NFR BLD: 66 % (ref 40–70)
PLATELET # BLD AUTO: 185 K/UL (ref 140–440)
RBC # BLD AUTO: 5.38 M/UL (ref 4.1–5.1)
WBC # BLD AUTO: 6.3 K/UL (ref 4.5–13)

## 2021-02-23 PROCEDURE — 36415 COLL VENOUS BLD VENIPUNCTURE: CPT

## 2021-02-23 PROCEDURE — 85025 COMPLETE CBC W/AUTO DIFF WBC: CPT

## 2021-02-23 NOTE — PROGRESS NOTES
[facility] Progress Note    Date: 2021    Name: Claudio Flores    MRN: [id number]         : 1950      Mr. Flores arrived to \[facility]\"" at 1100 to see if he needs Therapeutic phlebotomy .  Educated patient the purpose for the treatment, potential side effects.     Mr. Flores was assessed and education was provided.     Mr. Flores's vitals were reviewed.  Visit Vitals  BP (!) (P) 142/88 (BP 1 Location: Left arm)   Pulse (P) 72   Temp (P) 97.3 °F (36.3 °C)   Resp (P) 18   SpO2 (P) 97%       Blood drawn for labs via Left hand right, condition patent and no redness venipuncture x1 attempt using a 18 G needle, brisk blood return verified. Collected cbc    Lab results were obtained and reviewed.  Recent Results (from the past 12 hour(s))   CBC WITH 3 PART DIFF    Collection Time: 21 11:15 AM   Result Value Ref Range    WBC 6.3 4.5 - 13.0 K/uL    RBC 5.38 (H) 4.10 - 5.10 M/uL    HGB 16.6 (HH) 12.0 - 16.0 g/dL    HCT 49.6 (H) 36 - 48 %    MCV 92.2 78 - 102 FL    MCH 30.9 25.0 - 35.0 PG    MCHC 33.5 31 - 37 g/dL    RDW 12.5 11.5 - 14.5 %    PLATELET 185 140 - 440 K/uL    NEUTROPHILS 66 40 - 70 %    MIXED CELLS 13 0.1 - 17 %    LYMPHOCYTES 21 14 - 44 %    ABS. NEUTROPHILS 4.2 1.8 - 9.5 K/UL    ABS. MIXED CELLS 0.8 0.0 - 2.3 K/uL    ABS. LYMPHOCYTES 1.3 1.1 - 5.9 K/UL    DF AUTOMATED         Hgb 16.6 and Hct 49.6. These results are normal for patients diagnosis, this is why he is being seen in the clinic today. However the labs do not support the need for therapeutic phlebotomy.  Explained to patient who verbalized understanding.        IV removed/ intact.  Gauze/ coban to site.    [facility] at 1135.  He is to return on 3/23/2021 at 1100 for his next appointment for labs and potential therapeutic phlebotomy     Lizette Storey RN  2021

## 2021-03-23 ENCOUNTER — HOSPITAL ENCOUNTER (OUTPATIENT)
Dept: INFUSION THERAPY | Age: 71
Discharge: HOME | End: 2021-03-23
Payer: COMMERCIAL

## 2021-03-23 VITALS
RESPIRATION RATE: 18 BRPM | DIASTOLIC BLOOD PRESSURE: 74 MMHG | OXYGEN SATURATION: 97 % | SYSTOLIC BLOOD PRESSURE: 135 MMHG | HEART RATE: 68 BPM | TEMPERATURE: 96.6 F

## 2021-03-23 LAB
BASO+EOS+MONOS # BLD AUTO: 0.8 K/UL (ref 0–2.3)
BASO+EOS+MONOS NFR BLD AUTO: 12 % (ref 0.1–17)
DIFFERENTIAL METHOD BLD: ABNORMAL
ERYTHROCYTE [DISTWIDTH] IN BLOOD BY AUTOMATED COUNT: 12.1 % (ref 11.5–14.5)
HCT VFR BLD AUTO: 50.3 % (ref 36–48)
HGB BLD-MCNC: 17.1 G/DL (ref 12–16)
LYMPHOCYTES # BLD: 1.7 K/UL (ref 1.1–5.9)
LYMPHOCYTES NFR BLD: 24 % (ref 14–44)
MCH RBC QN AUTO: 30.5 PG (ref 25–35)
MCHC RBC AUTO-ENTMCNC: 34 G/DL (ref 31–37)
MCV RBC AUTO: 89.7 FL (ref 78–102)
NEUTS SEG # BLD: 4.5 K/UL (ref 1.8–9.5)
NEUTS SEG NFR BLD: 64 % (ref 40–70)
PLATELET # BLD AUTO: 152 K/UL (ref 140–440)
RBC # BLD AUTO: 5.61 M/UL (ref 4.1–5.1)
WBC # BLD AUTO: 7 K/UL (ref 4.5–13)

## 2021-03-23 PROCEDURE — 74011250636 HC RX REV CODE- 250/636: Performed by: UROLOGY

## 2021-03-23 PROCEDURE — 85025 COMPLETE CBC W/AUTO DIFF WBC: CPT

## 2021-03-23 PROCEDURE — 99195 PHLEBOTOMY: CPT

## 2021-03-23 RX ORDER — SODIUM CHLORIDE 9 MG/ML
500 INJECTION, SOLUTION INTRAVENOUS CONTINUOUS
Status: DISCONTINUED | OUTPATIENT
Start: 2021-03-23 | End: 2021-03-24 | Stop reason: HOSPADM

## 2021-03-23 RX ADMIN — SODIUM CHLORIDE 500 ML: 0.9 INJECTION, SOLUTION INTRAVENOUS at 11:35

## 2021-03-23 NOTE — PROGRESS NOTES
[facility] Progress Note    Date: 2021    Name: Deborah Paz    MRN: [id number]         : 1950      Mr. Krystal Banks arrived to [facility] at 1100 for labs to determine if he will need therapeutic phlebotomy today. Mr. Krystal Banks was assessed and education was provided. Denies any questions or concerns pertaining to the treatment. Mr. Flores's vitals were reviewed. Visit Vitals  /74 (BP 1 Location: Left arm)   Pulse 68   Temp (!) 96.6 °F (35.9 °C)   Resp 18   SpO2 97%       Blood drawn for labs via Left hand right, condition patent and no redness venipuncture x1 attempt using a 18 gauge PIV needle, brisk blood return verified, secured with tegaderm. Lab results were obtained and reviewed. Recent Results (from the past 12 hour(s))   CBC WITH 3 PART DIFF    Collection Time: 21 11:10 AM   Result Value Ref Range    WBC 7.0 4.5 - 13.0 K/uL    RBC 5.61 (H) 4.10 - 5.10 M/uL    HGB 17.1 (HH) 12.0 - 16.0 g/dL    HCT 50.3 (HH) 36 - 48 %    MCV 89.7 78 - 102 FL    MCH 30.5 25.0 - 35.0 PG    MCHC 34.0 31 - 37 g/dL    RDW 12.1 11.5 - 14.5 %    PLATELET 960 971 - 969 K/uL    NEUTROPHILS 64 40 - 70 %    MIXED CELLS 12 0.1 - 17 %    LYMPHOCYTES 24 14 - 44 %    ABS. NEUTROPHILS 4.5 1.8 - 9.5 K/UL    ABS. MIXED CELLS 0.8 0.0 - 2.3 K/uL    ABS. LYMPHOCYTES 1.7 1.1 - 5.9 K/UL    DF AUTOMATED         Hgb 17.1 and Hct 50.3 ( these labs are expected for diagnosis, reason patient is being treated here )    Therapeutic phlebotomy initiated at 1110    Therapeutic phlebotomy ended at 1130 with approximately 550 ml of blood obtained followed by 500ml of NS administered as post-phlebotomy hydration per orders. Pt offered snack/ drink throughout his visit. Mr. Krystal Banks tolerated well without complaints. IV removed/ intact. Gauze/ coban to site. Pts vital signs remained stable through out treatment    Mr. Krystal Banks was discharged from [place] [address] in stable condition at 1215.   He is to return on 3/30/2021  at 1100  for his next appointment.     Ant Turk RN  March 23, 2021

## 2021-03-30 ENCOUNTER — HOSPITAL ENCOUNTER (OUTPATIENT)
Dept: INFUSION THERAPY | Age: 71
Discharge: HOME | End: 2021-03-30
Payer: COMMERCIAL

## 2021-03-30 VITALS
OXYGEN SATURATION: 98 % | SYSTOLIC BLOOD PRESSURE: 103 MMHG | RESPIRATION RATE: 18 BRPM | TEMPERATURE: 96.5 F | DIASTOLIC BLOOD PRESSURE: 69 MMHG

## 2021-03-30 LAB
BASO+EOS+MONOS # BLD AUTO: 0.7 K/UL (ref 0–2.3)
BASO+EOS+MONOS NFR BLD AUTO: 12 % (ref 0.1–17)
DIFFERENTIAL METHOD BLD: NORMAL
ERYTHROCYTE [DISTWIDTH] IN BLOOD BY AUTOMATED COUNT: 12.7 % (ref 11.5–14.5)
HCT VFR BLD AUTO: 42.9 % (ref 36–48)
HGB BLD-MCNC: 14.7 G/DL (ref 12–16)
LYMPHOCYTES # BLD: 1.3 K/UL (ref 1.1–5.9)
LYMPHOCYTES NFR BLD: 24 % (ref 14–44)
MCH RBC QN AUTO: 30.6 PG (ref 25–35)
MCHC RBC AUTO-ENTMCNC: 34.3 G/DL (ref 31–37)
MCV RBC AUTO: 89.4 FL (ref 78–102)
NEUTS SEG # BLD: 3.4 K/UL (ref 1.8–9.5)
NEUTS SEG NFR BLD: 64 % (ref 40–70)
PLATELET # BLD AUTO: 145 K/UL (ref 140–440)
RBC # BLD AUTO: 4.8 M/UL (ref 4.1–5.1)
WBC # BLD AUTO: 5.4 K/UL (ref 4.5–13)

## 2021-03-30 PROCEDURE — 85025 COMPLETE CBC W/AUTO DIFF WBC: CPT

## 2021-03-30 PROCEDURE — 36415 COLL VENOUS BLD VENIPUNCTURE: CPT

## 2021-03-30 NOTE — PROGRESS NOTES
[facility] Progress Note    Date: 2021    Name: Cde Lilyl    MRN: [id number]         : 1950      Mr. Reyes Ku arrived to [facility] at 1100 to see if he needs Therapeutic phlebotomy . Educated patient the purpose for the treatment, potential side effects. Mr. Reyes Ku was assessed and education was provided. Mr. Flores's vitals were reviewed. Visit Vitals  /69 (BP 1 Location: Left arm)   Temp (!) 96.5 °F (35.8 °C)   Resp 18   SpO2 98%       Blood drawn for labs via Left hand right, condition patent and no redness venipuncture x1 attempt using a 18 G needle, brisk blood return verified. Collected cbc    Lab results were obtained and reviewed. Recent Results (from the past 12 hour(s))   CBC WITH 3 PART DIFF    Collection Time: 21 11:15 AM   Result Value Ref Range    WBC 5.4 4.5 - 13.0 K/uL    RBC 4.80 4. 10 - 5.10 M/uL    HGB 14.7 12.0 - 16.0 g/dL    HCT 42.9 36 - 48 %    MCV 89.4 78 - 102 FL    MCH 30.6 25.0 - 35.0 PG    MCHC 34.3 31 - 37 g/dL    RDW 12.7 11.5 - 14.5 %    PLATELET 676 559 - 793 K/uL    NEUTROPHILS 64 40 - 70 %    MIXED CELLS 12 0.1 - 17 %    LYMPHOCYTES 24 14 - 44 %    ABS. NEUTROPHILS 3.4 1.8 - 9.5 K/UL    ABS. MIXED CELLS 0.7 0.0 - 2.3 K/uL    ABS. LYMPHOCYTES 1.3 1.1 - 5.9 K/UL    DF AUTOMATED         Hgb 14.7 and Hct 42.9  These results are normal for patients diagnosis, this is why he is being seen in the clinic today. However the labs do not support the need for therapeutic phlebotomy. Explained to patient who verbalized understanding. IV removed/ intact. Gauze/ coban to site. Mr. Reyes Ku was discharged from [place] [address] in stable condition at 1125.   He is to return on 2021 at 1100 for his next appointment for labs and potential therapeutic phlebotomy     Bailee Ricketts RN  2021

## 2021-04-07 ENCOUNTER — HOSPITAL ENCOUNTER (OUTPATIENT)
Age: 71
Setting detail: OBSERVATION
Discharge: HOME | End: 2021-04-09
Attending: EMERGENCY MEDICINE | Admitting: HOSPITALIST
Payer: MEDICARE

## 2021-04-07 ENCOUNTER — APPOINTMENT (OUTPATIENT)
Dept: CT IMAGING | Age: 71
End: 2021-04-07
Attending: EMERGENCY MEDICINE
Payer: MEDICARE

## 2021-04-07 ENCOUNTER — APPOINTMENT (OUTPATIENT)
Dept: GENERAL RADIOLOGY | Age: 71
End: 2021-04-07
Attending: EMERGENCY MEDICINE
Payer: MEDICARE

## 2021-04-07 DIAGNOSIS — I95.9 HYPOTENSION, UNSPECIFIED HYPOTENSION TYPE: ICD-10-CM

## 2021-04-07 DIAGNOSIS — R55 NEAR SYNCOPE: Primary | ICD-10-CM

## 2021-04-07 DIAGNOSIS — N17.9 AKI (ACUTE KIDNEY INJURY) (HCC): ICD-10-CM

## 2021-04-07 DIAGNOSIS — R00.1 BRADYCARDIA: ICD-10-CM

## 2021-04-07 PROBLEM — N18.30 ACUTE RENAL FAILURE SUPERIMPOSED ON STAGE 3 CHRONIC KIDNEY DISEASE (HCC): Status: ACTIVE | Noted: 2021-04-07

## 2021-04-07 LAB
ALBUMIN SERPL-MCNC: 3.9 G/DL (ref 3.4–5)
ALBUMIN/GLOB SERPL: 1.4 {RATIO} (ref 0.8–1.7)
ALP SERPL-CCNC: 72 U/L (ref 45–117)
ALT SERPL-CCNC: 20 U/L (ref 16–61)
AMPHET UR QL SCN: NEGATIVE
ANION GAP SERPL CALC-SCNC: 9 MMOL/L (ref 3–18)
APPEARANCE UR: CLEAR
APTT PPP: 34.8 SEC (ref 23–36.4)
AST SERPL-CCNC: 17 U/L (ref 10–38)
ATRIAL RATE: 326 BPM
BARBITURATES UR QL SCN: NEGATIVE
BASOPHILS # BLD: 0 K/UL (ref 0–0.1)
BASOPHILS # BLD: 0.1 K/UL (ref 0–0.1)
BASOPHILS NFR BLD: 0 % (ref 0–2)
BASOPHILS NFR BLD: 1 % (ref 0–2)
BENZODIAZ UR QL: NEGATIVE
BILIRUB SERPL-MCNC: 0.6 MG/DL (ref 0.2–1)
BILIRUB UR QL: NEGATIVE
BUN SERPL-MCNC: 24 MG/DL (ref 7–18)
BUN/CREAT SERPL: 11 (ref 12–20)
CALCIUM SERPL-MCNC: 9.5 MG/DL (ref 8.5–10.1)
CALCULATED R AXIS, ECG10: 22 DEGREES
CALCULATED T AXIS, ECG11: 21 DEGREES
CANNABINOIDS UR QL SCN: NEGATIVE
CHLORIDE SERPL-SCNC: 104 MMOL/L (ref 100–111)
CK MB CFR SERPL CALC: NORMAL % (ref 0–4)
CK MB CFR SERPL CALC: NORMAL % (ref 0–4)
CK MB SERPL-MCNC: <1 NG/ML (ref 5–25)
CK MB SERPL-MCNC: <1 NG/ML (ref 5–25)
CK SERPL-CCNC: 54 U/L (ref 39–308)
CK SERPL-CCNC: 63 U/L (ref 39–308)
CO2 SERPL-SCNC: 24 MMOL/L (ref 21–32)
COCAINE UR QL SCN: NEGATIVE
COLOR UR: YELLOW
CREAT SERPL-MCNC: 2.09 MG/DL (ref 0.6–1.3)
DIAGNOSIS, 93000: NORMAL
DIFFERENTIAL METHOD BLD: ABNORMAL
DIFFERENTIAL METHOD BLD: NORMAL
EOSINOPHIL # BLD: 0.2 K/UL (ref 0–0.4)
EOSINOPHIL # BLD: 0.4 K/UL (ref 0–0.4)
EOSINOPHIL NFR BLD: 3 % (ref 0–5)
EOSINOPHIL NFR BLD: 5 % (ref 0–5)
ERYTHROCYTE [DISTWIDTH] IN BLOOD BY AUTOMATED COUNT: 12.6 % (ref 11.6–14.5)
ERYTHROCYTE [DISTWIDTH] IN BLOOD BY AUTOMATED COUNT: 12.8 % (ref 11.6–14.5)
GLOBULIN SER CALC-MCNC: 2.8 G/DL (ref 2–4)
GLUCOSE SERPL-MCNC: 140 MG/DL (ref 74–99)
GLUCOSE UR STRIP.AUTO-MCNC: NEGATIVE MG/DL
HCT VFR BLD AUTO: 43.1 % (ref 36–48)
HCT VFR BLD AUTO: 47.7 % (ref 36–48)
HDSCOM,HDSCOM: NORMAL
HGB BLD-MCNC: 14.2 G/DL (ref 13–16)
HGB BLD-MCNC: 15.8 G/DL (ref 13–16)
HGB UR QL STRIP: NEGATIVE
KETONES UR QL STRIP.AUTO: NEGATIVE MG/DL
LEUKOCYTE ESTERASE UR QL STRIP.AUTO: NEGATIVE
LYMPHOCYTES # BLD: 1.4 K/UL (ref 0.9–3.6)
LYMPHOCYTES # BLD: 1.6 K/UL (ref 0.9–3.6)
LYMPHOCYTES NFR BLD: 20 % (ref 21–52)
LYMPHOCYTES NFR BLD: 22 % (ref 21–52)
MCH RBC QN AUTO: 30.2 PG (ref 24–34)
MCH RBC QN AUTO: 30.4 PG (ref 24–34)
MCHC RBC AUTO-ENTMCNC: 32.9 G/DL (ref 31–37)
MCHC RBC AUTO-ENTMCNC: 33.1 G/DL (ref 31–37)
MCV RBC AUTO: 91.2 FL (ref 74–97)
MCV RBC AUTO: 92.3 FL (ref 74–97)
METHADONE UR QL: NEGATIVE
MONOCYTES # BLD: 0.6 K/UL (ref 0.05–1.2)
MONOCYTES # BLD: 0.6 K/UL (ref 0.05–1.2)
MONOCYTES NFR BLD: 8 % (ref 3–10)
MONOCYTES NFR BLD: 9 % (ref 3–10)
NEUTS SEG # BLD: 4.6 K/UL (ref 1.8–8)
NEUTS SEG # BLD: 4.8 K/UL (ref 1.8–8)
NEUTS SEG NFR BLD: 64 % (ref 40–73)
NEUTS SEG NFR BLD: 67 % (ref 40–73)
NITRITE UR QL STRIP.AUTO: NEGATIVE
OPIATES UR QL: NEGATIVE
PCP UR QL: NEGATIVE
PH UR STRIP: 5 [PH] (ref 5–8)
PLATELET # BLD AUTO: 145 K/UL (ref 135–420)
PLATELET # BLD AUTO: 204 K/UL (ref 135–420)
PMV BLD AUTO: 11.4 FL (ref 9.2–11.8)
PMV BLD AUTO: 11.6 FL (ref 9.2–11.8)
POTASSIUM SERPL-SCNC: 4.5 MMOL/L (ref 3.5–5.5)
PROT SERPL-MCNC: 6.7 G/DL (ref 6.4–8.2)
PROT UR STRIP-MCNC: NEGATIVE MG/DL
Q-T INTERVAL, ECG07: 448 MS
QRS DURATION, ECG06: 92 MS
QTC CALCULATION (BEZET), ECG08: 439 MS
RBC # BLD AUTO: 4.67 M/UL (ref 4.35–5.65)
RBC # BLD AUTO: 5.23 M/UL (ref 4.35–5.65)
SODIUM SERPL-SCNC: 137 MMOL/L (ref 136–145)
SP GR UR REFRACTOMETRY: 1.01 (ref 1–1.03)
TROPONIN I SERPL-MCNC: 0.02 NG/ML (ref 0–0.04)
TROPONIN I SERPL-MCNC: 0.03 NG/ML (ref 0–0.04)
TSH SERPL DL<=0.05 MIU/L-ACNC: 2.74 UIU/ML (ref 0.36–3.74)
UROBILINOGEN UR QL STRIP.AUTO: 0.2 EU/DL (ref 0.2–1)
VENTRICULAR RATE, ECG03: 58 BPM
WBC # BLD AUTO: 7.1 K/UL (ref 4.6–13.2)
WBC # BLD AUTO: 7.3 K/UL (ref 4.6–13.2)

## 2021-04-07 PROCEDURE — 36415 COLL VENOUS BLD VENIPUNCTURE: CPT

## 2021-04-07 PROCEDURE — 81003 URINALYSIS AUTO W/O SCOPE: CPT

## 2021-04-07 PROCEDURE — 74011250637 HC RX REV CODE- 250/637: Performed by: HOSPITALIST

## 2021-04-07 PROCEDURE — 70450 CT HEAD/BRAIN W/O DYE: CPT

## 2021-04-07 PROCEDURE — 94762 N-INVAS EAR/PLS OXIMTRY CONT: CPT

## 2021-04-07 PROCEDURE — 84443 ASSAY THYROID STIM HORMONE: CPT

## 2021-04-07 PROCEDURE — 80307 DRUG TEST PRSMV CHEM ANLYZR: CPT

## 2021-04-07 PROCEDURE — 74011250636 HC RX REV CODE- 250/636: Performed by: EMERGENCY MEDICINE

## 2021-04-07 PROCEDURE — 93005 ELECTROCARDIOGRAM TRACING: CPT

## 2021-04-07 PROCEDURE — 80053 COMPREHEN METABOLIC PANEL: CPT

## 2021-04-07 PROCEDURE — 85025 COMPLETE CBC W/AUTO DIFF WBC: CPT

## 2021-04-07 PROCEDURE — 74011250636 HC RX REV CODE- 250/636: Performed by: HOSPITALIST

## 2021-04-07 PROCEDURE — 96366 THER/PROPH/DIAG IV INF ADDON: CPT

## 2021-04-07 PROCEDURE — 85730 THROMBOPLASTIN TIME PARTIAL: CPT

## 2021-04-07 PROCEDURE — 82553 CREATINE MB FRACTION: CPT

## 2021-04-07 PROCEDURE — 99218 HC RM OBSERVATION: CPT

## 2021-04-07 PROCEDURE — 96365 THER/PROPH/DIAG IV INF INIT: CPT

## 2021-04-07 PROCEDURE — 65660000000 HC RM CCU STEPDOWN

## 2021-04-07 PROCEDURE — 71045 X-RAY EXAM CHEST 1 VIEW: CPT

## 2021-04-07 PROCEDURE — 99285 EMERGENCY DEPT VISIT HI MDM: CPT

## 2021-04-07 RX ORDER — HEPARIN SODIUM 10000 [USP'U]/100ML
18-36 INJECTION, SOLUTION INTRAVENOUS
Status: DISCONTINUED | OUTPATIENT
Start: 2021-04-07 | End: 2021-04-08

## 2021-04-07 RX ORDER — PANTOPRAZOLE SODIUM 40 MG/1
40 TABLET, DELAYED RELEASE ORAL
Status: DISCONTINUED | OUTPATIENT
Start: 2021-04-08 | End: 2021-04-09 | Stop reason: HOSPADM

## 2021-04-07 RX ORDER — PROMETHAZINE HYDROCHLORIDE 25 MG/1
12.5 TABLET ORAL
Status: DISCONTINUED | OUTPATIENT
Start: 2021-04-07 | End: 2021-04-09 | Stop reason: HOSPADM

## 2021-04-07 RX ORDER — ACETAMINOPHEN 650 MG/1
650 SUPPOSITORY RECTAL
Status: DISCONTINUED | OUTPATIENT
Start: 2021-04-07 | End: 2021-04-09 | Stop reason: HOSPADM

## 2021-04-07 RX ORDER — SODIUM CHLORIDE 0.9 % (FLUSH) 0.9 %
5-40 SYRINGE (ML) INJECTION AS NEEDED
Status: DISCONTINUED | OUTPATIENT
Start: 2021-04-07 | End: 2021-04-09 | Stop reason: HOSPADM

## 2021-04-07 RX ORDER — SODIUM CHLORIDE 9 MG/ML
50 INJECTION, SOLUTION INTRAVENOUS CONTINUOUS
Status: DISPENSED | OUTPATIENT
Start: 2021-04-07 | End: 2021-04-08

## 2021-04-07 RX ORDER — ROSUVASTATIN CALCIUM 10 MG/1
20 TABLET, COATED ORAL
Status: DISCONTINUED | OUTPATIENT
Start: 2021-04-07 | End: 2021-04-09 | Stop reason: HOSPADM

## 2021-04-07 RX ORDER — ACETAMINOPHEN 325 MG/1
650 TABLET ORAL
Status: DISCONTINUED | OUTPATIENT
Start: 2021-04-07 | End: 2021-04-09 | Stop reason: HOSPADM

## 2021-04-07 RX ORDER — ONDANSETRON 2 MG/ML
4 INJECTION INTRAMUSCULAR; INTRAVENOUS
Status: DISCONTINUED | OUTPATIENT
Start: 2021-04-07 | End: 2021-04-09 | Stop reason: HOSPADM

## 2021-04-07 RX ORDER — LORATADINE 10 MG/1
10 TABLET ORAL DAILY
Status: DISCONTINUED | OUTPATIENT
Start: 2021-04-08 | End: 2021-04-09 | Stop reason: HOSPADM

## 2021-04-07 RX ORDER — HEPARIN SODIUM 1000 [USP'U]/ML
80 INJECTION, SOLUTION INTRAVENOUS; SUBCUTANEOUS ONCE
Status: COMPLETED | OUTPATIENT
Start: 2021-04-07 | End: 2021-04-07

## 2021-04-07 RX ORDER — SODIUM CHLORIDE 0.9 % (FLUSH) 0.9 %
5-40 SYRINGE (ML) INJECTION EVERY 8 HOURS
Status: DISCONTINUED | OUTPATIENT
Start: 2021-04-07 | End: 2021-04-09 | Stop reason: HOSPADM

## 2021-04-07 RX ORDER — FACIAL-BODY WIPES
10 EACH TOPICAL DAILY PRN
Status: DISCONTINUED | OUTPATIENT
Start: 2021-04-07 | End: 2021-04-09 | Stop reason: HOSPADM

## 2021-04-07 RX ADMIN — Medication 10 ML: at 21:40

## 2021-04-07 RX ADMIN — SODIUM CHLORIDE 50 ML/HR: 900 INJECTION, SOLUTION INTRAVENOUS at 16:37

## 2021-04-07 RX ADMIN — ROSUVASTATIN CALCIUM 20 MG: 10 TABLET, COATED ORAL at 21:40

## 2021-04-07 RX ADMIN — Medication 10 ML: at 14:54

## 2021-04-07 RX ADMIN — SODIUM CHLORIDE 1000 ML: 900 INJECTION, SOLUTION INTRAVENOUS at 14:56

## 2021-04-07 RX ADMIN — HEPARIN SODIUM 7980 UNITS: 1000 INJECTION INTRAVENOUS; SUBCUTANEOUS at 21:03

## 2021-04-07 RX ADMIN — SODIUM CHLORIDE 1000 ML: 900 INJECTION, SOLUTION INTRAVENOUS at 12:24

## 2021-04-07 RX ADMIN — SODIUM CHLORIDE 1000 ML: 900 INJECTION, SOLUTION INTRAVENOUS at 12:32

## 2021-04-07 RX ADMIN — HEPARIN SODIUM 18 UNITS/KG/HR: 10000 INJECTION, SOLUTION INTRAVENOUS at 21:08

## 2021-04-07 NOTE — ED NOTES
TRANSFER - OUT REPORT:    Verbal report given to [employer] on Brian  being transferred to [facility](unit) for routine progression of care       Report consisted of patients Situation, Background, Assessment and   Recommendations(SBAR). Information from the following report(s) SBAR, ED Summary, [facility] and Cardiac Rhythm Afib was reviewed with the receiving nurse. Lines:   Peripheral IV 04/07/21 Right Antecubital (Active)   Site Assessment Clean, dry, & intact 04/07/21 1208   Phlebitis Assessment 0 04/07/21 1208   Infiltration Assessment 0 04/07/21 1208   Dressing Status Clean, dry, & intact 04/07/21 1208   Hub Color/Line Status Green 04/07/21 1208   Action Taken Blood drawn 04/07/21 1208   Alcohol Cap Used No 04/07/21 1208        Opportunity for questions and clarification was provided.       Patient transported with:   Registered Nurse

## 2021-04-07 NOTE — H&P
History & Physical    Patient: Gabriel Raymond MRN: [id number]  CSN: [id number]    YOB: 1950  Age: 79 y.o. Sex: male      DOA: 4/7/2021  Primary Care Provider:  Christina Hernandez MD      Assessment/Plan     Hospital Problems  Date Reviewed: 10/24/2019          Codes Class Noted POA    Bradycardia ICD-10-CM: R00.1  ICD-9-CM: 427.89  4/7/2021 Unknown        Hypotension ICD-10-CM: I95.9  ICD-9-CM: 458.9  4/7/2021 Unknown        Acute renal failure superimposed on stage 3 chronic kidney disease ([facility] ICD-10-CM: N17.9, N18.30  ICD-9-CM: 584.9, 585.3  4/7/2021         Atrial fibrillation ([facility] ICD-10-CM: I48.91  ICD-9-CM: 427.31  11/24/2017 Yes        Near syncope ICD-10-CM: R55  ICD-9-CM: 780.2  5/6/2013 Unknown              Admit to telemetry    Near syncope  Likely due to hypotension and andrew -received normal saline bolus in ER  Fall precaution  Neuro check, CT head no acute process  Will have carotid ultrasound  Check orthostatics BP      Hypotension  Hold antihypertension medication  IV hydration  We will get a UA rule out infection  Chest x-ray is ok     afib -andrew   Hold bbb and ca-blocker due to bp and anderw   Will start heparin gtt   Taking xarelto at home   Dr. Etta Copeland is consulted     sebastien on ckd3   Low rate IV hydration. Full code     Estimate  length of stay : 2-3 day    DVT : heparin ppi    CC: dizziness        HPI:     Gabriel Raymond is a 79 y.o. male with hypertension, CKD, A. Fib presented to ER due to dizziness. .he had been working in the garden, patient presented dizziness, near syncope episode will change position. He denies any chest pain/spob/palpitation before this happened. He was found SBP 81 on arrival of  ER. 2 L normal saline bolus was administrated. BP got improving. EKG indicated A. fib with heart rate 58. His creatinine is 2.09 his baseline was 1.5 in 2017. Cardiologist was consulted. Dr. Etta Copeland recommended hold Xarelto start heparin drip tonight.      Denies any slurred speech/headache/cp/n/v/blurred vission/d/c/palpitation/gait change/bleeding. Denies smoking/ any alcohol or drug use. No hx of covid 19 contact.    Visit Vitals  /67   Pulse 62   Temp 98.4 °F (36.9 °C)   Resp 18   Ht 6' (1.829 m)   Wt 99.8 kg (220 lb)   SpO2 100%   BMI 29.84 kg/m²      O2 Device: Room air      Past Medical History:   Diagnosis Date    Cancer ([address]     basal cell    Cholestanol storage disease     GERD (gastroesophageal reflux disease)     well controlled with meds    Hypercholesteremia     Hypertension 13961    5yrs    Kidney stones     Nausea & vomiting     Pneumonia        Past Surgical History:   Procedure Laterality Date    CORONARY STENT EA ADDL VESSEL  12/4/2015    CORONARY STENT SINGLE W/PTCA  12/4/2015    HX ADENOIDECTOMY      HX APPENDECTOMY      HX CERVICAL FUSION  05/02/2012    HX HEENT  2013    sinus surgery    HX OTHER SURGICAL      basal cell removal from forehead    HX OTHER SURGICAL  2012    varicose vein surgery    HX TONSILLECTOMY      HX UROLOGICAL  4-13    lithotripsy    LEFT HEART PERCUTANEOUS  12/4/2015    VT CARDIAC SURG PROCEDURE UNLIST      VT CARDIOVERSION ELECTIVE ARRHYTHMIA EXTERNAL N/A 9/23/2019    EP CARDIOVERSION/KEEGAN prior performed by Arti Leonard MD at [facility] CATH LAB    RT & LT HEART WITH C.O.  12/3/2017    BRYAN CORONARY ARTERIOGRAPH  12/4/2015    BRYAN CORONARY ARTERIOGRAPH  12/3/2017       Family History   Problem Relation Age of Onset    Malignant Hyperthermia Neg Hx     Pseudocholinesterase Deficiency Neg Hx     Delayed Awakening Neg Hx     Post-op Nausea/Vomiting Neg Hx     Emergence Delirium Neg Hx        Social History     Socioeconomic History    Marital status:      Spouse name: Not on file    Number of children: Not on file    Years of education: Not on file    Highest education level: Not on file   Tobacco Use    Smoking status: Never Smoker    Smokeless tobacco: Never Used   Substance and Sexual Activity    Alcohol use: No    Drug use: No       Prior to Admission medications    Medication Sig Start Date End Date Taking? Authorizing Provider   metoprolol tartrate (LOPRESSOR) 50 mg tablet Take 1 Tab by mouth two (2) times a day. 11/28/17   Erlinda Nj PA-C   rivaroxaban (XARELTO) 20 mg tab tablet Take 1 Tab by mouth daily (with dinner). 11/28/17   Erlinda Nj PA-C   spironolactone (ALDACTONE) 25 mg tablet Take 1 Tab by mouth daily. 11/29/17   Erlinda Nj PA-C   dilTIAZem ER (CARDIZEM LA) 180 mg Tb24 tablet Take 180 mg by mouth daily. OtherGela MD   omeprazole (PRILOSEC) 40 mg capsule Take 40 mg by mouth daily. Gela Tinajero MD   Cetirizine (ZYRTEC) 10 mg cap Take 10 mg by mouth. Gela Tinajero MD   aspirin 81 mg chewable tablet Take 1 Tab by mouth daily. 12/3/15   Aldo Baumgarten, MD   rosuvastatin (CRESTOR) 20 mg tablet Take 20 mg by mouth nightly. Gela Tinajero MD   irbesartan (AVAPRO) 150 mg tablet Take 1 tablet by mouth daily. 1/8/15   Penni Claude, MD       Allergies   Allergen Reactions    Pcn [Penicillins] Rash       Review of Systems  Gen: No fever, chills, malaise, weight loss/gain. Heent: No headache, rhinorrhea, epistaxis, ear pain, hearing loss, sinus pain, neck pain/stiffness, sore throat. Heart: No chest pain, palpitations, JURADO, pnd, or orthopnea. Resp: No cough, hemoptysis, wheezing and shortness of breath. GI: No nausea, vomiting, diarrhea, constipation, melena or hematochezia. : No urinary obstruction, dysuria or hematuria. Derm: No rash, new skin lesion or pruritis. Musc/skeletal: no bone or joint complains. Vasc: No edema, cyanosis or claudication. Endo: No heat/cold intolerance, no polyuria,polydipsia or polyphagia. Neuro: No unilateral weakness, numbness, tingling. No seizures. + dizziness, + near syncope    Heme: No easy bruising or bleeding.           Physical Exam:     Physical Exam:  Visit Vitals  /67   Pulse 62 Temp 98.4 °F (36.9 °C)   Resp 18   Ht 6' (1.829 m)   Wt 99.8 kg (220 lb)   SpO2 100%   BMI 29.84 kg/m²      O2 Device: Room air    Temp (24hrs), Av.4 °F (36.9 °C), Min:98.4 °F (36.9 °C), Max:98.4 °F (36.9 °C)    No intake/output data recorded. No intake/output data recorded. General:  Awake, cooperative, no distress. Head:  Normocephalic, without obvious abnormality, atraumatic. Eyes:  Conjunctivae/corneas clear, sclera anicteric, PERRL, EOMs intact. Nose: Nares normal. No drainage or sinus tenderness. Throat: Lips, mucosa, and tongue normal. .   Neck: Supple, symmetrical, trachea midline, no adenopathy. Lungs:   Clear to auscultation bilaterally. Heart:  Irregular irregular,  S1, S2 normal, no murmur, click, rub or gallop. Abdomen: Soft, non-tender. Bowel sounds normal. No masses,  No organomegaly. Extremities: Extremities normal, atraumatic, no cyanosis or edema. Pulses: 2+ and symmetric all extremities. Skin: Skin color-pink, texture, turgor normal. No rashes or lesions. Capillary refill normal    Neurologic: CNII-XII intact. No focal motor or sensory deficit.        Labs Reviewed:    BMP:   Lab Results   Component Value Date/Time     2021 12:05 PM    K 4.5 2021 12:05 PM     2021 12:05 PM    CO2 24 2021 12:05 PM    AGAP 9 2021 12:05 PM     (H) 2021 12:05 PM    BUN 24 (H) 2021 12:05 PM    CREA 2.09 (H) 2021 12:05 PM    GFRAA 38 (L) 2021 12:05 PM    GFRNA 32 (L) 2021 12:05 PM     CMP:   Lab Results   Component Value Date/Time     2021 12:05 PM    K 4.5 2021 12:05 PM     2021 12:05 PM    CO2 24 2021 12:05 PM    AGAP 9 2021 12:05 PM     (H) 2021 12:05 PM    BUN 24 (H) 2021 12:05 PM    CREA 2.09 (H) 2021 12:05 PM    GFRAA 38 (L) 2021 12:05 PM    GFRNA 32 (L) 2021 12:05 PM    CA 9.5 2021 12:05 PM    ALB 3.9 2021 12:05 PM    TP 6.7 04/07/2021 12:05 PM    GLOB 2.8 04/07/2021 12:05 PM    AGRAT 1.4 04/07/2021 12:05 PM    ALT 20 04/07/2021 12:05 PM     CBC:   Lab Results   Component Value Date/Time    WBC 7.3 04/07/2021 12:05 PM    HGB 15.8 04/07/2021 12:05 PM    HCT 47.7 04/07/2021 12:05 PM     04/07/2021 12:05 PM     All Cardiac Markers in the last 24 hours:   Lab Results   Component Value Date/Time    CPK 63 04/07/2021 12:05 PM    CKMB <1.0 04/07/2021 12:05 PM    CKND1  04/07/2021 12:05 PM     CALCULATION NOT PERFORMED WHEN RESULT IS BELOW LINEAR LIMIT    TROIQ 0.03 04/07/2021 12:05 PM     Recent Glucose Results:   Lab Results   Component Value Date/Time     (H) 04/07/2021 12:05 PM     ABG: No results found for: PH, PHI, PCO2, PCO2I, PO2, PO2I, HCO3, HCO3I, FIO2, FIO2I  COAGS: No results found for: APTT, PTP, INR, INREXT, INREXT  Liver Panel:   Lab Results   Component Value Date/Time    ALB 3.9 04/07/2021 12:05 PM    TP 6.7 04/07/2021 12:05 PM    GLOB 2.8 04/07/2021 12:05 PM    AGRAT 1.4 04/07/2021 12:05 PM    ALT 20 04/07/2021 12:05 PM    AP 72 04/07/2021 12:05 PM     Pancreatic Markers: No results found for: AMYLPOCT, AML, LIPPOCT, LPSE    Ct Head Wo Cont    Result Date: 4/7/2021  CT HEAD W/O CONTRAST History: Dizziness possible stroke CT Technique: Serial axial noncontrast head CT was performed from base of skull to vertex. Coronal and sagittal reformats performed, as needed. Dose reduction technique: Automated exposure control, adjustment of the mAs and/or kVp according to the patient 's size, and iterative reconstruction techniques were used. Specifics for this study were placed by technologist staff into the patient's electronic medical record.  Approximate dose, if determined, reference air kerma: Digital imaging and Communication in Medicine [DICOM] format image data are available to nonaffiliated external healthcare facilities or entities on a secure media free reciprocally searchable basis, with patient authorization, for at least a 12 month period after this study. If not available when requested, the health care system, which is responsible for storage archival and delivery, should be contacted directly. MACRO DOSE No recent prior head exam available. Comparison prior CT exam,  5/6/2013 CT Findings: BRAIN [CEREBRUM/POSTERIOR FOSSA]: Supratentorial  parenchyma has minimally increased now mild deep cerebral and/or central penny white matter disease, nonspecific, but subcortical/ periventricular, relatively symmetric bilaterally, and without evident mass effect. Dilated perivascular space, left central gray. Dystrophic globus pallidus calcification bilaterally. Otherwise parenchyma appears of normal density. There is no discrete intracerebral hematoma, extra-axial collection, focal mass effect, or midline shift. Posterior fossa parenchyma appears grossly unremarkable. EXTRAAXIAL AND MENINGES: Cerebral  and cerebellar sulci and the ventricles appear within normal limits in size and configuration for patient age. There is mild intracranial atherosclerosis CALVARIUM: The visible bony calvarium appears unremarkable. Dystrophic dural falcine ossification MISC: Mild ethmoid sinus mucosal thickening. Moderate opacification of mastoid process right petrous air cells. The visible other included paranasal sinuses and left petrous mastoid air cells appear well developed and aerated. 1.  No evidence of acute intracranial hemorrhage or focal mass effect. 2.  Interval progressive now mild deep cerebral and/or central penny white matter disease, since prior older exam 5-2013 nonspecific, but typical of atrophic demyelination and/or gliosis, very likely from chronic small vessel ischemia in a patient of this age, with intracranial atherosclerosis. 3. Mild ethmoid sinusitis. New right petrous mastoid air cell opacification/effusion.      Xr Chest Port    Result Date: 4/7/2021  CHEST AP PORTABLE, 4/7/2021, Technique: Single frontal view obtained. No recent prior exams. Comparison prior exam,  11/24/2017, . 2 exposures. Findings: There is no appreciable interval change. The lungs are top normally inflated. lungs appear clear. No evident consolidation or pneumothorax. The costophrenic sulci   appear sharp. . The cardiac silhouette is unremarkable in caliber and contour. The mediastinum appears unremarkable  including aortic contour. The pulmonary vascularity is within normal limits. Support catheters/tubes: None. Misc:     1. No radiographic evidence of acute cardiopulmonary disease.     Procedures/imaging: see electronic medical records for all procedures/Xrays and details which were not copied into this note but were reviewed prior to creation of Janice Shafer MD, Internal Medicine     CC: Ritika Bone MD

## 2021-04-07 NOTE — CONSULTS
[facility] Consult Note      Patient: Marsha Aguirre MRN: [id number]  SSN: [id number]    YOB: 1950  Age: 79 y.o. Sex: male        Date of Consultation: 4/7/2021  Referring Physician:   Reason for Consultation: Near-syncope, hypotension, bradycardia    HPI:  I was asked by Dr. Antonio May for near-syncope, hypertension and bradycardia. Marsha Aguirre is a 77-year-old gentleman with known history of atrial fibrillation, CAD, history of PCI in left circumflex artery and chronic history of minimal dizziness on bending and standing from sitting position he was working in his backyard and felt that strong feeling of dizziness / presyncope and patient start he is going to lose consciousness and brought to the hospital by the neighbors.     Past Medical History:   Diagnosis Date    Cancer ([address]     basal cell    Cholestanol storage disease     GERD (gastroesophageal reflux disease)     well controlled with meds    Hypercholesteremia     Hypertension 26359    5yrs    Kidney stones     Nausea & vomiting     Pneumonia      Past Surgical History:   Procedure Laterality Date    CORONARY STENT EA ADDL VESSEL  12/4/2015    CORONARY STENT SINGLE W/PTCA  12/4/2015    HX ADENOIDECTOMY      HX APPENDECTOMY      HX CERVICAL FUSION  05/02/2012    HX HEENT  2013    sinus surgery    HX OTHER SURGICAL      basal cell removal from forehead    HX OTHER SURGICAL  2012    varicose vein surgery    HX TONSILLECTOMY      HX UROLOGICAL  4-13    lithotripsy    LEFT HEART PERCUTANEOUS  12/4/2015    MS CARDIAC SURG PROCEDURE UNLIST      MS CARDIOVERSION ELECTIVE ARRHYTHMIA EXTERNAL N/A 9/23/2019    EP CARDIOVERSION/KEEGAN prior performed by [facility] CATH LAB    RT & LT HEART WITH C.O.  12/3/2017    BRYAN CORONARY ARTERIOGRAPH  12/4/2015    BRYAN CORONARY ARTERIOGRAPH  12/3/2017     Current Facility-Administered Medications   Medication Dose Route Frequency    sodium chloride (NS) flush 5-40 mL 5-40 mL IntraVENous Q8H    sodium chloride (NS) flush 5-40 mL  5-40 mL IntraVENous PRN    acetaminophen (TYLENOL) tablet 650 mg  650 mg Oral Q6H PRN    Or    acetaminophen (TYLENOL) suppository 650 mg  650 mg Rectal Q6H PRN    bisacodyL (DULCOLAX) suppository 10 mg  10 mg Rectal DAILY PRN    promethazine (PHENERGAN) tablet 12.5 mg  12.5 mg Oral Q6H PRN    Or    ondansetron (ZOFRAN) injection 4 mg  4 mg IntraVENous Q6H PRN    [START ON 4/8/2021] loratadine (CLARITIN) tablet 10 mg  10 mg Oral DAILY    [START ON 4/8/2021] pantoprazole (PROTONIX) tablet 40 mg  40 mg Oral ACB    rosuvastatin (CRESTOR) tablet 20 mg  20 mg Oral QHS    0.9% sodium chloride infusion  50 mL/hr IntraVENous CONTINUOUS       Allergies and Intolerances: Allergies   Allergen Reactions    Pcn [Penicillins] Rash       Family History:   Family History   Problem Relation Age of Onset    Malignant Hyperthermia Neg Hx     Pseudocholinesterase Deficiency Neg Hx     Delayed Awakening Neg Hx     Post-op Nausea/Vomiting Neg Hx     Emergence Delirium Neg Hx        Social History:   He  reports that he has never smoked. He has never used smokeless tobacco.  He  reports no history of alcohol use. Review of Systems:     Review of Systems  Gen: No fever, chills, malaise, weight loss/gain. Heent: No headache, rhinorrhea, epistaxis, ear pain, hearing loss, sinus pain, neck pain/stiffness, sore throat. Heart: No chest pain, palpitations, JURADO, pnd, or orthopnea. Resp: No cough, hemoptysis, wheezing and shortness of breath. GI: No nausea, vomiting, diarrhea, constipation, melena or hematochezia. : No urinary obstruction, dysuria or hematuria. Derm: No rash, new skin lesion or pruritis. Musc/skeletal: no bone or joint complains. Vasc: No edema, cyanosis or claudication. Endo: No heat/cold intolerance, no polyuria,polydipsia or polyphagia. Neuro: No unilateral weakness, numbness, tingling. No seizures.    Heme: No easy bruising or bleeding. Physical:   Patient Vitals for the past 6 hrs:   Temp Pulse Resp BP SpO2   04/07/21 1554 97.6 °F (36.4 °C) 83 16 123/63 100 %   04/07/21 1515  62 18 112/67 100 %   04/07/21 1420  (!) 59 15 (!) 105/59 98 %   04/07/21 1341    (!) 109/54 100 %   04/07/21 1325  61 14 (!) 107/54 100 %   04/07/21 1320  62 16 (!) 117/57 100 %   04/07/21 1315  61 16 (!) 111/46 100 %   04/07/21 1156 98.4 °F (36.9 °C) (!) 51 20 (!) 81/45 95 %         Exam:   General Appearance: Comfortable, not using accessory muscles of respiration. HEENT: SUDHAKAR. HEAD: Atraumatic  NECK: No JVD, no thyroidomeglay. LUNGS: Clear bilaterally. HEART: S1 Irregular +S2     ABD: Non-tender, BS Audible    EXT: No edema, and no cysnosis. VASCULAR EXAM: Pulses are intact. PSYCHIATRIC EXAM: Mood is appropriate. MUSCULOSKELETAL: Grossly no joint deformity. NEUROLOGICAL: Motor and sensory sytem intact and Cranial nerves II-XII intact. Review of Data:   LABS:   Lab Results   Component Value Date/Time    WBC 7.3 04/07/2021 12:05 PM    HGB 15.8 04/07/2021 12:05 PM    HCT 47.7 04/07/2021 12:05 PM    PLATELET 271 72/11/5299 12:05 PM     Lab Results   Component Value Date/Time    Sodium 137 04/07/2021 12:05 PM    Potassium 4.5 04/07/2021 12:05 PM    Chloride 104 04/07/2021 12:05 PM    CO2 24 04/07/2021 12:05 PM    Glucose 140 (H) 04/07/2021 12:05 PM    BUN 24 (H) 04/07/2021 12:05 PM    Creatinine 2.09 (H) 04/07/2021 12:05 PM     Lab Results   Component Value Date/Time    Cholesterol, total 159 11/25/2017 01:25 AM    HDL Cholesterol 40 11/25/2017 01:25 AM    LDL, calculated 103.6 (H) 11/25/2017 01:25 AM    Triglyceride 77 11/25/2017 01:25 AM     No components found for: GPT  Lab Results   Component Value Date/Time    Hemoglobin A1c 5.0 11/24/2017 08:50 AM       RADIOLOGY:  CT Results  (Last 48 hours)               04/07/21 1350  CT HEAD WO CONT Final result    Impression:  1.   No evidence of acute intracranial hemorrhage or focal mass effect. 2.  Interval progressive now mild deep cerebral and/or central penny white matter   disease, since prior older exam 5-2013 nonspecific, but typical of atrophic   demyelination and/or gliosis, very likely from chronic small vessel ischemia in   a patient of this age, with intracranial atherosclerosis. 3. Mild ethmoid sinusitis. New right petrous mastoid air cell   opacification/effusion. Narrative:  CT HEAD W/O CONTRAST       History:   Dizziness possible stroke       CT Technique:       Serial axial noncontrast head CT was performed from base of skull to vertex. Coronal and sagittal reformats performed, as needed. Dose reduction technique:    Automated exposure control, adjustment of the mAs and/or kVp according to the   patient 's size, and iterative reconstruction techniques were used. Specifics   for this study were placed by technologist staff into the patient's electronic   medical record. Approximate dose, if determined, reference air kerma:   Digital imaging and Communication in Medicine [DICOM] format image data are   available to nonaffiliated external healthcare facilities or entities on a   secure media free reciprocally searchable basis, with patient authorization, for   at least a 12 month period after this study. If not available when requested,   the health care system, which is responsible for storage archival and delivery,   should be contacted directly. MACRO DOSE       No recent prior head exam available. Comparison prior CT exam,  5/6/2013           CT Findings:               BRAIN [CEREBRUM/POSTERIOR FOSSA]:   Supratentorial  parenchyma has minimally increased now mild deep cerebral and/or   central penny white matter disease, nonspecific, but subcortical/   periventricular, relatively symmetric bilaterally, and without evident mass   effect. Dilated perivascular space, left central gray.  Dystrophic globus   pallidus calcification bilaterally. Otherwise parenchyma appears of normal   density. There is no discrete intracerebral hematoma, extra-axial collection,   focal mass effect, or midline shift. Posterior fossa parenchyma appears grossly unremarkable. EXTRAAXIAL AND MENINGES:   Cerebral  and cerebellar sulci and the ventricles appear within normal limits in   size and configuration for patient age. There is mild intracranial atherosclerosis       CALVARIUM:       The visible bony calvarium appears unremarkable. Dystrophic dural falcine   ossification           MISC:   Mild ethmoid sinus mucosal thickening. Moderate opacification of mastoid process   right petrous air cells. The visible other included paranasal sinuses and left   petrous mastoid air cells appear well developed and aerated. CXR Results  (Last 48 hours)               04/07/21 1215  XR CHEST PORT Final result    Impression:      1. No radiographic evidence of acute cardiopulmonary disease. Narrative:  CHEST AP PORTABLE, 4/7/2021,       Technique:   Single frontal view obtained. No recent prior exams. Comparison prior exam,  11/24/2017, . 2 exposures. Findings: There is no appreciable interval change. The lungs are top normally inflated. lungs appear clear. No evident   consolidation or pneumothorax. The costophrenic sulci   appear sharp. .       The cardiac silhouette is unremarkable in caliber and contour. The mediastinum   appears unremarkable  including aortic contour. The pulmonary vascularity is   within normal limits. Support catheters/tubes:   None.        Misc:                           Cardiology Procedures:   Results for orders placed or performed during the hospital encounter of 04/07/21   EKG, 12 LEAD, INITIAL   Result Value Ref Range    Ventricular Rate 58 BPM    Atrial Rate 326 BPM    QRS Duration 92 ms    Q-T Interval 448 ms    QTC Calculation (Bezet) 439 ms    Calculated R Axis 22 degrees    Calculated T Axis 21 degrees    Diagnosis       Atrial fibrillation with slow ventricular response  Abnormal ECG  When compared with ECG of 23-SEP-2019 10:05,  Atrial fibrillation has replaced Sinus rhythm     Results for orders placed or performed in visit on 10/24/19   AMB POC EKG ROUTINE W/ 12 LEADS, INTER & REP    Impression    Sinus bradycardia at 49 bpm.      Echo Results  (Last 48 hours)    None       Cardiolite (Tc-99m Sestamibi) stress test        Impression / Plan:    Patient Active Problem List   Diagnosis Code    Near syncope R51    UTI (urinary tract infection) N39.0    Essential hypertension, benign I10    Esophageal reflux K21.9    Calculus of kidney N20.0    CHF (congestive heart failure) ([facility]) I50.9    CHF exacerbation ([facility]) I50.9    Cardiomyopathy ([facility]) I42.9    GERD (gastroesophageal reflux disease) K21.9    NSTEMI (non-ST elevated myocardial infarction) ([facility]) I21.4    CAD (coronary artery disease) X71.12    Diastolic CHF, chronic ([facility]) I50.32    Atrial fibrillation ([facility]) I48.91    Dyspnea W71.56    Diastolic CHF, acute on chronic ([facility]) I50.33    Elevated troponin R77.8    Bradycardia R00.1    Hypotension I95.9    Acute renal failure superimposed on stage 3 chronic kidney disease ([facility]) N17.9, N18.30      assessment and plan       near syncope   bradycardia   hypotension   atrial fibrillation   CAD with history of PCI   acute on chronic kidney injury        Plan. Stop all blood pressure medicine   hold metoprolol and Cardizem   hold Xarelto and switched to heparin   check TSH   check serial troponin    From atrial fibrillation standpoint I believe patient have sick sinus syndrome , mild to moderate. At this point no indication for pacemaker. Discussed with the patient and wife. Will monitor the patient.   If patient stabilize and have no more significant bradyarrhythmia or pauses then he may need outpatient one-month cardiac event monitor or loop recorder implantation. Patient underwent cardioversion in 2019 by Dr. Joshua Lomeli. and have seen him in the past.  He was transiently converted into sinus rhythm without symptomatic clinical improvement and AFib ablation was not performed. Thank you for allowing me to participate in management of this pleasant patient. I will continue to follow this patient during the hospitalization.     Signed By: Parish Root MD     April 7, 2021

## 2021-04-07 NOTE — ROUTINE PROCESS
Bedside shift change report given to April B RN (oncoming nurse) by Saurabh Tyler RN (offgoing nurse). Report included the following information SBAR, Kardex, Intake/Output and MAR.

## 2021-04-07 NOTE — Clinical Note
Status[de-identified] INPATIENT [101]   Type of Bed: Telemetry [19]   Inpatient Hospitalization Certified Necessary for the Following Reasons: 3.  Patient receiving treatment that can only be provided in an inpatient setting (further clarification in H&P documentation)   Admitting Diagnosis: Near syncope [5259185]   Admitting Diagnosis: Bradycardia [[id number]]   Admitting Diagnosis: Hypotension [146382]   Admitting Diagnosis: YONY (acute kidney injury) [facility]) [[id number]]   Admitting Physician: Nicolasa Chandler [[id number]]   Attending Physician: Nicolasa Chandler [[id number]]   Estimated Length of Stay: 2 Midnights   Discharge Plan[de-identified] Home with Office Follow-up

## 2021-04-08 ENCOUNTER — APPOINTMENT (OUTPATIENT)
Dept: VASCULAR SURGERY | Age: 71
End: 2021-04-08
Attending: HOSPITALIST
Payer: MEDICARE

## 2021-04-08 ENCOUNTER — APPOINTMENT (OUTPATIENT)
Dept: NON INVASIVE DIAGNOSTICS | Age: 71
End: 2021-04-08
Attending: HOSPITALIST
Payer: MEDICARE

## 2021-04-08 PROBLEM — I49.5 SICK SINUS SYNDROME (HCC): Status: ACTIVE | Noted: 2021-04-08

## 2021-04-08 LAB
ANION GAP SERPL CALC-SCNC: 6 MMOL/L (ref 3–18)
APTT PPP: >180 SEC (ref 23–36.4)
APTT PPP: >180 SEC (ref 23–36.4)
AV R PG: 18.51 MMHG
BASOPHILS # BLD: 0 K/UL (ref 0–0.1)
BASOPHILS NFR BLD: 1 % (ref 0–2)
BUN SERPL-MCNC: 18 MG/DL (ref 7–18)
BUN/CREAT SERPL: 13 (ref 12–20)
CALCIUM SERPL-MCNC: 8.4 MG/DL (ref 8.5–10.1)
CHLORIDE SERPL-SCNC: 113 MMOL/L (ref 100–111)
CK MB CFR SERPL CALC: NORMAL % (ref 0–4)
CK MB CFR SERPL CALC: NORMAL % (ref 0–4)
CK MB SERPL-MCNC: <1 NG/ML (ref 5–25)
CK MB SERPL-MCNC: <1 NG/ML (ref 5–25)
CK SERPL-CCNC: 51 U/L (ref 39–308)
CK SERPL-CCNC: 53 U/L (ref 39–308)
CO2 SERPL-SCNC: 26 MMOL/L (ref 21–32)
CREAT SERPL-MCNC: 1.37 MG/DL (ref 0.6–1.3)
DIFFERENTIAL METHOD BLD: ABNORMAL
ECHO AR MAX VEL PISA: 215 CM/S
ECHO AV ANNULUS DIAM: 3.69 CM
ECHO AV MEAN GRADIENT: 3.3 MMHG
ECHO AV PEAK GRADIENT: 5.82 MMHG
ECHO AV PEAK VELOCITY: 121 CM/S
ECHO AV REGURGITANT PHT: 1128.55 MS
ECHO AV VTI: 22.93 CM
ECHO LA VOL 2C: 109.66 ML (ref 18–58)
ECHO LA VOL 4C: 69.26 ML (ref 18–58)
ECHO LA VOL BP: 96.17 ML (ref 18–58)
ECHO LV E' LATERAL VELOCITY: 12 CM/S
ECHO LV E' SEPTAL VELOCITY: 9 CM/S
ECHO LV EDV A2C: 73.52 ML
ECHO LV EDV A4C: 93.55 ML
ECHO LV EDV BP: 87.43 ML (ref 67–155)
ECHO LV EJECTION FRACTION A2C: 57 %
ECHO LV EJECTION FRACTION A4C: 60 %
ECHO LV EJECTION FRACTION BIPLANE: 59.9 % (ref 55–100)
ECHO LV ESV A2C: 31.38 ML
ECHO LV ESV A4C: 37.3 ML
ECHO LV ESV BP: 35.1 ML (ref 22–58)
ECHO LV INTERNAL DIMENSION DIASTOLIC: 5.74 CM (ref 4.2–5.9)
ECHO LV INTERNAL DIMENSION SYSTOLIC: 4.15 CM
ECHO LV IVSD: 0.77 CM (ref 0.6–1)
ECHO LV MASS 2D: 195.7 G (ref 88–224)
ECHO LV MASS INDEX 2D: 88.2 G/M2 (ref 49–115)
ECHO LV POSTERIOR WALL DIASTOLIC: 1 CM (ref 0.6–1)
ECHO LVOT PEAK GRADIENT: 3.59 MMHG
ECHO LVOT PEAK VELOCITY: 95 CM/S
ECHO LVOT SV: 0 ML
ECHO LVOT SV: 42.1 ML
ECHO LVOT VTI: 17.63 CM
ECHO MV A VELOCITY: 1 CM/S
ECHO MV AREA PHT: 3.94 CM2
ECHO MV E DECELERATION TIME (DT): 192.46 MS
ECHO MV E VELOCITY: 105 CM/S
ECHO MV E/A RATIO: 105
ECHO MV E/E' LATERAL: 8.75
ECHO MV E/E' RATIO (AVERAGED): 10.21
ECHO MV E/E' SEPTAL: 11.67
ECHO MV PRESSURE HALF TIME (PHT): 55.81 MS
ECHO RA AREA 4C: 18.43 CM2
ECHO RV INTERNAL DIMENSION: 3.28 CM
ECHO RV TAPSE: 2.24 CM (ref 1.5–2)
EOSINOPHIL # BLD: 0.3 K/UL (ref 0–0.4)
EOSINOPHIL NFR BLD: 4 % (ref 0–5)
ERYTHROCYTE [DISTWIDTH] IN BLOOD BY AUTOMATED COUNT: 12.9 % (ref 11.6–14.5)
GLUCOSE SERPL-MCNC: 98 MG/DL (ref 74–99)
HCT VFR BLD AUTO: 42.3 % (ref 36–48)
HGB BLD-MCNC: 14 G/DL (ref 13–16)
LEFT BULB EDV: 17.9 CM/S
LEFT BULB PSV: 98.1 CM/S
LEFT CCA DIST DIAS: 23.1 CM/S
LEFT CCA DIST SYS: 118.9 CM/S
LEFT CCA MID DIAS: 33.4 CM/S
LEFT CCA MID SYS: 152.6 CM/S
LEFT CCA PROX DIAS: 13 CM/S
LEFT CCA PROX SYS: 127.1 CM/S
LEFT ECA SYS: 152 CM/S
LEFT ICA DIST DIAS: 17.8 CM/S
LEFT ICA DIST SYS: 72.2 CM/S
LEFT ICA MID DIAS: 30.8 CM/S
LEFT ICA MID SYS: 95.5 CM/S
LEFT ICA PROX DIAS: 12.7 CM/S
LEFT ICA PROX SYS: 69.6 CM/S
LEFT ICA/CCA SYS: 0.8
LEFT SUBCLAVIAN SYS: 164 CM/S
LEFT VERTEBRAL DIAS: 12.6 CM/S
LEFT VERTEBRAL SYS: 43.7 CM/S
LVOT MG: 1.78 MMHG
LYMPHOCYTES # BLD: 1.5 K/UL (ref 0.9–3.6)
LYMPHOCYTES NFR BLD: 24 % (ref 21–52)
MAGNESIUM SERPL-MCNC: 2 MG/DL (ref 1.6–2.6)
MCH RBC QN AUTO: 30.4 PG (ref 24–34)
MCHC RBC AUTO-ENTMCNC: 33.1 G/DL (ref 31–37)
MCV RBC AUTO: 91.8 FL (ref 74–97)
MONOCYTES # BLD: 0.6 K/UL (ref 0.05–1.2)
MONOCYTES NFR BLD: 10 % (ref 3–10)
NEUTS SEG # BLD: 3.9 K/UL (ref 1.8–8)
NEUTS SEG NFR BLD: 61 % (ref 40–73)
PLATELET # BLD AUTO: 144 K/UL (ref 135–420)
PMV BLD AUTO: 12.1 FL (ref 9.2–11.8)
POTASSIUM SERPL-SCNC: 4 MMOL/L (ref 3.5–5.5)
RBC # BLD AUTO: 4.61 M/UL (ref 4.35–5.65)
RIGHT BULB EDV: 19.5 CM/S
RIGHT BULB PSV: 131.4 CM/S
RIGHT CCA DIST DIAS: 30.5 CM/S
RIGHT CCA DIST SYS: 131.4 CM/S
RIGHT CCA MID DIAS: 26.1 CM/S
RIGHT CCA MID SYS: 155.6 CM/S
RIGHT CCA PROX DIAS: 21.7 CM/S
RIGHT CCA PROX SYS: 140.2 CM/S
RIGHT ECA SYS: 150 CM/S
RIGHT ICA DIST DIAS: 36 CM/S
RIGHT ICA DIST SYS: 105.9 CM/S
RIGHT ICA MID DIAS: 17.9 CM/S
RIGHT ICA MID SYS: 72.2 CM/S
RIGHT ICA PROX DIAS: 23 CM/S
RIGHT ICA PROX SYS: 80 CM/S
RIGHT ICA/CCA SYS: 1
RIGHT SUBCLAVIAN SYS: 83 CM/S
RIGHT VERTEBRAL DIAS: 0 CM/S
RIGHT VERTEBRAL SYS: 41.8 CM/S
SODIUM SERPL-SCNC: 145 MMOL/L (ref 136–145)
TROPONIN I SERPL-MCNC: 0.03 NG/ML (ref 0–0.04)
TROPONIN I SERPL-MCNC: 0.03 NG/ML (ref 0–0.04)
WBC # BLD AUTO: 6.4 K/UL (ref 4.6–13.2)

## 2021-04-08 PROCEDURE — 85730 THROMBOPLASTIN TIME PARTIAL: CPT

## 2021-04-08 PROCEDURE — 85025 COMPLETE CBC W/AUTO DIFF WBC: CPT

## 2021-04-08 PROCEDURE — 99218 HC RM OBSERVATION: CPT

## 2021-04-08 PROCEDURE — 80048 BASIC METABOLIC PNL TOTAL CA: CPT

## 2021-04-08 PROCEDURE — 65660000000 HC RM CCU STEPDOWN

## 2021-04-08 PROCEDURE — 74011250636 HC RX REV CODE- 250/636: Performed by: HOSPITALIST

## 2021-04-08 PROCEDURE — 93880 EXTRACRANIAL BILAT STUDY: CPT

## 2021-04-08 PROCEDURE — 82553 CREATINE MB FRACTION: CPT

## 2021-04-08 PROCEDURE — 96376 TX/PRO/DX INJ SAME DRUG ADON: CPT

## 2021-04-08 PROCEDURE — 83735 ASSAY OF MAGNESIUM: CPT

## 2021-04-08 PROCEDURE — 96366 THER/PROPH/DIAG IV INF ADDON: CPT

## 2021-04-08 PROCEDURE — 74011250637 HC RX REV CODE- 250/637: Performed by: INTERNAL MEDICINE

## 2021-04-08 PROCEDURE — 74011250637 HC RX REV CODE- 250/637: Performed by: HOSPITALIST

## 2021-04-08 PROCEDURE — 93306 TTE W/DOPPLER COMPLETE: CPT

## 2021-04-08 PROCEDURE — 36415 COLL VENOUS BLD VENIPUNCTURE: CPT

## 2021-04-08 RX ORDER — METOPROLOL TARTRATE 25 MG/1
12.5 TABLET, FILM COATED ORAL EVERY 12 HOURS
Status: DISCONTINUED | OUTPATIENT
Start: 2021-04-08 | End: 2021-04-09 | Stop reason: HOSPADM

## 2021-04-08 RX ORDER — HEPARIN SODIUM 10000 [USP'U]/100ML
12-36 INJECTION, SOLUTION INTRAVENOUS
Status: ACTIVE | OUTPATIENT
Start: 2021-04-08 | End: 2021-04-08

## 2021-04-08 RX ADMIN — Medication 10 ML: at 22:00

## 2021-04-08 RX ADMIN — RIVAROXABAN 20 MG: 10 TABLET, FILM COATED ORAL at 18:44

## 2021-04-08 RX ADMIN — Medication 10 ML: at 06:00

## 2021-04-08 RX ADMIN — Medication 10 ML: at 14:00

## 2021-04-08 RX ADMIN — ROSUVASTATIN CALCIUM 20 MG: 10 TABLET, COATED ORAL at 21:10

## 2021-04-08 RX ADMIN — PANTOPRAZOLE SODIUM 40 MG: 40 TABLET, DELAYED RELEASE ORAL at 09:23

## 2021-04-08 RX ADMIN — METOPROLOL TARTRATE 12.5 MG: 25 TABLET, FILM COATED ORAL at 21:11

## 2021-04-08 RX ADMIN — HEPARIN SODIUM 12 UNITS/KG/HR: 10000 INJECTION, SOLUTION INTRAVENOUS at 16:25

## 2021-04-08 NOTE — PROGRESS NOTES
Reason for Admission: Dizziness and near syncope    Chart reviewed. Per H&P: Pranav Diaz is a 79 y.o. male with hypertension, CKD, A. Fib presented to ER due to dizziness. .he had been working in the garden, patient presented dizziness, near syncope episode will change position. He denies any chest pain/spob/palpitation before this happened. He was found SBP 81 on arrival of  ER. 2 L normal saline bolus was administrated. BP got improving. EKG indicated A. fib with heart rate 58. His creatinine is 2.09 his baseline was 1.5 in 2017. Cardiologist was consulted. Dr. Kaleigh Moreland recommended hold Xarelto start heparin drip tonight. \"    Care Management/Patient/Family Conversation: 9400: Chart reviewed. CM spoke with the patient and his wife at the bedside and informed them both of the CM's role. The patient confirmed demographics and PCP. CM, the patient and his wife discussed discharge plans to include transportation upon discharge, DME, family support, and transportation to and from appointments. The patient states that he was independent prior to this admission, driving himself places and not using any DME for ambulation. The patient's wife states that she will be available to take the patient home upon discharge. The patient denies any additional questions or concerns at this time. CM to follow the patient's progress and be available to assist with discharge planning as needed. CMS referral placed.     Prior to admission patient was living: Home with wife    Prior to admission patient was using the following DME: None                    RUR Score: 11%                   Plan for utilizing home health: TBD         PCP: First and Last name: Manuel Hope MD    Name of Practice: [address] [place] [address]   Are you a current patient: Yes/No: Yes   Approximate date of last visit: Approximately 1 year ago per patient, annual visit coming up soon   Can you participate in a virtual visit with your PCP: Specialists:  - Cardiology                    Current Advanced Directive/Advance Care Plan: Full Code    Healthcare Decision Maker: Wife - Colin Thomas - [phone]                         Transition of Care Plan: In progress, likely home with family assist and MD follow-up       Care Management Interventions  PCP Verified by CM: Yes  Mode of Transport at Discharge:  Other (see comment)(wife)  Transition of Care Consult (CM Consult): Discharge Planning  Current Support Network: Lives with Spouse  Confirm Follow Up Transport: Self  The Plan for Transition of Care is Related to the Following Treatment Goals : Dizziness, near syncope  Discharge Location  Discharge Placement: Home with family assistance

## 2021-04-08 NOTE — PROGRESS NOTES
Physician Progress Note      PATIENT:               Vero Mills  CSN #:                  [id number]  :                       1950  ADMIT DATE:       2021 11:58 AM  DISCH DATE:  RESPONDING  PROVIDER #:        Christ Alpers MD Devera Southern MD          QUERY TEXT:    Dear Hospitalist,    Patient admitted with syncope, noted to have atrial fibrillation. If possible, please document in progress notes and discharge summary further specificity regarding the type of atrial fibrillation: The medical record reflects the following:    Risk Factors: PMH atrial fibrillation, CAD, History of PCI in left circumflex artery and chronic history of minimal dizziness on bending and standing from sitting position    Clinical Indicators:  > EKG - Atrial fibrillation with slow ventricular response  > EKG 2019- When compared with ECG of 2017 08:49, Sinus rhythm has replaced Atrial fibrillation  > EKG 2017- Atrial fibrillation with rapid ventricular response    Treatment: Receiving Cardiology, EKG, Heparin    Thank you,  Anna Tyler RN, BSN, [place], [facility]  [phone]    Chronic: nonspecific term that could be referring to paroxysmal, persistent, or permanent  Longstanding persistent: persistent and continuous, lasting > 1 year. Paroxysmal - self-terminating or intermittent; resolves with or without intervention within 7 days of onset; may recur with various frequency. Persistent - Fails to terminate within 7 days; Often requires meds or cardioversion to restore to NSR. Permanent - longstanding & persistent; Medication has been ineffective in restoring NSR &/or cardioversion is contraindicated    Definitions per MS-DRG Training Guide and Quick Reference Guide, [address] 5 Diseases and Disorders of the Circulatory System; 2019; . Software content from the [employer]?  Advanced CDI Transformation Program.  Options provided:  -- Paroxysmal Atrial Fibrillation  -- Longstanding Persistent Atrial Fibrillation  -- Permanent Atrial Fibrillation  -- Persistent Atrial Fibrillation  -- Chronic Atrial Fibrillation, unspecified  -- Other - I will add my own diagnosis  -- Disagree - Not applicable / Not valid  -- Disagree - Clinically unable to determine / Unknown  -- Refer to Clinical Documentation Reviewer    PROVIDER RESPONSE TEXT:    This patient has PAF -Paroxysmal Atrial Fibrillation    Query created by: Tolu Vyas on 4/8/2021 10:53 AM      Electronically signed by:  Timbo Somers MD 4/8/2021 1:25 PM

## 2021-04-08 NOTE — PROGRESS NOTES
Hospitalist Progress Note-critical care note     Patient: Ingrid Randhawa MRN: [id number]  CSN: [id number]    YOB: 1950  Age: 79 y.o. Sex: male    DOA: 4/7/2021 LOS:  LOS: 1 day            Chief complaint: near syncope, hypotension, sebastien on ckd3, afib     Assessment/Plan         Hospital Problems  Date Reviewed: 10/24/2019          Codes Class Noted POA    Sick sinus syndrome ([facility] [place] [facility].) ICD-10-CM: I49.5  ICD-9-CM: 427.81  4/8/2021 Unknown        Bradycardia ICD-10-CM: R00.1  ICD-9-CM: 427.89  4/7/2021 Unknown        Hypotension ICD-10-CM: I95.9  ICD-9-CM: 458.9  4/7/2021 Unknown        Acute renal failure superimposed on stage 3 chronic kidney disease ([facility] ICD-10-CM: N17.9, N18.30  ICD-9-CM: 584.9, 585.3  4/7/2021         Atrial fibrillation (HCC) ICD-10-CM: I48.91  ICD-9-CM: 427.31  11/24/2017 Yes        Near syncope ICD-10-CM: R55  ICD-9-CM: 780.2  5/6/2013 Unknown              Near syncope  Likely due to hypotension and sick sinus syndrome  Appreciated cardiologist on board      Hypotension  Hold antihypertension medication  IV hydration  ua and cxr clear for infection          afib -andrew -sick sinus   Hold bbb and ca-blocker due to bp and andrew   On heparin gtt   Taking xarelto at home   Dr. Abiola Watson following      sebastien on ckd3 -improving   Low rate IV hydration. Avoid iv contrast    Subjective : I feel fine, when I can go home, no palpitation, no chest pain, no dizziness   Disposition :tbd,   Review of systems:    General: No fevers or chills. Cardiovascular: No chest pain or pressure. No palpitations. Pulmonary: No shortness of breath. Gastrointestinal: No nausea, vomiting.      Vital signs/Intake and Output:  Visit Vitals  /68 (BP 1 Location: Left upper arm, BP Patient Position: Sitting)   Pulse 96   Temp 98.2 °F (36.8 °C)   Resp 17   Ht 6' (1.829 m)   Wt 99.8 kg (220 lb)   SpO2 98%   BMI 29.84 kg/m²     Current Shift:  04/08 0701 - 04/08 1900  In: 480 [P.O.:480]  Out: -   Last three shifts:  04/06 1901 - 04/08 0700  In: 837 [P.O.:440; I.V.:397]  Out: 1975 [Urine:1975]    Physical Exam:  General: WD, WN. Alert, cooperative, no acute distress    HEENT: NC, Atraumatic. PERRLA, anicteric sclerae. Lungs: CTA Bilaterally. No Wheezing/Rhonchi/Rales. Heart:  Regular  rhythm,  No murmur, No Rubs, No Gallops  Abdomen: Soft, Non distended, Non tender. +Bowel sounds,   Extremities: No c/c/e  Psych:   Not anxious or agitated. Neurologic:  No acute neurological deficit. Labs: Results:       Chemistry Recent Labs     04/08/21  0159 04/07/21  1205   GLU 98 140*    137   K 4.0 4.5   * 104   CO2 26 24   BUN 18 24*   CREA 1.37* 2.09*   CA 8.4* 9.5   AGAP 6 9   BUCR 13 11*   AP  --  72   TP  --  6.7   ALB  --  3.9   GLOB  --  2.8   AGRAT  --  1.4      CBC w/Diff Recent Labs     04/08/21  0159 04/07/21 2010 04/07/21  1205   WBC 6.4 7.1 7.3   RBC 4.61 4.67 5.23   HGB 14.0 14.2 15.8   HCT 42.3 43.1 47.7    145 204   GRANS 61 67 64   LYMPH 24 20* 22   EOS 4 3 5      Cardiac Enzymes Recent Labs     04/08/21  0800 04/08/21  0159   CPK 53 51   CKND1 CALCULATION NOT PERFORMED WHEN RESULT IS BELOW LINEAR LIMIT CALCULATION NOT PERFORMED WHEN RESULT IS BELOW LINEAR LIMIT      Coagulation Recent Labs     04/08/21  1050 04/08/21  0159   APTT >180.0* >180.0*       Lipid Panel Lab Results   Component Value Date/Time    Cholesterol, total 159 11/25/2017 01:25 AM    HDL Cholesterol 40 11/25/2017 01:25 AM    LDL, calculated 103.6 (H) 11/25/2017 01:25 AM    VLDL, calculated 15.4 11/25/2017 01:25 AM    Triglyceride 77 11/25/2017 01:25 AM    CHOL/HDL Ratio 4.0 11/25/2017 01:25 AM      BNP No results for input(s): BNPP in the last 72 hours.    Liver Enzymes Recent Labs     04/07/21  1205   TP 6.7   ALB 3.9   AP 72      Thyroid Studies Lab Results   Component Value Date/Time    TSH 2.74 04/07/2021 12:05 PM        Procedures/imaging: see electronic medical records for all procedures/Xrays and details which were not copied into this note but were reviewed prior to creation of Plan    Ct Head Wo Cont    Result Date: 4/7/2021  CT HEAD W/O CONTRAST History: Dizziness possible stroke CT Technique: Serial axial noncontrast head CT was performed from base of skull to vertex. Coronal and sagittal reformats performed, as needed. Dose reduction technique: Automated exposure control, adjustment of the mAs and/or kVp according to the patient 's size, and iterative reconstruction techniques were used. Specifics for this study were placed by technologist staff into the patient's electronic medical record. Approximate dose, if determined, reference air kerma: Digital imaging and Communication in Medicine [DICOM] format image data are available to nonaffiliated external healthcare facilities or entities on a secure media free reciprocally searchable basis, with patient authorization, for at least a 12 month period after this study. If not available when requested, the health care system, which is responsible for storage archival and delivery, should be contacted directly. MACRO DOSE No recent prior head exam available. Comparison prior CT exam,  5/6/2013 CT Findings: BRAIN [CEREBRUM/POSTERIOR FOSSA]: Supratentorial  parenchyma has minimally increased now mild deep cerebral and/or central penny white matter disease, nonspecific, but subcortical/ periventricular, relatively symmetric bilaterally, and without evident mass effect. Dilated perivascular space, left central gray. Dystrophic globus pallidus calcification bilaterally. Otherwise parenchyma appears of normal density. There is no discrete intracerebral hematoma, extra-axial collection, focal mass effect, or midline shift. Posterior fossa parenchyma appears grossly unremarkable. EXTRAAXIAL AND MENINGES: Cerebral  and cerebellar sulci and the ventricles appear within normal limits in size and configuration for patient age.  There is mild intracranial atherosclerosis CALVARIUM: The visible bony calvarium appears unremarkable. Dystrophic dural falcine ossification MISC: Mild ethmoid sinus mucosal thickening. Moderate opacification of mastoid process right petrous air cells. The visible other included paranasal sinuses and left petrous mastoid air cells appear well developed and aerated. 1.  No evidence of acute intracranial hemorrhage or focal mass effect. 2.  Interval progressive now mild deep cerebral and/or central penny white matter disease, since prior older exam 5-2013 nonspecific, but typical of atrophic demyelination and/or gliosis, very likely from chronic small vessel ischemia in a patient of this age, with intracranial atherosclerosis. 3. Mild ethmoid sinusitis. New right petrous mastoid air cell opacification/effusion. Xr Chest Port    Result Date: 4/7/2021  CHEST AP PORTABLE, 4/7/2021, Technique: Single frontal view obtained. No recent prior exams. Comparison prior exam,  11/24/2017, . 2 exposures. Findings: There is no appreciable interval change. The lungs are top normally inflated. lungs appear clear. No evident consolidation or pneumothorax. The costophrenic sulci   appear sharp. . The cardiac silhouette is unremarkable in caliber and contour. The mediastinum appears unremarkable  including aortic contour. The pulmonary vascularity is within normal limits. Support catheters/tubes: None. Misc:     1. No radiographic evidence of acute cardiopulmonary disease. Duplex Carotid Bilateral    Result Date: 4/8/2021  Bilateral less than 50% stenosis of the internal carotid arteries. No significant stenosis in the external carotid arteries bilaterally. Antegrade flow in both vertebral arteries. Normal flow in both subclavian arteries.        Casper Frazier MD

## 2021-04-08 NOTE — PROGRESS NOTES
Problem: Hypotension  Goal: *Blood pressure within specified parameters  Outcome: Progressing Towards Goal

## 2021-04-08 NOTE — ROUTINE PROCESS
Bedside shift change report given to [address] [place] [address] Road (oncoming nurse) by Precious Garcia RN (offgoing nurse). Report included the following information SBAR, Kardex, Intake/Output and MAR.

## 2021-04-08 NOTE — ED PROVIDER NOTES
EMERGENCY DEPARTMENT HISTORY AND PHYSICAL EXAM    Date: 4/7/2021  Patient Name: Manohar Domingo    History of Presenting Illness     Chief Complaint   Patient presents with    Dizziness         History Provided By: Patient    Rashawn Collins is a 79 y.o. male with PMHX of hypertension, hyperlipidemia, atrial fibrillation on Xarelto, GERD who presents to the emergency department C/O dizziness. Patient reports that sometimes when going from sitting to standing position he feels lightheaded. Reports that today he was working outside and was kneeling on the grass when he got up and felt dizzy and lightheaded, states he felt like he was going to pass out and vision became dark. No syncopal episode. Denies any chest pain, shortness of breath, abdominal pain, nausea, vomiting. Patient reports that he took metoprolol 50, spironolactone 25, Cardizem 180, ibersartan 150 at 7 AM today. Reports he takes these medications at this time every day.   Patient noted to be hypotensive 81/45 with pulse 51 on arrival.    PCP: Elizabeth Diaz MD    Current Facility-Administered Medications   Medication Dose Route Frequency Provider Last Rate Last Admin    sodium chloride (NS) flush 5-40 mL  5-40 mL IntraVENous Q8H Amber Porter MD   10 mL at 04/07/21 1454    sodium chloride (NS) flush 5-40 mL  5-40 mL IntraVENous PRN Amber Porter MD        acetaminophen (TYLENOL) tablet 650 mg  650 mg Oral Q6H PRN Amber Porter MD        Or    acetaminophen (TYLENOL) suppository 650 mg  650 mg Rectal Q6H PRN Amber Porter MD        bisacodyL (DULCOLAX) suppository 10 mg  10 mg Rectal DAILY PRN Amber Porter MD        promethazine (PHENERGAN) tablet 12.5 mg  12.5 mg Oral Q6H PRN Amber Porter MD        Or    [facility] injection 4 mg  4 mg IntraVENous Q6H PRN Amber Porter MD        [START ON 4/8/2021] loratadine (CLARITIN) tablet 10 mg  10 mg Oral DAILY Amber Porter MD        [START ON 4/8/2021] pantoprazole (PROTONIX) tablet 40 mg  40 mg Oral ACB Amber Porter MD        rosuvastatin (CRESTOR) tablet 20 mg  20 mg Oral QHS Rebeca Ibarra MD        0.9% sodium chloride infusion  50 mL/hr IntraVENous CONTINUOUS Rebeca Ibarra MD 50 mL/hr at 04/07/21 1637 50 mL/hr at 04/07/21 1637    heparin (porcine) 25,000 units in 0.45% saline 250 ml infusion  18-36 Units/kg/hr IntraVENous TITRATE Rebeca Ibarra MD 18 mL/hr at 04/07/21 2108 18 Units/kg/hr at 04/07/21 2108       Past History     Past Medical History:  Past Medical History:   Diagnosis Date    Cancer ([facility] [address]     basal cell    Cholestanol storage disease     GERD (gastroesophageal reflux disease)     well controlled with meds    Hypercholesteremia     Hypertension 89232    5yrs    Kidney stones     Nausea & vomiting     Pneumonia        Past Surgical History:  Past Surgical History:   Procedure Laterality Date    CORONARY STENT EA ADDL VESSEL  12/4/2015    CORONARY STENT SINGLE W/PTCA  12/4/2015    HX ADENOIDECTOMY      HX APPENDECTOMY      HX CERVICAL FUSION  05/02/2012    HX HEENT  2013    sinus surgery    HX OTHER SURGICAL      basal cell removal from forehead    HX OTHER SURGICAL  2012    varicose vein surgery    HX TONSILLECTOMY      HX UROLOGICAL  4-13    lithotripsy    LEFT HEART PERCUTANEOUS  12/4/2015    CT CARDIAC SURG PROCEDURE UNLIST      CT CARDIOVERSION ELECTIVE ARRHYTHMIA EXTERNAL N/A 9/23/2019    EP CARDIOVERSION/KEEGAN prior performed by Manoj Yang MD at [facility] CATH LAB    RT & LT HEART WITH C.O.  12/3/2017    BRYAN CORONARY ARTERIOGRAPH  12/4/2015    BRYAN CORONARY ARTERIOGRAPH  12/3/2017       Family History:  Family History   Problem Relation Age of Onset    Malignant Hyperthermia Neg Hx     Pseudocholinesterase Deficiency Neg Hx     Delayed Awakening Neg Hx     Post-op Nausea/Vomiting Neg Hx     Emergence Delirium Neg Hx        Social History:  Social History     Tobacco Use    Smoking status: Never Smoker    Smokeless tobacco: Never Used   Substance Use Topics    Alcohol use: No    Drug use: No       Allergies: Allergies   Allergen Reactions    Pcn [Penicillins] Rash         Review of Systems   Review of Systems   Constitutional: Negative for chills and fever. Respiratory: Negative for shortness of breath. Cardiovascular: Negative for chest pain. Gastrointestinal: Negative for abdominal pain, nausea and vomiting. Neurological: Positive for dizziness and light-headedness. All other systems reviewed and are negative.       Physical Exam     Vitals:    04/07/21 1420 04/07/21 1515 04/07/21 1554 04/07/21 1934   BP: (!) 105/59 112/67 123/63 (!) 135/58   Pulse: (!) 59 62 83 74   Resp: 15 18 16 16   Temp:   97.6 °F (36.4 °C) 97.6 °F (36.4 °C)   SpO2: 98% 100% 100% 99%   Weight:       Height:         Physical Exam    Nursing notes and vital signs reviewed    Constitutional: Non toxic appearing, moderate distress  Head: Normocephalic, Atraumatic  Eyes: EOMI  Neck: Supple  Cardiovascular: Bradycardic, irregularly irregular, no murmurs, rubs, or gallops  Chest: Normal work of breathing and chest excursion bilaterally  Lungs: Clear to ausculation bilaterally  Abdomen: Soft, non tender, non distended, normoactive bowel sounds  Back: No evidence of trauma or deformity  Extremities: No evidence of trauma or deformity, no LE edema  Skin: Warm and dry, normal cap refill  Neuro: Alert and appropriate, CN intact, normal speech, strength and sensation full and symmetric bilaterally, normal gait, normal coordination  Psychiatric: Normal mood and affect      Diagnostic Study Results     Labs -     Recent Results (from the past 12 hour(s))   EKG, 12 LEAD, INITIAL    Collection Time: 04/07/21 12:03 PM   Result Value Ref Range    Ventricular Rate 58 BPM    Atrial Rate 326 BPM    QRS Duration 92 ms    Q-T Interval 448 ms    QTC Calculation (Bezet) 439 ms    Calculated R Axis 22 degrees    Calculated T Axis 21 degrees    Diagnosis       Atrial fibrillation with slow ventricular response  Abnormal ECG  When compared with ECG of 23-SEP-2019 10:05,  Atrial fibrillation has replaced Sinus rhythm     CBC WITH AUTOMATED DIFF    Collection Time: 04/07/21 12:05 PM   Result Value Ref Range    WBC 7.3 4.6 - 13.2 K/uL    RBC 5.23 4.35 - 5.65 M/uL    HGB 15.8 13.0 - 16.0 g/dL    HCT 47.7 36.0 - 48.0 %    MCV 91.2 74.0 - 97.0 FL    MCH 30.2 24.0 - 34.0 PG    MCHC 33.1 31.0 - 37.0 g/dL    RDW 12.6 11.6 - 14.5 %    PLATELET 488 238 - 262 K/uL    MPV 11.4 9.2 - 11.8 FL    NEUTROPHILS 64 40 - 73 %    LYMPHOCYTES 22 21 - 52 %    MONOCYTES 8 3 - 10 %    EOSINOPHILS 5 0 - 5 %    BASOPHILS 1 0 - 2 %    ABS. NEUTROPHILS 4.6 1.8 - 8.0 K/UL    ABS. LYMPHOCYTES 1.6 0.9 - 3.6 K/UL    ABS. MONOCYTES 0.6 0.05 - 1.2 K/UL    ABS. EOSINOPHILS 0.4 0.0 - 0.4 K/UL    ABS. BASOPHILS 0.1 0.0 - 0.1 K/UL    DF AUTOMATED     CARDIAC PANEL,(CK, CKMB & TROPONIN)    Collection Time: 04/07/21 12:05 PM   Result Value Ref Range    CK - MB <1.0 <3.6 ng/ml    CK-MB Index  0.0 - 4.0 %     CALCULATION NOT PERFORMED WHEN RESULT IS BELOW LINEAR LIMIT    CK 63 39 - 308 U/L    Troponin-I, QT 0.03 0.0 - 0.839 NG/ML   METABOLIC PANEL, COMPREHENSIVE    Collection Time: 04/07/21 12:05 PM   Result Value Ref Range    Sodium 137 136 - 145 mmol/L    Potassium 4.5 3.5 - 5.5 mmol/L    Chloride 104 100 - 111 mmol/L    CO2 24 21 - 32 mmol/L    Anion gap 9 3.0 - 18 mmol/L    Glucose 140 (H) 74 - 99 mg/dL    BUN 24 (H) 7.0 - 18 MG/DL    Creatinine 2.09 (H) 0.6 - 1.3 MG/DL    BUN/Creatinine ratio 11 (L) 12 - 20      GFR est AA 38 (L) >60 ml/min/1.73m2    GFR est non-AA 32 (L) >60 ml/min/1.73m2    Calcium 9.5 8.5 - 10.1 MG/DL    Bilirubin, total 0.6 0.2 - 1.0 MG/DL    ALT (SGPT) 20 16 - 61 U/L    AST (SGOT) 17 10 - 38 U/L    Alk.  phosphatase 72 45 - 117 U/L    Protein, total 6.7 6.4 - 8.2 g/dL    Albumin 3.9 3.4 - 5.0 g/dL    Globulin 2.8 2.0 - 4.0 g/dL    A-G Ratio 1.4 0.8 - 1.7     TSH 3RD GENERATION    Collection Time: 04/07/21 12:05 PM   Result Value Ref Range    TSH 2.74 0.36 - 3. 74 uIU/mL   URINALYSIS W/ RFLX MICROSCOPIC    Collection Time: 04/07/21  4:05 PM   Result Value Ref Range    Color YELLOW      Appearance CLEAR      Specific gravity 1.014 1.005 - 1.030      pH (UA) 5.0 5.0 - 8.0      Protein Negative NEG mg/dL    Glucose Negative NEG mg/dL    Ketone Negative NEG mg/dL    Bilirubin Negative NEG      Blood Negative NEG      Urobilinogen 0.2 0.2 - 1.0 EU/dL    Nitrites Negative NEG      Leukocyte Esterase Negative NEG     DRUG SCREEN, URINE    Collection Time: 04/07/21  4:05 PM   Result Value Ref Range    BENZODIAZEPINES Negative NEG      BARBITURATES Negative NEG      THC (TH-CANNABINOL) Negative NEG      OPIATES Negative NEG      PCP(PHENCYCLIDINE) Negative NEG      COCAINE Negative NEG      AMPHETAMINES Negative NEG      METHADONE Negative NEG      HDSCOM (NOTE)    CBC WITH AUTOMATED DIFF    Collection Time: 04/07/21  8:10 PM   Result Value Ref Range    WBC 7.1 4.6 - 13.2 K/uL    RBC 4.67 4.35 - 5.65 M/uL    HGB 14.2 13.0 - 16.0 g/dL    HCT 43.1 36.0 - 48.0 %    MCV 92.3 74.0 - 97.0 FL    MCH 30.4 24.0 - 34.0 PG    MCHC 32.9 31.0 - 37.0 g/dL    RDW 12.8 11.6 - 14.5 %    PLATELET 432 507 - 609 K/uL    MPV 11.6 9.2 - 11.8 FL    NEUTROPHILS 67 40 - 73 %    LYMPHOCYTES 20 (L) 21 - 52 %    MONOCYTES 9 3 - 10 %    EOSINOPHILS 3 0 - 5 %    BASOPHILS 0 0 - 2 %    ABS. NEUTROPHILS 4.8 1.8 - 8.0 K/UL    ABS. LYMPHOCYTES 1.4 0.9 - 3.6 K/UL    ABS. MONOCYTES 0.6 0.05 - 1.2 K/UL    ABS. EOSINOPHILS 0.2 0.0 - 0.4 K/UL    ABS.  BASOPHILS 0.0 0.0 - 0.1 K/UL    DF AUTOMATED     PTT    Collection Time: 04/07/21  8:10 PM   Result Value Ref Range    aPTT 34.8 23.0 - 36.4 SEC   CARDIAC PANEL,(CK, CKMB & TROPONIN)    Collection Time: 04/07/21  8:10 PM   Result Value Ref Range    CK - MB <1.0 <3.6 ng/ml    CK-MB Index  0.0 - 4.0 %     CALCULATION NOT PERFORMED WHEN RESULT IS BELOW LINEAR LIMIT    CK 54 39 - 308 U/L    Troponin-I, QT 0.02 0.0 - 0.045 NG/ML       Radiologic Studies -   CT HEAD WO CONT Final Result   1. No evidence of acute intracranial hemorrhage or focal mass effect. 2.  Interval progressive now mild deep cerebral and/or central penny white matter   disease, since prior older exam 5-2013 nonspecific, but typical of atrophic   demyelination and/or gliosis, very likely from chronic small vessel ischemia in   a patient of this age, with intracranial atherosclerosis. 3. Mild ethmoid sinusitis. New right petrous mastoid air cell   opacification/effusion. XR CHEST PORT   Final Result      1. No radiographic evidence of acute cardiopulmonary disease. CT Results  (Last 48 hours)               04/07/21 1350  CT HEAD WO CONT Final result    Impression:  1. No evidence of acute intracranial hemorrhage or focal mass effect. 2.  Interval progressive now mild deep cerebral and/or central penny white matter   disease, since prior older exam 5-2013 nonspecific, but typical of atrophic   demyelination and/or gliosis, very likely from chronic small vessel ischemia in   a patient of this age, with intracranial atherosclerosis. 3. Mild ethmoid sinusitis. New right petrous mastoid air cell   opacification/effusion. Narrative:  CT HEAD W/O CONTRAST       History:   Dizziness possible stroke       CT Technique:       Serial axial noncontrast head CT was performed from base of skull to vertex. Coronal and sagittal reformats performed, as needed. Dose reduction technique:    Automated exposure control, adjustment of the mAs and/or kVp according to the   patient 's size, and iterative reconstruction techniques were used. Specifics   for this study were placed by technologist staff into the patient's electronic   medical record.        Approximate dose, if determined, reference air kerma:   Digital imaging and Communication in Medicine [DICOM] format image data are   available to nonaffiliated external healthcare facilities or entities on a   secure media free reciprocally searchable basis, with patient authorization, for   at least a 12 month period after this study. If not available when requested,   the health care system, which is responsible for storage archival and delivery,   should be contacted directly. MACRO DOSE       No recent prior head exam available. Comparison prior CT exam,  5/6/2013           CT Findings:               BRAIN [CEREBRUM/POSTERIOR FOSSA]:   Supratentorial  parenchyma has minimally increased now mild deep cerebral and/or   central penny white matter disease, nonspecific, but subcortical/   periventricular, relatively symmetric bilaterally, and without evident mass   effect. Dilated perivascular space, left central gray. Dystrophic globus   pallidus calcification bilaterally. Otherwise parenchyma appears of normal   density. There is no discrete intracerebral hematoma, extra-axial collection,   focal mass effect, or midline shift. Posterior fossa parenchyma appears grossly unremarkable. EXTRAAXIAL AND MENINGES:   Cerebral  and cerebellar sulci and the ventricles appear within normal limits in   size and configuration for patient age. There is mild intracranial atherosclerosis       CALVARIUM:       The visible bony calvarium appears unremarkable. Dystrophic dural falcine   ossification           MISC:   Mild ethmoid sinus mucosal thickening. Moderate opacification of mastoid process   right petrous air cells. The visible other included paranasal sinuses and left   petrous mastoid air cells appear well developed and aerated. CXR Results  (Last 48 hours)               04/07/21 1215  XR CHEST PORT Final result    Impression:      1. No radiographic evidence of acute cardiopulmonary disease. Narrative:  CHEST AP PORTABLE, 4/7/2021,       Technique:   Single frontal view obtained. No recent prior exams. Comparison prior exam,  11/24/2017, . 2 exposures.        Findings: There is no appreciable interval change. The lungs are top normally inflated. lungs appear clear. No evident   consolidation or pneumothorax. The costophrenic sulci   appear sharp. .       The cardiac silhouette is unremarkable in caliber and contour. The mediastinum   appears unremarkable  including aortic contour. The pulmonary vascularity is   within normal limits. Support catheters/tubes:   None. Misc:                         Medications given in the ED-  Medications   sodium chloride (NS) flush 5-40 mL (10 mL IntraVENous Given 4/7/21 1454)   sodium chloride (NS) flush 5-40 mL (has no administration in time range)   acetaminophen (TYLENOL) tablet 650 mg (has no administration in time range)     Or   acetaminophen (TYLENOL) suppository 650 mg (has no administration in time range)   bisacodyL (DULCOLAX) suppository 10 mg (has no administration in time range)   promethazine (PHENERGAN) tablet 12.5 mg (has no administration in time range)     Or   ondansetron (ZOFRAN) injection 4 mg (has no administration in time range)   loratadine (CLARITIN) tablet 10 mg (has no administration in time range)   pantoprazole (PROTONIX) tablet 40 mg (has no administration in time range)   rosuvastatin (CRESTOR) tablet 20 mg (has no administration in time range)   0.9% sodium chloride infusion (50 mL/hr IntraVENous New Bag 4/7/21 1637)   heparin (porcine) 25,000 units in 0.45% saline 250 ml infusion (18 Units/kg/hr × 99.8 kg IntraVENous New Bag 4/7/21 2108)   sodium chloride 0.9 % bolus infusion 1,000 mL (1,000 mL IntraVENous New Bag 4/7/21 1224)   sodium chloride 0.9 % bolus infusion 1,000 mL (1,000 mL IntraVENous New Bag 4/7/21 1232)   sodium chloride 0.9 % bolus infusion 1,000 mL (1,000 mL IntraVENous New Bag 4/7/21 1456)   heparin (porcine) 1,000 unit/mL injection 7,980 Units (7,980 Units IntraVENous Given 4/7/21 2103)         Medical Decision Making   I am the first provider for this patient.     I reviewed the vital signs, available nursing notes, past medical history, past surgical history, family history and social history. Vital Signs-Reviewed the patient's vital signs. Pulse Oximetry Analysis -98% on room air, not hypoxic     Cardiac Monitor:  Rate: 51 bpm  Rhythm: A. fib    EKG interpretation: (Preliminary)  EKG read by Dr. Antonio May at 1203  A. fib with slow ventricular response at rate of 58, QRS 92, QTc 439. No STEMI. Records Reviewed: Nursing Notes    Provider Notes (Medical Decision Making): Marsha Aguirre is a 79 y.o. male with history of A. fib on Xarelto, hypertension presenting with dizziness and near syncopal episode today while going from kneeling to standing position. Patient noted to be hypotensive and bradycardic with irregularly irregular rhythm on arrival.  Patient placed on pacer pads and immediately hydrated with 2 L of IV fluids. On exam clear breath sounds and neurologically intact. Consistent with cardiac etiology. We will plan for CT head, cardiac work-up, cardiology consult and plan for admission. Procedures:  Procedures    ED Course:   Orthostatics negative. 1310 cardiology consulted. Spoke with Dr. Alma Borjas. Recommending holding Xarelto and BP meds. Recommending echo inpatient. Blood pressure improved to 013V systolic after administration of 2 L. Will add on third liter of fluids. Patient asymptomatic at this time with stable vital signs. Troponin 0.03. YONY evident on labs however previous creatinine from 2017. Otherwise no acute lab abnormalities. CT head with no acute abnormalities, findings consistent with intracranial atherosclerosis. We will plan for admission at this time. Discussed results and plan of care with patient. 1440 spoke with  who accepts the patient to her service.     Diagnosis and Disposition     Critical Care Time: 1440  I have spent 45 minutes of critical care time involved in lab review, consultations with specialist, family decision-making, and documentation. During this entire length of time I was immediately available to the patient. Critical Care: The reason for providing this level of medical care for this critically ill patient was due a critical illness that impaired one or more vital organ systems such that there was a high probability of imminent or life threatening deterioration in the patients condition. This care involved high complexity decision making to assess, manipulate, and support vital system functions, to treat this degreee vital organ system failure and to prevent further life threatening deterioration of the patients condition. Core Measures:  For Hospitalized Patients:    1. Hospitalization Decision Time:  The decision to hospitalize the patient was made by Dr. Oscar Howe at [phone] on 4/7/2021    2. Aspirin: Aspirin was not given because the patient did not present with a stroke at the time of their Emergency Department evaluation    Patient is being admitted to the hospital by Dr. Radha Pham. The results of their tests and reasons for their admission have been discussed with them and/or available family. They convey agreement and understanding for the need to be admitted and for their admission diagnosis. CONDITIONS ON ADMISSION:  Sepsis is not present at the time of admission. Deep Vein Thrombosis is not present at the time of admission. Thrombosis is not present at the time of admission. Urinary Tract Infection is not present at the time of admission. Pneumonia is not present at the time of admission. MRSA is not present at the time of admission. Wound infection is not present at the time of admission. Pressure Ulcer is not present at the time of admission. CLINICAL IMPRESSION:    1. Near syncope    2. Bradycardia    3. Hypotension, unspecified hypotension type    4.  [facility]    _______________________________      Please note that this dictation was completed with [employer] the computer voice recognition software. Quite often unanticipated grammatical, syntax, homophones, and other interpretive errors are inadvertently transcribed by the computer software. Please disregard these errors. Please excuse any errors that have escaped final proofreading.

## 2021-04-08 NOTE — PROGRESS NOTES
Bedside shift change report given to 1656 Capri Hunter (oncoming nurse) by Estephania Juan RN (offgoing nurse). Report included the following information SBAR, Kardex and Recent Results.

## 2021-04-09 VITALS
HEART RATE: 93 BPM | WEIGHT: 223.2 LBS | OXYGEN SATURATION: 98 % | TEMPERATURE: 97.8 F | HEIGHT: 72 IN | DIASTOLIC BLOOD PRESSURE: 85 MMHG | SYSTOLIC BLOOD PRESSURE: 143 MMHG | BODY MASS INDEX: 30.23 KG/M2 | RESPIRATION RATE: 16 BRPM

## 2021-04-09 LAB
ANION GAP SERPL CALC-SCNC: 6 MMOL/L (ref 3–18)
BASOPHILS # BLD: 0 K/UL (ref 0–0.1)
BASOPHILS NFR BLD: 1 % (ref 0–2)
BUN SERPL-MCNC: 15 MG/DL (ref 7–18)
BUN/CREAT SERPL: 12 (ref 12–20)
CALCIUM SERPL-MCNC: 8.5 MG/DL (ref 8.5–10.1)
CHLORIDE SERPL-SCNC: 112 MMOL/L (ref 100–111)
CO2 SERPL-SCNC: 26 MMOL/L (ref 21–32)
CREAT SERPL-MCNC: 1.3 MG/DL (ref 0.6–1.3)
DIFFERENTIAL METHOD BLD: NORMAL
EOSINOPHIL # BLD: 0.2 K/UL (ref 0–0.4)
EOSINOPHIL NFR BLD: 4 % (ref 0–5)
ERYTHROCYTE [DISTWIDTH] IN BLOOD BY AUTOMATED COUNT: 12.9 % (ref 11.6–14.5)
GLUCOSE SERPL-MCNC: 94 MG/DL (ref 74–99)
HCT VFR BLD AUTO: 43.1 % (ref 36–48)
HGB BLD-MCNC: 14.4 G/DL (ref 13–16)
LYMPHOCYTES # BLD: 1.2 K/UL (ref 0.9–3.6)
LYMPHOCYTES NFR BLD: 24 % (ref 21–52)
MAGNESIUM SERPL-MCNC: 1.9 MG/DL (ref 1.6–2.6)
MCH RBC QN AUTO: 31 PG (ref 24–34)
MCHC RBC AUTO-ENTMCNC: 33.4 G/DL (ref 31–37)
MCV RBC AUTO: 92.7 FL (ref 74–97)
MONOCYTES # BLD: 0.5 K/UL (ref 0.05–1.2)
MONOCYTES NFR BLD: 10 % (ref 3–10)
NEUTS SEG # BLD: 3.1 K/UL (ref 1.8–8)
NEUTS SEG NFR BLD: 61 % (ref 40–73)
PLATELET # BLD AUTO: 141 K/UL (ref 135–420)
PMV BLD AUTO: 11.7 FL (ref 9.2–11.8)
POTASSIUM SERPL-SCNC: 3.9 MMOL/L (ref 3.5–5.5)
RBC # BLD AUTO: 4.65 M/UL (ref 4.35–5.65)
SODIUM SERPL-SCNC: 144 MMOL/L (ref 136–145)
WBC # BLD AUTO: 5.1 K/UL (ref 4.6–13.2)

## 2021-04-09 PROCEDURE — 99218 HC RM OBSERVATION: CPT

## 2021-04-09 PROCEDURE — 74011250637 HC RX REV CODE- 250/637: Performed by: HOSPITALIST

## 2021-04-09 PROCEDURE — 80048 BASIC METABOLIC PNL TOTAL CA: CPT

## 2021-04-09 PROCEDURE — 85025 COMPLETE CBC W/AUTO DIFF WBC: CPT

## 2021-04-09 PROCEDURE — 83735 ASSAY OF MAGNESIUM: CPT

## 2021-04-09 PROCEDURE — 74011250637 HC RX REV CODE- 250/637: Performed by: INTERNAL MEDICINE

## 2021-04-09 PROCEDURE — 36415 COLL VENOUS BLD VENIPUNCTURE: CPT

## 2021-04-09 RX ORDER — METOPROLOL TARTRATE 25 MG/1
12.5 TABLET, FILM COATED ORAL EVERY 12 HOURS
Qty: 60 TAB | Refills: 1 | Status: SHIPPED | OUTPATIENT
Start: 2021-04-09

## 2021-04-09 RX ADMIN — PANTOPRAZOLE SODIUM 40 MG: 40 TABLET, DELAYED RELEASE ORAL at 06:45

## 2021-04-09 RX ADMIN — Medication 10 ML: at 06:45

## 2021-04-09 RX ADMIN — METOPROLOL TARTRATE 12.5 MG: 25 TABLET, FILM COATED ORAL at 09:59

## 2021-04-09 NOTE — PROGRESS NOTES
Cardiology Progress Note        Patient: Gabriel Raymond        Sex: male          DOA: 4/7/2021  YOB: 1950      Age:  79 y.o.        LOS:  LOS: 1 day   Assessment/Plan     Principal Problem:    Hypotension (4/7/2021)    Active Problems:    Near syncope (5/6/2013)      Atrial fibrillation ([address] (11/24/2017)      Bradycardia (4/7/2021)      Acute renal failure superimposed on stage 3 chronic kidney disease ([address] (4/7/2021)      Sick sinus syndrome ([address] (4/8/2021)        Plan:    Cardiac telemetry atrial fibrillation and resting heart rate is /min    Near-syncope   bradycardia  Hypotension  Sick sinus syndrome       bradycardia and hypotension have improved  Troponin is flat  Echocardiogram EF is normal    I will resume low-dose metoprolol 12.5 mg twice daily  Review Xarelto 20 mg daily  Stop heparin   Patient will need one-month cardiac event monitor. Patient wants to go home and I have encouraged him to stay overnight and stable may be discharged home tomorrow. Subjective:    cc:  Near-syncope   bradycardia  Hypotension  Sick sinus syndrome        REVIEW OF SYSTEMS:     General: No fevers or chills. Cardiovascular: No chest pain or pressure. No palpitations. No ankle swelling  Pulmonary: No SOB, orthopnea, PND  Gastrointestinal: No nausea, vomiting or diarrhea      Objective:      Visit Vitals  BP (!) 152/85 (BP 1 Location: Left upper arm, BP Patient Position: Lying)   Pulse (!) 102   Temp 98.5 °F (36.9 °C)   Resp 16   Ht 6' (1.829 m)   Wt 99.8 kg (220 lb)   SpO2 98%   BMI 29.84 kg/m²     Body mass index is 29.84 kg/m². Physical Exam:  General Appearance: Comfortable, not using accessory muscles of respiration. NECK: No JVD, no thyroidomeglay. LUNGS: Clear bilaterally. HEART: S1 irregular +S2 audible,    ABD: Non-tender, BS Audible    EXT: No edema, and no cysnosis. VASCULAR EXAM: Pulses are intact.     PSYCHIATRIC EXAM: Mood is appropriate. Medication:  Current Facility-Administered Medications   Medication Dose Route Frequency    rivaroxaban (XARELTO) tablet 20 mg  20 mg Oral DAILY    metoprolol tartrate (LOPRESSOR) tablet 12.5 mg  12.5 mg Oral Q12H    sodium chloride (NS) flush 5-40 mL  5-40 mL IntraVENous Q8H    sodium chloride (NS) flush 5-40 mL  5-40 mL IntraVENous PRN    acetaminophen (TYLENOL) tablet 650 mg  650 mg Oral Q6H PRN    Or    acetaminophen (TYLENOL) suppository 650 mg  650 mg Rectal Q6H PRN    bisacodyL (DULCOLAX) suppository 10 mg  10 mg Rectal DAILY PRN    promethazine (PHENERGAN) tablet 12.5 mg  12.5 mg Oral Q6H PRN    Or    ondansetron (ZOFRAN) injection 4 mg  4 mg IntraVENous Q6H PRN    loratadine (CLARITIN) tablet 10 mg  10 mg Oral DAILY    pantoprazole (PROTONIX) tablet 40 mg  40 mg Oral ACB    rosuvastatin (CRESTOR) tablet 20 mg  20 mg Oral QHS               Lab/Data Reviewed:  Procedures/imaging: see electronic medical records for all procedures/Xrays   and details which were not copied into this note but were reviewed prior to creation of Plan       All lab results for the last 24 hours reviewed. Recent Labs     04/08/21 0159 04/07/21 2010 04/07/21  1205   WBC 6.4 7.1 7.3   HGB 14.0 14.2 15.8   HCT 42.3 43.1 47.7    145 204     Recent Labs     04/08/21  0159 04/07/21  1205    137   K 4.0 4.5   * 104   CO2 26 24   GLU 98 140*   BUN 18 24*   CREA 1.37* 2.09*   CA 8.4* 9.5       RADIOLOGY:  CT Results  (Last 48 hours)               04/07/21 1350  CT HEAD WO CONT Final result    Impression:  1. No evidence of acute intracranial hemorrhage or focal mass effect. 2.  Interval progressive now mild deep cerebral and/or central penny white matter   disease, since prior older exam 5-2013 nonspecific, but typical of atrophic   demyelination and/or gliosis, very likely from chronic small vessel ischemia in   a patient of this age, with intracranial atherosclerosis.        3. Mild ethmoid sinusitis. New right petrous mastoid air cell   opacification/effusion. Narrative:  CT HEAD W/O CONTRAST       History:   Dizziness possible stroke       CT Technique:       Serial axial noncontrast head CT was performed from base of skull to vertex. Coronal and sagittal reformats performed, as needed. Dose reduction technique:    Automated exposure control, adjustment of the mAs and/or kVp according to the   patient 's size, and iterative reconstruction techniques were used. Specifics   for this study were placed by technologist staff into the patient's electronic   medical record. Approximate dose, if determined, reference air kerma:   Digital imaging and Communication in Medicine [DICOM] format image data are   available to nonaffiliated external healthcare facilities or entities on a   secure media free reciprocally searchable basis, with patient authorization, for   at least a 12 month period after this study. If not available when requested,   the health care system, which is responsible for storage archival and delivery,   should be contacted directly. MACRO DOSE       No recent prior head exam available. Comparison prior CT exam,  5/6/2013           CT Findings:               BRAIN [CEREBRUM/POSTERIOR FOSSA]:   Supratentorial  parenchyma has minimally increased now mild deep cerebral and/or   central penny white matter disease, nonspecific, but subcortical/   periventricular, relatively symmetric bilaterally, and without evident mass   effect. Dilated perivascular space, left central gray. Dystrophic globus   pallidus calcification bilaterally. Otherwise parenchyma appears of normal   density. There is no discrete intracerebral hematoma, extra-axial collection,   focal mass effect, or midline shift. Posterior fossa parenchyma appears grossly unremarkable.            EXTRAAXIAL AND MENINGES:   Cerebral  and cerebellar sulci and the ventricles appear within normal limits in   size and configuration for patient age. There is mild intracranial atherosclerosis       CALVARIUM:       The visible bony calvarium appears unremarkable. Dystrophic dural falcine   ossification           MISC:   Mild ethmoid sinus mucosal thickening. Moderate opacification of mastoid process   right petrous air cells. The visible other included paranasal sinuses and left   petrous mastoid air cells appear well developed and aerated. CXR Results  (Last 48 hours)               04/07/21 1215  XR CHEST PORT Final result    Impression:      1. No radiographic evidence of acute cardiopulmonary disease. Narrative:  CHEST AP PORTABLE, 4/7/2021,       Technique:   Single frontal view obtained. No recent prior exams. Comparison prior exam,  11/24/2017, . 2 exposures. Findings: There is no appreciable interval change. The lungs are top normally inflated. lungs appear clear. No evident   consolidation or pneumothorax. The costophrenic sulci   appear sharp. .       The cardiac silhouette is unremarkable in caliber and contour. The mediastinum   appears unremarkable  including aortic contour. The pulmonary vascularity is   within normal limits. Support catheters/tubes:   None.        Misc:                           Cardiology Procedures:   Results for orders placed or performed during the hospital encounter of 04/07/21   EKG, 12 LEAD, INITIAL   Result Value Ref Range    Ventricular Rate 58 BPM    Atrial Rate 326 BPM    QRS Duration 92 ms    Q-T Interval 448 ms    QTC Calculation (Bezet) 439 ms    Calculated R Axis 22 degrees    Calculated T Axis 21 degrees    Diagnosis       Atrial fibrillation with slow ventricular response  Abnormal ECG  When compared with ECG of 23-SEP-2019 10:05,  Atrial fibrillation has replaced Sinus rhythm  Confirmed by Talat Chvaez MD. ([id number]) on 4/7/2021 10:47:50 PM     Results for orders placed or performed in visit on 10/24/19   AMB POC EKG ROUTINE W/ 12 LEADS, INTER & REP    Impression    Sinus bradycardia at 49 bpm.      Echo Results  (Last 48 hours)    None       Cardiolite (Tc-99m Sestamibi) stress test    Signed By: Gina Councilman, MD     April 8, 2021

## 2021-04-09 NOTE — PROGRESS NOTES
0715 Bedside and Verbal shift change report given to KENDRA Bryant (oncoming nurse) by Vida Herron RN (offgoing nurse). Report included the following information SBAR, Kardex, Intake/Output, and MAR. Pt in bed, call light in reach. 0745 Pt assessed. No signs of acute distress. Pt in bed. 1030 I have reviewed discharge instructions with the patient. The patient verbalized understanding.

## 2021-04-09 NOTE — DISCHARGE SUMMARY
[address]    Name:  Walker Leo  MR#:   [id number]  :  1950  ACCOUNT #:  [de-identified]  ADMIT DATE:  2021  DISCHARGE DATE:  2021      DISCHARGE DIAGNOSES:  1. Hypotension and bradycardia. 2.  Near syncope. 3.  Acute kidney injury. 4.  Chronic atrial fibrillation. 5.  Hypertension. 6.  History of sick sinus syndrome. HOSPITAL CONSULTANTS:  Dr. Estella Owen, Cardiology. HOSPITAL SUMMARY:  This is a 77-year-old gentleman with new diagnosis of AFib in 2017. He is on a beta-blocker at home, calcium channel blocker as well. He was on Xarelto and Aldactone. The patient came in because he had been working in the garden, he had dizziness and near syncope. When he came into the emergency room, his blood pressure was systolic 81. He received 2 liters bolus fluid. His heart rate was 58. Creatinine came up to 2.09. Baseline is around 1.5 in 2017. So his Xarelto was stopped. He was placed on a heparin drip. He had fluid boluses noted. Beta blocker was held. He got better. Acute kidney injury resolved. AFib is rate controlled. Bradycardia and hypotension are improved. He has been resumed on a lower dose of the beta-blocker, not the calcium channel blocker. He is off the Aldactone. Kidney looks better. Overall, he feels much better over the last 24 hours. He is ready to go home at this point. Cardiology wants him to have an event monitor for going home. They are arranging that through their office. Today, he is awake and alert. Denies any chest pain, chest pressure, shortness of breath, dizziness or lightheadedness. He is urinating without difficulty. He has been eating. No nausea or vomiting. He is ready to go home. Temperature is 97.8, pulse 93, blood pressure 143/85, respiratory rate 16, SaO2 is 98% on room air. Lungs are clear. Cardiac exam, irregular rhythm, regular rate. Abdomen is soft, nontender. Lower extremities without edema. He had a CT scan of his head when he came in that showed no evidence for acute intracranial hemorrhage. There is a central penny white matter disease that looks typical of atrophic demyelination and/or gliosis, very likely from chronic small vessel ischemia with intracranial atherosclerosis and mild ethmoid sinusitis. A chest x-ray showed no acute cardiopulmonary disease. He had a duplex of his carotid arteries that showed bilateral less than 50% stenosis of the internal carotid arteries. Echocardiogram was repleted. It shows ejection fraction 55%-60%. The patient is stable for discharge from the hospital today on metoprolol 12.5 mg twice daily, resume Xarelto 20 mg daily, continue his Prilosec daily, Crestor 20 mg at night, and he is going to hold the aspirin, Cardizem, Avapro and Aldactone for now until his followup with his primary care physician, Dr. Amarilys Ambriz in 1 week at [place] [address], and with Dr. Etta Copeland in 1-2 weeks as well as having the cardiac monitor in place over the next week or so as well. He is stable from a clinical standpoint. His creatinine is now down to 1.3. His troponins were flat. Metabolic panel is normal.  H and H are 14.4 and 43.1, and TSH was normal at 2.74. I spent 40 minutes on discharge time today. I have discussed the plan of care with the patient and Cardiology. He will go home on a cardiac diet. He is a full code.       Bard Davion MD      [id number]  D:  04/09/2021 9:43  T:  04/09/2021 11:35  JOB #:  [id number]  CC:  Noemy Hill MD

## 2021-04-09 NOTE — PROGRESS NOTES
Problem: Hypotension  Goal: *Blood pressure within specified parameters  Outcome: Resolved/Met  Goal: *Fluid volume balance  Outcome: Resolved/Met  Goal: *Labs within defined limits  Outcome: Resolved/Met     Problem: Falls - Risk of  Goal: *Absence of Falls  Description: Document Negin Fall Risk and appropriate interventions in the flowsheet.   Outcome: Resolved/Met     Problem: Patient Education: Go to Patient Education Activity  Goal: Patient/Family Education  Outcome: Resolved/Met

## 2021-04-27 ENCOUNTER — HOSPITAL ENCOUNTER (OUTPATIENT)
Dept: INFUSION THERAPY | Age: 71
Discharge: HOME | End: 2021-04-27

## 2021-04-27 NOTE — PROGRESS NOTES
Pt was scheduled for therapeutic phlebotomy today, was seen in the hospital 4/9/2021. Labs indicated that hemoglobin was 43.1,  This does not require treatment.   Pt is going to wait a month ( per protocol) to return for labs to determine if needed

## 2021-05-18 ENCOUNTER — HOSPITAL ENCOUNTER (OUTPATIENT)
Dept: INFUSION THERAPY | Age: 71
Discharge: HOME | End: 2021-05-18
Payer: MEDICARE

## 2021-05-18 VITALS
OXYGEN SATURATION: 98 % | SYSTOLIC BLOOD PRESSURE: 135 MMHG | RESPIRATION RATE: 18 BRPM | HEART RATE: 69 BPM | DIASTOLIC BLOOD PRESSURE: 79 MMHG | TEMPERATURE: 96.7 F

## 2021-05-18 LAB
BASO+EOS+MONOS # BLD AUTO: 0.7 K/UL (ref 0–2.3)
BASO+EOS+MONOS NFR BLD AUTO: 6 % (ref 0.1–17)
DIFFERENTIAL METHOD BLD: ABNORMAL
ERYTHROCYTE [DISTWIDTH] IN BLOOD BY AUTOMATED COUNT: 13.8 % (ref 11.5–14.5)
HCT VFR BLD AUTO: 46.6 % (ref 36–48)
HGB BLD-MCNC: 15.7 G/DL (ref 12–16)
LYMPHOCYTES # BLD: 1.2 K/UL (ref 1.1–5.9)
LYMPHOCYTES NFR BLD: 9 % (ref 14–44)
MCH RBC QN AUTO: 30.5 PG (ref 25–35)
MCHC RBC AUTO-ENTMCNC: 33.7 G/DL (ref 31–37)
MCV RBC AUTO: 90.7 FL (ref 78–102)
NEUTS SEG # BLD: 11.4 K/UL (ref 1.8–9.5)
NEUTS SEG NFR BLD: 85 % (ref 40–70)
PLATELET # BLD AUTO: 189 K/UL (ref 140–440)
RBC # BLD AUTO: 5.14 M/UL (ref 4.1–5.1)
WBC # BLD AUTO: 13.3 K/UL (ref 4.5–13)

## 2021-05-18 PROCEDURE — 85025 COMPLETE CBC W/AUTO DIFF WBC: CPT

## 2021-05-18 PROCEDURE — 36415 COLL VENOUS BLD VENIPUNCTURE: CPT

## 2021-05-18 NOTE — PROGRESS NOTES
[facility] Progress Note    Date: May 18, 2021    Name: Jason Barbosa    MRN: [id number]         : 1950      Mr. Ifeoma Castillo arrived to [facility] at [phone] to see if he needs Therapeutic phlebotomy . Educated patient the purpose for the treatment, potential side effects. Pt verbalized understanding    Mr. Ifeoma Castillo was assessed and education was provided. Mr. Flores's vitals were reviewed. Visit Vitals  /79 (BP 1 Location: Left arm)   Pulse 69   Temp (!) 96.7 °F (35.9 °C)   Resp 18   SpO2 98%       Blood drawn for labs via Left hand right, condition patent and no redness venipuncture x1 attempt using a 20 G needle, brisk blood return verified. Collected cbc    Lab results were obtained and reviewed. Recent Results (from the past 12 hour(s))   CBC WITH 3 PART DIFF    Collection Time: 21  1:00 PM   Result Value Ref Range    WBC 13.3 (H) 4.5 - 13.0 K/uL    RBC 5.14 (H) 4.10 - 5.10 M/uL    HGB 15.7 12.0 - 16.0 g/dL    HCT 46.6 36 - 48 %    MCV 90.7 78 - 102 FL    MCH 30.5 25.0 - 35.0 PG    MCHC 33.7 31 - 37 g/dL    RDW 13.8 11.5 - 14.5 %    PLATELET 954 994 - 718 K/uL    NEUTROPHILS 85 (H) 40 - 70 %    MIXED CELLS 6 0.1 - 17 %    LYMPHOCYTES 9 (L) 14 - 44 %    ABS. NEUTROPHILS 11.4 (H) 1.8 - 9.5 K/UL    ABS. MIXED CELLS 0.7 0.0 - 2.3 K/uL    ABS. LYMPHOCYTES 1.2 1.1 - 5.9 K/UL    DF AUTOMATED         Hgb 15.7 and Hct 46.6  These results are normal for patients diagnosis, this is why he is being seen in the clinic today. However the labs do not support the need for therapeutic phlebotomy. Explained to patient who verbalized understanding. IV removed/ intact. Gauze/ coban to site.     Mr. Ifeoma Castillo was discharged from [place] [address] in stable condition at 1315  He is to return on 2021 at 900 for his next appointment for labs and potential therapeutic phlebotomy     Pearl Herman RN  May 18, 2021

## 2021-05-19 ENCOUNTER — OFFICE VISIT (OUTPATIENT)
Dept: CARDIOLOGY CLINIC | Age: 71
End: 2021-05-19
Payer: MEDICARE

## 2021-05-19 VITALS
HEART RATE: 61 BPM | WEIGHT: 221 LBS | OXYGEN SATURATION: 97 % | HEIGHT: 72 IN | DIASTOLIC BLOOD PRESSURE: 74 MMHG | SYSTOLIC BLOOD PRESSURE: 130 MMHG | BODY MASS INDEX: 29.93 KG/M2

## 2021-05-19 DIAGNOSIS — I48.91 ATRIAL FIBRILLATION, UNSPECIFIED TYPE (HCC): Primary | ICD-10-CM

## 2021-05-19 DIAGNOSIS — Z98.890 HISTORY OF CARDIOVERSION: ICD-10-CM

## 2021-05-19 DIAGNOSIS — R00.1 BRADYCARDIA: ICD-10-CM

## 2021-05-19 DIAGNOSIS — Z79.01 ON RIVAROXABAN THERAPY: ICD-10-CM

## 2021-05-19 DIAGNOSIS — I48.91 ATRIAL FIBRILLATION, UNSPECIFIED TYPE (HCC): ICD-10-CM

## 2021-05-19 DIAGNOSIS — I49.5 SICK SINUS SYNDROME (HCC): ICD-10-CM

## 2021-05-19 PROCEDURE — 93000 ELECTROCARDIOGRAM COMPLETE: CPT | Performed by: INTERNAL MEDICINE

## 2021-05-19 PROCEDURE — G8427 DOCREV CUR MEDS BY ELIG CLIN: HCPCS | Performed by: INTERNAL MEDICINE

## 2021-05-19 PROCEDURE — 1101F PT FALLS ASSESS-DOCD LE1/YR: CPT | Performed by: INTERNAL MEDICINE

## 2021-05-19 PROCEDURE — 99215 OFFICE O/P EST HI 40 MIN: CPT | Performed by: INTERNAL MEDICINE

## 2021-05-19 PROCEDURE — G8754 DIAS BP LESS 90: HCPCS | Performed by: INTERNAL MEDICINE

## 2021-05-19 PROCEDURE — G8510 SCR DEP NEG, NO PLAN REQD: HCPCS | Performed by: INTERNAL MEDICINE

## 2021-05-19 PROCEDURE — G8752 SYS BP LESS 140: HCPCS | Performed by: INTERNAL MEDICINE

## 2021-05-19 PROCEDURE — G8419 CALC BMI OUT NRM PARAM NOF/U: HCPCS | Performed by: INTERNAL MEDICINE

## 2021-05-19 PROCEDURE — G8536 NO DOC ELDER MAL SCRN: HCPCS | Performed by: INTERNAL MEDICINE

## 2021-05-19 PROCEDURE — 3017F COLORECTAL CA SCREEN DOC REV: CPT | Performed by: INTERNAL MEDICINE

## 2021-05-19 RX ORDER — DILTIAZEM HYDROCHLORIDE 180 MG/1
CAPSULE, COATED, EXTENDED RELEASE ORAL
COMMUNITY
Start: 2021-03-06 | End: 2021-12-15

## 2021-05-19 RX ORDER — CHOLECALCIFEROL (VITAMIN D3) 125 MCG
50 CAPSULE ORAL DAILY
COMMUNITY

## 2021-05-19 RX ORDER — IRBESARTAN 150 MG/1
150 TABLET ORAL DAILY
COMMUNITY
Start: 2021-04-22

## 2021-05-19 RX ORDER — SODIUM CHLORIDE 0.9 % (FLUSH) 0.9 %
5-40 SYRINGE (ML) INJECTION EVERY 8 HOURS
Status: CANCELLED | OUTPATIENT
Start: 2021-05-19

## 2021-05-19 RX ORDER — BRIMONIDINE TARTRATE 1 MG/ML
1 SOLUTION/ DROPS OPHTHALMIC 3 TIMES DAILY
COMMUNITY
Start: 2021-04-24 | End: 2021-10-26

## 2021-05-19 RX ORDER — SPIRONOLACTONE 25 MG/1
25 TABLET ORAL DAILY
COMMUNITY

## 2021-05-19 RX ORDER — SODIUM CHLORIDE 0.9 % (FLUSH) 0.9 %
5-40 SYRINGE (ML) INJECTION AS NEEDED
Status: CANCELLED | OUTPATIENT
Start: 2021-05-19

## 2021-05-19 RX ORDER — ASPIRIN 81 MG/1
81 TABLET ORAL DAILY
COMMUNITY

## 2021-05-19 NOTE — PATIENT INSTRUCTIONS
[facility] [place] Patient  EP Instructions 1. You are scheduled to have a KEEGAN Atrial Fibrillation Ablation with Loop Implant  on  June 22, 2021 , at 0915 am.    Please check in at [facility] [address] 134 am. 
 
2. Please go to [facility] and kevin in the outpatient parking lot that is located around to the back of the hospital and enter through the [employer]. Once you enter through the [facility] check in with the  there. The  will either give you directions or assist you in getting to the cath holding area. 3.  You are not to eat or drink anything after midnight the night before your procedure. 4. Please continue to take your medications with a small sip of water on the morning of the procedure with the following exceptions:  Hole Xarelto 24 hours prior 5. If you are diabetic, do not take your insulin/sugar pill the morning of the procedure. 6. We encourage families to wait in the waiting room on the first floor while the procedure is being done. The Doctor will come out and talk with you as soon as the procedure is over. 7. There is the possibility that you may spend the night in the hospital, depending on the results of the procedure. This will be determined after the procedure is done. 8.   If you or your family have any questions, please call our office Monday-Friday 9:00am  
      -4:30 pm , at [phone], and ask to speak to one of the nurses.  
 
9. Please have COVID testing done on June 16, 2021 between hours of 1-3:30 pm

## 2021-05-19 NOTE — PROGRESS NOTES
Lucina Maldonado presents today for   Chief Complaint   Patient presents with    New Patient     Ref by [facility]. for AFib       Lucina Maldonado preferred language for health care discussion is english/other. Is someone accompanying this pt? yes    Is the patient using any DME equipment during [address]? no    Depression Screening:  3 most recent PHQ Screens 5/19/2021   Little interest or pleasure in doing things Not at all   Feeling down, depressed, irritable, or hopeless Not at all   Total Score PHQ 2 0       Learning Assessment:  Learning Assessment 5/19/2021   PRIMARY LEARNER Patient   HIGHEST LEVEL OF EDUCATION - PRIMARY LEARNER  -   BARRIERS PRIMARY LEARNER -   [facility] NAME -   PRIMARY LANGUAGE ENGLISH   LEARNER PREFERENCE PRIMARY DEMONSTRATION   ANSWERED BY patient   RELATIONSHIP SELF       Abuse Screening:  Abuse Screening Questionnaire 5/19/2021   Do you ever feel afraid of your partner? N   Are you in a relationship with someone who physically or mentally threatens you? N   Is it safe for you to go home? Y       Fall Risk  Fall Risk Assessment, last 12 mths 5/19/2021   Able to walk? Yes   Fall in past 12 months? 0   Do you feel unsteady? 0   Are you worried about falling 0           Pt currently taking Anticoagulant therapy? Xarelto 20 mg once a day    Pt currently taking Antiplatelet therapy ? ASA 81 mg once a day      Coordination of Care:  1. Have you been to the ER, urgent care clinic since your last visit? Hospitalized since your last visit? Yes in April 2021    2. Have you seen or consulted any other health care providers outside of the [address] [place] [address] since your last visit? Include any pap smears or colon screening.  no

## 2021-05-19 NOTE — PROGRESS NOTES
History of Present Illness:  79year old male, referred back by Dr. Fred Matt for evaluation of atrial fibrillation. I performed a cardioversion at the end of 2019. He maintained sinus rhythm for about a month. Over the past year he has noticed that he just does not have the energy that he did. In April he was admitted to the hospital with significant bradycardia and his AV blocking agents were decreased, but he has been placed back on with lower dose. When I talked to Mr. Puja Perez, he is most worried about long term his energy levels, whereas his wife is worried more about passing out. When I did his cardioversion in 2019, he had significant left atrial enlargement that seems to have improved, at least based on the echocardiogram in April, and he is here to discuss options. Impression:  1. History of presyncope with associated bradycardia, in April admitted to [facility], now with decreased AV blocking agents. 2. History of paroxysmal symptomatic atrial fibrillation, now with some increasing fatigue. 3. History of cardioversion September, 2019 with recurrence. 4. Chronic Xarelto use. 5. Echocardiogram April, 2021 with normal function, mild left atrial enlargement. Plan:  He remains on Cardizem, as well as Metoprolol, but lower doses. His heart rate is controlled. Clearly he has an element of sick sinus syndrome associated with this atrial fibrillation. Given the chance for improvement in some of his energy levels, we decided after discussing to proceed with atrial fibrillation pulmonary vein isolation with cryo. Overall I discussed a 70% chance of success and I will plan to place a subcutaneous loop recorder on the same day, as his wife is very worried about any recurrent bradycardia that may occur. If there is progressive bradycardia based upon the loop recorder after ablation, I discussed proceeding to pacemaker at a later date. All questions answered.   Risks, benefits and alternatives discussed and I will make arrangements. Past Medical History:   Diagnosis Date    Cancer [address]     basal cell    Cholestanol storage disease     GERD (gastroesophageal reflux disease)     well controlled with meds    Hypercholesteremia     Hypertension 20007    5yrs    Kidney stones     Nausea & vomiting     Pneumonia        Current Outpatient Medications   Medication Sig Dispense Refill    cholecalciferol, vitamin D3, (Vitamin D3) 50 mcg (2,000 unit) tab Take  by mouth.  aspirin delayed-release 81 mg tablet Take  by mouth daily.  irbesartan (AVAPRO) 150 mg tablet       spironolactone (ALDACTONE) 25 mg tablet Take  by mouth daily.  dilTIAZem ER (CARDIZEM CD) 180 mg capsule TAKE 1 CAPSULE BY ORAL ROUTE EVERY DAY      Alphagan P 0.1 % ophthalmic solution       metoprolol tartrate (LOPRESSOR) 25 mg tablet Take 0.5 Tabs by mouth every twelve (12) hours. 60 Tab 1    rivaroxaban (XARELTO) 20 mg tab tablet Take 1 Tab by mouth daily (with dinner). 30 Tab 0    omeprazole (PRILOSEC) 40 mg capsule Take 40 mg by mouth daily.  Cetirizine (ZYRTEC) 10 mg cap Take 10 mg by mouth.  rosuvastatin (CRESTOR) 20 mg tablet Take 20 mg by mouth nightly. Social History   reports that he has never smoked. He has never used smokeless tobacco.   reports no history of alcohol use. Family History  family history is not on file. Review of Systems  Except as stated above include:  Constitutional: Negative for fever, chills and malaise/fatigue. HEENT: No congestion or recent URI. Gastrointestinal: No nausea, vomiting, abdominal pain, bloody stools. Pulmonary:  Negative except as stated above. Cardiac:  Negative except as stated above. Musculoskeletal: Negative except as stated above. Neurological:  No localized symptoms. Skin:  Negative except as stated above. Psych:  Negative except as stated above. Endocrine:  Negative except as stated above.     PHYSICAL EXAM  BP Readings from Last 3 Encounters: 05/19/21 130/74   05/18/21 135/79   04/09/21 (!) 143/85     Pulse Readings from Last 3 Encounters:   05/19/21 61   05/18/21 69   04/09/21 93     Wt Readings from Last 3 Encounters:   05/19/21 100.2 kg (221 lb)   04/09/21 101.2 kg (223 lb 3.2 oz)   10/24/19 101.2 kg (223 lb)     General:   Well developed, well groomed. Head/Neck:   No obvious jugular venous distention     No obvious carotid pulsations. No evidence of xanthelasma. Lungs:   No respiratory distress. Clear bilaterally. Heart:  Irreg. Normal S1/S2. Palpation grossly normal.    No significant murmurs, rubs or gallops. Abdomen:   Non-acute abdomen. No obvious pulsations. Extremities:   Intact peripheral pulses. No significant edema. Neurological:   Alert and oriented to person, place, time. No focal neurological deficit visually. Skin:   No obvious rash    Blood Pressure Metric:  Monitor recommended and adjustments stated if needed.

## 2021-05-20 ENCOUNTER — HOSPITAL ENCOUNTER (OUTPATIENT)
Dept: CT IMAGING | Age: 71
Discharge: HOME | End: 2021-05-20
Attending: INTERNAL MEDICINE
Payer: MEDICARE

## 2021-05-20 ENCOUNTER — TRANSCRIBE ORDER (OUTPATIENT)
Dept: CARDIAC CATH/INVASIVE PROCEDURES | Age: 71
End: 2021-05-20

## 2021-05-20 DIAGNOSIS — I48.91 AFIB (HCC): Primary | ICD-10-CM

## 2021-05-20 PROCEDURE — 82565 ASSAY OF CREATININE: CPT

## 2021-06-16 ENCOUNTER — HOSPITAL ENCOUNTER (OUTPATIENT)
Dept: CT IMAGING | Age: 71
Discharge: HOME | End: 2021-06-16
Attending: INTERNAL MEDICINE
Payer: MEDICARE

## 2021-06-16 ENCOUNTER — HOSPITAL ENCOUNTER (OUTPATIENT)
Dept: LAB | Age: 71
Discharge: HOME | End: 2021-06-16
Payer: MEDICARE

## 2021-06-16 LAB — CREAT UR-MCNC: 1.4 MG/DL (ref 0.6–1.3)

## 2021-06-16 PROCEDURE — 82565 ASSAY OF CREATININE: CPT

## 2021-06-16 PROCEDURE — U0003 INFECTIOUS AGENT DETECTION BY NUCLEIC ACID (DNA OR RNA); SEVERE ACUTE RESPIRATORY SYNDROME CORONAVIRUS 2 (SARS-COV-2) (CORONAVIRUS DISEASE [COVID-19]), AMPLIFIED PROBE TECHNIQUE, MAKING USE OF HIGH THROUGHPUT TECHNOLOGIES AS DESCRIBED BY CMS-2020-01-R: HCPCS

## 2021-06-16 PROCEDURE — 75572 CT HRT W/3D IMAGE: CPT

## 2021-06-16 PROCEDURE — 74011000636 HC RX REV CODE- 636: Performed by: INTERNAL MEDICINE

## 2021-06-16 RX ADMIN — IOPAMIDOL 96 ML: 755 INJECTION, SOLUTION INTRAVENOUS at 11:00

## 2021-06-17 LAB — SARS-COV-2, COV2NT: NOT DETECTED

## 2021-06-21 NOTE — H&P
Plan was for A.fib ablation today as previously discussed along with loop recorder implant. SBP 90's this morning, he took avapro, diltiazem, metoprolol, and spinolactone this morning. Heart rate 60's in A.fib. Given high chance for worsening hypotension with cardioversion, anesthesia, and vagal from pulmonary vein manipulation, will plan to reschedule. I had a lengthy discussion to decrease avapro to 75 mg daily as he has had many SBP in the 90's at home with fatigue. I discussed and gave instructions to hold the avapro, diltiazem, metoprolol, and xarelto 24 hours prior to rescheduling. Will also try to reschedule at 8am to avoid rebound HTN. Discussed results of chest CT directly with patient and wife concerning small nodules. PMD aware and he will plan to followup with him, likely needs repeat CT to trend size. No significant tobacco use. The patient acknowledged these risks and would like to proceed. History of Present Illness:  79year old male, referred back by Dr. Micki Kwong for evaluation of atrial fibrillation. I performed a cardioversion at the end of 2019. He maintained sinus rhythm for about a month. Over the past year he has noticed that he just does not have the energy that he did. In April he was admitted to the hospital with significant bradycardia and his AV blocking agents were decreased, but he has been placed back on with lower dose. When I talked to Mr. Kramer Service, he is most worried about long term his energy levels, whereas his wife is worried more about passing out. When I did his cardioversion in 2019, he had significant left atrial enlargement that seems to have improved, at least based on the echocardiogram in April, and he is here to discuss options.     Impression:  1. History of presyncope with associated bradycardia, in April admitted to THE [facility], now with decreased AV blocking agents.   2. History of paroxysmal symptomatic atrial fibrillation, now with some increasing fatigue. 3. History of cardioversion September, 2019 with recurrence. 4. Chronic Xarelto use. 5. Echocardiogram April, 2021 with normal function, mild left atrial enlargement.     Plan:  He remains on Cardizem, as well as Metoprolol, but lower doses. His heart rate is controlled. Clearly he has an element of sick sinus syndrome associated with this atrial fibrillation. Given the chance for improvement in some of his energy levels, we decided after discussing to proceed with atrial fibrillation pulmonary vein isolation with cryo. Overall I discussed a 70% chance of success and I will plan to place a subcutaneous loop recorder on the same day, as his wife is very worried about any recurrent bradycardia that may occur. If there is progressive bradycardia based upon the loop recorder after ablation, I discussed proceeding to pacemaker at a later date. All questions answered. Risks, benefits and alternatives discussed and I will make arrangements.             Past Medical History:   Diagnosis Date    Cancer ([facility] [address]       basal cell    Cholestanol storage disease      GERD (gastroesophageal reflux disease)       well controlled with meds    Hypercholesteremia      Hypertension 20007     5yrs    Kidney stones      Nausea & vomiting      Pneumonia                  Current Outpatient Medications   Medication Sig Dispense Refill    cholecalciferol, vitamin D3, (Vitamin D3) 50 mcg (2,000 unit) tab Take  by mouth.        aspirin delayed-release 81 mg tablet Take  by mouth daily.        irbesartan (AVAPRO) 150 mg tablet          spironolactone (ALDACTONE) 25 mg tablet Take  by mouth daily.        dilTIAZem ER (CARDIZEM CD) 180 mg capsule TAKE 1 CAPSULE BY ORAL ROUTE EVERY DAY        Alphagan P 0.1 % ophthalmic solution          metoprolol tartrate (LOPRESSOR) 25 mg tablet Take 0.5 Tabs by mouth every twelve (12) hours.  60 Tab 1    rivaroxaban (XARELTO) 20 mg tab tablet Take 1 Tab by mouth daily (with dinner). 30 Tab 0    omeprazole (PRILOSEC) 40 mg capsule Take 40 mg by mouth daily.        Cetirizine (ZYRTEC) 10 mg cap Take 10 mg by mouth.        rosuvastatin (CRESTOR) 20 mg tablet Take 20 mg by mouth nightly.             Social History   reports that he has never smoked. He has never used smokeless tobacco.   reports no history of alcohol use.     Family History  family history is not on file.     Review of Systems  Except as stated above include:  Constitutional: Negative for fever, chills and malaise/fatigue. HEENT: No congestion or recent URI. Gastrointestinal: No nausea, vomiting, abdominal pain, bloody stools. Pulmonary:  Negative except as stated above. Cardiac:  Negative except as stated above. Musculoskeletal: Negative except as stated above. Neurological:  No localized symptoms. Skin:  Negative except as stated above. Psych:  Negative except as stated above. Endocrine:  Negative except as stated above.     PHYSICAL EXAM      BP Readings from Last 3 Encounters:   05/19/21 130/74   05/18/21 135/79   04/09/21 (!) 143/85          Pulse Readings from Last 3 Encounters:   05/19/21 61   05/18/21 69   04/09/21 93          Wt Readings from Last 3 Encounters:   05/19/21 100.2 kg (221 lb)   04/09/21 101.2 kg (223 lb 3.2 oz)   10/24/19 101.2 kg (223 lb)      General:          Well developed, well groomed. Head/Neck:     No obvious jugular venous distention                           No obvious carotid pulsations. No evidence of xanthelasma. Lungs:             No respiratory distress. Clear bilaterally. Heart:              Irreg. Normal S1/S2. Palpation grossly normal.                          No significant murmurs, rubs or gallops. Abdomen:        Non-acute abdomen. No obvious pulsations. Extremities:     Intact peripheral pulses. No significant edema. Neurological:   Alert and oriented to person, place, time. No focal neurological deficit visually.   Skin:                No obvious rash     Blood Pressure Metric:  Monitor recommended and adjustments stated if needed.         Electronically signed by Julieta Renteria MD at 05/19/21 8232

## 2021-06-22 ENCOUNTER — HOSPITAL ENCOUNTER (OUTPATIENT)
Age: 71
Setting detail: OUTPATIENT SURGERY
Discharge: HOME | End: 2021-06-22
Attending: INTERNAL MEDICINE | Admitting: INTERNAL MEDICINE
Payer: MEDICARE

## 2021-06-22 ENCOUNTER — TRANSCRIBE ORDER (OUTPATIENT)
Dept: CARDIAC CATH/INVASIVE PROCEDURES | Age: 71
End: 2021-06-22

## 2021-06-22 ENCOUNTER — HOSPITAL ENCOUNTER (OUTPATIENT)
Dept: NON INVASIVE DIAGNOSTICS | Age: 71
Discharge: HOME | End: 2021-06-22
Attending: INTERNAL MEDICINE

## 2021-06-22 ENCOUNTER — ANESTHESIA (OUTPATIENT)
Dept: CARDIAC CATH/INVASIVE PROCEDURES | Age: 71
End: 2021-06-22
Payer: MEDICARE

## 2021-06-22 ENCOUNTER — ANESTHESIA EVENT (OUTPATIENT)
Dept: CARDIAC CATH/INVASIVE PROCEDURES | Age: 71
End: 2021-06-22
Payer: MEDICARE

## 2021-06-22 VITALS
HEIGHT: 72 IN | WEIGHT: 220 LBS | HEART RATE: 72 BPM | DIASTOLIC BLOOD PRESSURE: 58 MMHG | BODY MASS INDEX: 29.8 KG/M2 | OXYGEN SATURATION: 97 % | SYSTOLIC BLOOD PRESSURE: 93 MMHG

## 2021-06-22 DIAGNOSIS — I48.91 AFIB (HCC): ICD-10-CM

## 2021-06-22 DIAGNOSIS — R55 SYNCOPE AND COLLAPSE: ICD-10-CM

## 2021-06-22 DIAGNOSIS — I48.91 AFIB (HCC): Primary | ICD-10-CM

## 2021-06-22 LAB
ANION GAP SERPL CALC-SCNC: 4 MMOL/L (ref 3–18)
BUN SERPL-MCNC: 20 MG/DL (ref 7–18)
BUN/CREAT SERPL: 14 (ref 12–20)
CALCIUM SERPL-MCNC: 8.8 MG/DL (ref 8.5–10.1)
CHLORIDE SERPL-SCNC: 109 MMOL/L (ref 100–111)
CO2 SERPL-SCNC: 27 MMOL/L (ref 21–32)
CREAT SERPL-MCNC: 1.42 MG/DL (ref 0.6–1.3)
ERYTHROCYTE [DISTWIDTH] IN BLOOD BY AUTOMATED COUNT: 13.3 % (ref 11.6–14.5)
GLUCOSE SERPL-MCNC: 105 MG/DL (ref 74–99)
HCT VFR BLD AUTO: 44.1 % (ref 36–48)
HGB BLD-MCNC: 15.2 G/DL (ref 13–16)
INR PPP: 1.1 (ref 0.8–1.2)
MCH RBC QN AUTO: 31 PG (ref 24–34)
MCHC RBC AUTO-ENTMCNC: 34.5 G/DL (ref 31–37)
MCV RBC AUTO: 89.8 FL (ref 74–97)
PLATELET # BLD AUTO: 170 K/UL (ref 135–420)
PMV BLD AUTO: 10.4 FL (ref 9.2–11.8)
POTASSIUM SERPL-SCNC: 4.3 MMOL/L (ref 3.5–5.5)
PROTHROMBIN TIME: 14.1 SEC (ref 11.5–15.2)
RBC # BLD AUTO: 4.91 M/UL (ref 4.35–5.65)
SODIUM SERPL-SCNC: 140 MMOL/L (ref 136–145)
WBC # BLD AUTO: 7.2 K/UL (ref 4.6–13.2)

## 2021-06-22 PROCEDURE — 80048 BASIC METABOLIC PNL TOTAL CA: CPT

## 2021-06-22 PROCEDURE — 85027 COMPLETE CBC AUTOMATED: CPT

## 2021-06-22 PROCEDURE — 85610 PROTHROMBIN TIME: CPT

## 2021-06-22 NOTE — ANESTHESIA POSTPROCEDURE EVALUATION
Procedure(s):  ABLATION A-FIB  W COMPLETE EP STUDY/KEEGAN PRIOR  LOOP RECORDER INSERT. general    <BSHSIANPOST>    INITIAL Post-op Vital signs: No vitals data found for the desired time range.

## 2021-06-22 NOTE — ROUTINE PROCESS
0830: Patient escorted to cath holding from waiting area ambulatory, alert and oriented x 4, voicing no complaints. Changed into gown and placed on monitor. NPO since MN. Lab results, med rec and H&P reviewed on chart. PIV x 2 inserted without difficulty. Family to bedside. 1030: Patient's procedure cancelled and to be rescheduled by the office. MD gave patient instruction at bedside for rescheduled procedure.

## 2021-06-22 NOTE — ANESTHESIA PREPROCEDURE EVALUATION
Relevant Problems   No relevant active problems       Anesthetic History     PONV          Review of Systems / Medical History  Patient summary reviewed and pertinent labs reviewed    Pulmonary  Within defined limits                 Neuro/Psych   Within defined limits           Cardiovascular    Hypertension      CHF: NYHA Classification II, dyspnea on exertion  Dysrhythmias : atrial fibrillation  Past MI, CAD, cardiac stents and hyperlipidemia    Exercise tolerance: <4 METS  Comments: ICM  EF 55%  2021  Mild AI  SSS     GI/Hepatic/Renal     GERD: well controlled    Renal disease: CRI       Endo/Other  Within defined limits           Other Findings              Physical Exam    Airway  Mallampati: II  TM Distance: > 6 cm  Neck ROM: normal range of motion   Mouth opening: Normal     Cardiovascular    Rhythm: irregular  Rate: normal         Dental  No notable dental hx       Pulmonary  Breath sounds clear to auscultation               Abdominal  GI exam deferred       Other Findings            Anesthetic Plan    ASA: 4  Anesthesia type: general    Monitoring Plan: Arterial line      Induction: Intravenous  Anesthetic plan and risks discussed with: Patient

## 2021-06-22 NOTE — PROGRESS NOTES
Discussed results directly with patient and wife. PMD aware and he will plan to followup with him, likely needs repeat CT to trend size. No significant tobacco use.

## 2021-06-23 ENCOUNTER — TRANSCRIBE ORDER (OUTPATIENT)
Dept: SCHEDULING | Age: 71
End: 2021-06-23

## 2021-06-23 DIAGNOSIS — R91.1 SOLITARY PULMONARY NODULE: Primary | ICD-10-CM

## 2021-06-29 ENCOUNTER — HOSPITAL ENCOUNTER (OUTPATIENT)
Dept: INFUSION THERAPY | Age: 71
Discharge: HOME | End: 2021-06-29

## 2021-06-29 NOTE — PROGRESS NOTES
Pt arrived today to for cbc to determine if he will need therapeutic phlebotomy. Pt had cbc on 6/22/2021 which indicated he was not in perimeter for therapeutic phlebotomy.   Pt is rescheduling his appointment to 7/20/2021 @ 1100

## 2021-07-02 ENCOUNTER — HOSPITAL ENCOUNTER (OUTPATIENT)
Dept: CT IMAGING | Age: 71
Discharge: HOME | End: 2021-07-02
Attending: FAMILY MEDICINE
Payer: MEDICARE

## 2021-07-02 ENCOUNTER — TELEPHONE (OUTPATIENT)
Dept: CARDIOLOGY CLINIC | Age: 71
End: 2021-07-02

## 2021-07-02 DIAGNOSIS — R91.1 SOLITARY PULMONARY NODULE: ICD-10-CM

## 2021-07-02 DIAGNOSIS — I48.11 LONGSTANDING PERSISTENT ATRIAL FIBRILLATION (HCC): Primary | ICD-10-CM

## 2021-07-02 PROCEDURE — 74011000636 HC RX REV CODE- 636

## 2021-07-02 PROCEDURE — 71260 CT THORAX DX C+: CPT

## 2021-07-02 RX ORDER — SODIUM CHLORIDE 0.9 % (FLUSH) 0.9 %
5-40 SYRINGE (ML) INJECTION AS NEEDED
Status: CANCELLED | OUTPATIENT
Start: 2021-07-02

## 2021-07-02 RX ORDER — SODIUM CHLORIDE 0.9 % (FLUSH) 0.9 %
5-40 SYRINGE (ML) INJECTION EVERY 8 HOURS
Status: CANCELLED | OUTPATIENT
Start: 2021-07-02

## 2021-07-02 RX ADMIN — IOPAMIDOL 100 ML: 612 INJECTION, SOLUTION INTRAVENOUS at 14:50

## 2021-07-02 NOTE — TELEPHONE ENCOUNTER
[facility]          Patient  EP Instructions                  1. You are scheduled to have a A-FIB Ablation on  July 13, 2021 , at 0800 am.    Please check in at 0700 am.    2. Please go to DR. NOAH CALIX and kevin in the outpatient parking lot that is located around to the back of the hospital and enter through the [place]. Once you enter through the [facility] check in with the  there. The  will either give you directions or assist you in getting to the cath holding area. 3.  You are not to eat or drink anything after midnight the night before your procedure. 4. Please continue to take your medications with a small sip of water on the morning of the procedure with the following exceptions:  Hold all your blood pressure medications- irbesartan, spironolactone, diltiazem, metoprolol,  and your Xarelto 24 hours prior. 5. If you are diabetic, do not take your insulin/sugar pill the morning of the procedure. 6. We encourage families to wait in the waiting room on the first floor while the procedure is being done. The Doctor will come out and talk with you as soon as the procedure is over. 7. There is the possibility that you may spend the night in the hospital, depending on the results of the procedure. This will be determined after the procedure is done. 8.   If you or your family have any questions, please call our office Monday-Friday 9:00am         -4:30 pm , at [phone], and ask to speak to one of the nurses. 9. Please have lab work done on July 6, 2021.

## 2021-07-02 NOTE — TELEPHONE ENCOUNTER
----- Message from Kita Moreland sent at 6/22/2021 12:52 PM EDT -----  Regarding: FW: Followup apt  He has been r/s to 7/13 @8:00  ----- Message -----  From: Maxine Landry MD  Sent: 6/22/2021  10:48 AM EDT  To: Kita Moreland  Subject: Followup apt                                     Please reschedule a.fib ablation from today. SBP 90's and he took all his BP meds. Try to do at 8am and I have personally instructed patient to hold his BP meds along with xarelto 24 hours prior.     Dr. Winter Doshi

## 2021-07-06 ENCOUNTER — HOSPITAL ENCOUNTER (OUTPATIENT)
Dept: LAB | Age: 71
Discharge: HOME | End: 2021-07-06
Payer: MEDICARE

## 2021-07-06 DIAGNOSIS — I48.11 LONGSTANDING PERSISTENT ATRIAL FIBRILLATION (HCC): ICD-10-CM

## 2021-07-06 LAB
ALBUMIN SERPL-MCNC: 3.6 G/DL (ref 3.4–5)
ALBUMIN/GLOB SERPL: 1.4 {RATIO} (ref 0.8–1.7)
ALP SERPL-CCNC: 71 U/L (ref 45–117)
ALT SERPL-CCNC: 27 U/L (ref 16–61)
ANION GAP SERPL CALC-SCNC: 3 MMOL/L (ref 3–18)
AST SERPL-CCNC: 19 U/L (ref 10–38)
BASOPHILS # BLD: 0.1 K/UL (ref 0–0.1)
BASOPHILS NFR BLD: 1 % (ref 0–2)
BILIRUB SERPL-MCNC: 1.2 MG/DL (ref 0.2–1)
BUN SERPL-MCNC: 19 MG/DL (ref 7–18)
BUN/CREAT SERPL: 14 (ref 12–20)
CALCIUM SERPL-MCNC: 8.8 MG/DL (ref 8.5–10.1)
CHLORIDE SERPL-SCNC: 109 MMOL/L (ref 100–111)
CO2 SERPL-SCNC: 29 MMOL/L (ref 21–32)
CREAT SERPL-MCNC: 1.34 MG/DL (ref 0.6–1.3)
DIFFERENTIAL METHOD BLD: ABNORMAL
EOSINOPHIL # BLD: 0.3 K/UL (ref 0–0.4)
EOSINOPHIL NFR BLD: 5 % (ref 0–5)
ERYTHROCYTE [DISTWIDTH] IN BLOOD BY AUTOMATED COUNT: 13.6 % (ref 11.6–14.5)
GLOBULIN SER CALC-MCNC: 2.6 G/DL (ref 2–4)
GLUCOSE SERPL-MCNC: 107 MG/DL (ref 74–99)
HCT VFR BLD AUTO: 44.3 % (ref 36–48)
HGB BLD-MCNC: 14.5 G/DL (ref 13–16)
INR PPP: 1.6 (ref 0.8–1.2)
LYMPHOCYTES # BLD: 1 K/UL (ref 0.9–3.6)
LYMPHOCYTES NFR BLD: 18 % (ref 21–52)
MCH RBC QN AUTO: 30.4 PG (ref 24–34)
MCHC RBC AUTO-ENTMCNC: 32.7 G/DL (ref 31–37)
MCV RBC AUTO: 92.9 FL (ref 74–97)
MONOCYTES # BLD: 0.6 K/UL (ref 0.05–1.2)
MONOCYTES NFR BLD: 12 % (ref 3–10)
NEUTS SEG # BLD: 3.5 K/UL (ref 1.8–8)
NEUTS SEG NFR BLD: 64 % (ref 40–73)
PLATELET # BLD AUTO: 150 K/UL (ref 135–420)
PMV BLD AUTO: 11.6 FL (ref 9.2–11.8)
POTASSIUM SERPL-SCNC: 4.4 MMOL/L (ref 3.5–5.5)
PROT SERPL-MCNC: 6.2 G/DL (ref 6.4–8.2)
PROTHROMBIN TIME: 18.5 SEC (ref 11.5–15.2)
RBC # BLD AUTO: 4.77 M/UL (ref 4.35–5.65)
SODIUM SERPL-SCNC: 141 MMOL/L (ref 136–145)
WBC # BLD AUTO: 5.5 K/UL (ref 4.6–13.2)

## 2021-07-06 PROCEDURE — 36415 COLL VENOUS BLD VENIPUNCTURE: CPT

## 2021-07-06 PROCEDURE — 85610 PROTHROMBIN TIME: CPT

## 2021-07-06 PROCEDURE — 85025 COMPLETE CBC W/AUTO DIFF WBC: CPT

## 2021-07-06 PROCEDURE — 80053 COMPREHEN METABOLIC PANEL: CPT

## 2021-07-12 NOTE — H&P
Plan A.fib ablation with possible loop recorder implant. Rescheduled from last month since BP was too low last evaluation. Some patients may still require anti-arrhythmic drug therapy following ablation, and success is defined as afib free from months 3 to 12 after the ablation. The first 3 months is considered \"window period\" for ablation edema to subside and tissue healing, hence, episodes of afib during this period is not considered procedure failure. I have discussed indications, procedures, risks, limitations, and follow up associated with transesophageal echo, electrophysiology study, intracardiac echo, transseptal heart cath, intracardiac mapping, and catheter ablation for atrial fibrillation. Risks included acute respiratory failure, neurovascular injury, soft tissue injury, infection, bleeding, DVT, radiation exposure, atrial septal defect, iv contrast nephropathy, IV contrast allergic reaction, atrial septal defect, perforation, tamponade, potential need for emergent heart surgery, pulmonary vein stenosis that may or may not be amenable to stent placement, phrenic nerve injury/diaphragmatic paralysis that may or may not recover with time, heart block and need for permanent pacing, esophageal injury, dysmotility, or death associated with atrial-esophageal fistula formation, MI, CVA, and death. Reviewed that some of these complications may not be apparent at the time of the procedure. Discussed results of chest CT directly with patient and wife concerning small nodules. PMD aware and he will plan to followup with him, likely needs repeat CT to trend size. No significant tobacco use.     The patient acknowledged these risks and would like to proceed.     History of Present Illness:  73 year old male, referred back by Dr. Chris Hernandez for evaluation of atrial fibrillation.  I performed a cardioversion at the end of 2019. [facility] maintained sinus rhythm for about a month.  Over the past year he has noticed that he just does not have the energy that he did.  In April he was admitted to the hospital with significant bradycardia and his AV blocking agents were decreased, but he has been placed back on with lower dose.  When I talked to Mr. Geri Claude, he is most worried about long term his energy levels, whereas his wife is worried more about passing out.  When I did his cardioversion in 2019, he had significant left atrial enlargement that seems to have improved, at least based on the echocardiogram in April, and he is here to discuss options.     Impression:  1. History of presyncope with associated bradycardia, in April admitted to [facility], now with decreased AV blocking agents. 2. History of paroxysmal symptomatic atrial fibrillation, now with some increasing fatigue. 3. History of cardioversion September, 2019 with recurrence. 4. Chronic Xarelto use.   5. Echocardiogram April, 2021 with normal function, mild left atrial enlargement.     Plan:  He remains on Cardizem, as well as Metoprolol, but lower doses.  His heart rate is controlled.  Clearly he has an element of sick sinus syndrome associated with this atrial fibrillation.  Given the chance for improvement in some of his energy levels, we decided after discussing to proceed with atrial fibrillation pulmonary vein isolation with cryo.  Overall I discussed a 70% chance of success and I will plan to place a subcutaneous loop recorder on the same day, as his wife is very worried about any recurrent bradycardia that may occur.  If there is progressive bradycardia based upon the loop recorder after ablation, I discussed proceeding to pacemaker at a later date.  All questions answered.  Risks, benefits and alternatives discussed and I will make arrangements.                Past Medical History:   Diagnosis Date    Cancer ([facility] [address]       basal cell    Cholestanol storage disease      GERD (gastroesophageal reflux disease)       well controlled with meds    Hypercholesteremia      Hypertension 20007     5yrs    Kidney stones      Nausea & vomiting      Pneumonia                       Current Outpatient Medications   Medication Sig Dispense Refill    cholecalciferol, vitamin D3, (Vitamin D3) 50 mcg (2,000 unit) tab Take  by mouth.        aspirin delayed-release 81 mg tablet Take  by mouth daily.        irbesartan (AVAPRO) 150 mg tablet          spironolactone (ALDACTONE) 25 mg tablet Take  by mouth daily.        dilTIAZem ER (CARDIZEM CD) 180 mg capsule TAKE 1 CAPSULE BY ORAL ROUTE EVERY DAY        Alphagan P 0.1 % ophthalmic solution          metoprolol tartrate (LOPRESSOR) 25 mg tablet Take 0.5 Tabs by mouth every twelve (12) hours. 60 Tab 1    rivaroxaban (XARELTO) 20 mg tab tablet Take 1 Tab by mouth daily (with dinner). 30 Tab 0    omeprazole (PRILOSEC) 40 mg capsule Take 40 mg by mouth daily.        Cetirizine (ZYRTEC) 10 mg cap Take 10 mg by mouth.        rosuvastatin (CRESTOR) 20 mg tablet Take 20 mg by mouth nightly.             Social History   reports that he has never smoked. He has never used smokeless tobacco.   reports no history of alcohol use.     Family History  family history is not on file.     Review of Systems  Except as stated above include:  Constitutional: Negative for fever, chills and malaise/fatigue. HEENT: No congestion or recent URI. Gastrointestinal: No nausea, vomiting, abdominal pain, bloody stools. Pulmonary:  Negative except as stated above. Cardiac:  Negative except as stated above. Musculoskeletal: Negative except as stated above. Neurological:  No localized symptoms. Skin:  Negative except as stated above. Psych:  Negative except as stated above.   Endocrine:  Negative except as stated above.     PHYSICAL EXAM        BP Readings from Last 3 Encounters:   05/19/21 130/74   05/18/21 135/79   04/09/21 (!) 143/85            Pulse Readings from Last 3 Encounters:   05/19/21 61   05/18/21 69   04/09/21 93            Wt Readings from Last 3 Encounters:   05/19/21 100.2 kg (221 lb)   04/09/21 101.2 kg (223 lb 3.2 oz)   10/24/19 101.2 kg (223 lb)      General:          Well developed, well groomed.    Head/Neck:     No obvious jugular venous distention                           No obvious carotid pulsations.                            No evidence of xanthelasma. Lungs:             No respiratory distress.                            Clear bilaterally. Heart:              Irreg.  Normal S1/S2.                            Palpation grossly normal.                          No significant murmurs, rubs or gallops. Abdomen:        Non-acute abdomen.                            No obvious pulsations. Extremities:     Intact peripheral pulses.                            No significant edema.    Neurological:   Alert and oriented to person, place, time.                            No focal neurological deficit visually. Skin:                No obvious rash     Blood Pressure Metric:  Monitor recommended and adjustments stated if needed.

## 2021-07-13 ENCOUNTER — ANESTHESIA EVENT (OUTPATIENT)
Dept: CARDIAC CATH/INVASIVE PROCEDURES | Age: 71
End: 2021-07-13
Payer: MEDICARE

## 2021-07-13 ENCOUNTER — HOSPITAL ENCOUNTER (OUTPATIENT)
Dept: NON INVASIVE DIAGNOSTICS | Age: 71
Discharge: HOME | End: 2021-07-13
Payer: MEDICARE

## 2021-07-13 ENCOUNTER — ANESTHESIA (OUTPATIENT)
Dept: CARDIAC CATH/INVASIVE PROCEDURES | Age: 71
End: 2021-07-13
Payer: MEDICARE

## 2021-07-13 ENCOUNTER — HOSPITAL ENCOUNTER (OUTPATIENT)
Age: 71
Setting detail: OBSERVATION
Discharge: HOME | End: 2021-07-14
Attending: INTERNAL MEDICINE | Admitting: INTERNAL MEDICINE
Payer: MEDICARE

## 2021-07-13 VITALS
DIASTOLIC BLOOD PRESSURE: 82 MMHG | HEIGHT: 72 IN | WEIGHT: 220 LBS | SYSTOLIC BLOOD PRESSURE: 134 MMHG | BODY MASS INDEX: 29.8 KG/M2

## 2021-07-13 DIAGNOSIS — R55 SYNCOPE: ICD-10-CM

## 2021-07-13 DIAGNOSIS — I48.91 AFIB (HCC): ICD-10-CM

## 2021-07-13 DIAGNOSIS — R55 SYNCOPE AND COLLAPSE: ICD-10-CM

## 2021-07-13 DIAGNOSIS — R55 SYNCOPE, UNSPECIFIED SYNCOPE TYPE: ICD-10-CM

## 2021-07-13 DIAGNOSIS — I48.0 PAROXYSMAL ATRIAL FIBRILLATION (HCC): ICD-10-CM

## 2021-07-13 LAB
ACT BLD: 136 SECS (ref 79–138)
ACT BLD: 323 SECS (ref 79–138)
INR BLD: 1.2 (ref 0.9–1.2)

## 2021-07-13 PROCEDURE — 77030020506 HC NDL TRNSPTL NRG BAYL -F: Performed by: INTERNAL MEDICINE

## 2021-07-13 PROCEDURE — C1764 EVENT RECORDER, CARDIAC: HCPCS | Performed by: INTERNAL MEDICINE

## 2021-07-13 PROCEDURE — C1769 GUIDE WIRE: HCPCS | Performed by: INTERNAL MEDICINE

## 2021-07-13 PROCEDURE — 96374 THER/PROPH/DIAG INJ IV PUSH: CPT

## 2021-07-13 PROCEDURE — 85347 COAGULATION TIME ACTIVATED: CPT

## 2021-07-13 PROCEDURE — 77030013079 HC BLNKT BAIR HGGR 3M -A: Performed by: ANESTHESIOLOGY

## 2021-07-13 PROCEDURE — C1733 CATH, EP, OTHR THAN COOL-TIP: HCPCS | Performed by: INTERNAL MEDICINE

## 2021-07-13 PROCEDURE — 74011250636 HC RX REV CODE- 250/636: Performed by: ANESTHESIOLOGY

## 2021-07-13 PROCEDURE — 77030030278 HC COAX UMB DISP MEDT -C: Performed by: INTERNAL MEDICINE

## 2021-07-13 PROCEDURE — C1730 CATH, EP, 19 OR FEW ELECT: HCPCS | Performed by: INTERNAL MEDICINE

## 2021-07-13 PROCEDURE — 77030035291 HC TBNG PMP SMARTABLATE J&J -B: Performed by: INTERNAL MEDICINE

## 2021-07-13 PROCEDURE — 74011000250 HC RX REV CODE- 250: Performed by: INTERNAL MEDICINE

## 2021-07-13 PROCEDURE — 77030018729 HC ELECTRD DEFIB PAD CARD -B: Performed by: INTERNAL MEDICINE

## 2021-07-13 PROCEDURE — 74011250637 HC RX REV CODE- 250/637: Performed by: INTERNAL MEDICINE

## 2021-07-13 PROCEDURE — 74011000250 HC RX REV CODE- 250: Performed by: ANESTHESIOLOGY

## 2021-07-13 PROCEDURE — 36620 INSERTION CATHETER ARTERY: CPT | Performed by: ANESTHESIOLOGY

## 2021-07-13 PROCEDURE — 33285 INSJ SUBQ CAR RHYTHM MNTR: CPT | Performed by: INTERNAL MEDICINE

## 2021-07-13 PROCEDURE — 99218 HC RM OBSERVATION: CPT

## 2021-07-13 PROCEDURE — 77030030261 HC CBL UMB ELEC DISP MEDT -C: Performed by: INTERNAL MEDICINE

## 2021-07-13 PROCEDURE — 74011000258 HC RX REV CODE- 258: Performed by: ANESTHESIOLOGY

## 2021-07-13 PROCEDURE — 77030031139 HC SUT VCRL2 J&J -A: Performed by: INTERNAL MEDICINE

## 2021-07-13 PROCEDURE — C1894 INTRO/SHEATH, NON-LASER: HCPCS | Performed by: INTERNAL MEDICINE

## 2021-07-13 PROCEDURE — 74011000636 HC RX REV CODE- 636: Performed by: INTERNAL MEDICINE

## 2021-07-13 PROCEDURE — 93662 INTRACARDIAC ECG (ICE): CPT | Performed by: INTERNAL MEDICINE

## 2021-07-13 PROCEDURE — 76060000036 HC ANESTHESIA 2.5 TO 3 HR: Performed by: INTERNAL MEDICINE

## 2021-07-13 PROCEDURE — 77030019580 HC CBL PACE MEDT -B: Performed by: INTERNAL MEDICINE

## 2021-07-13 PROCEDURE — 77030008683 HC TU ET CUF COVD -A: Performed by: ANESTHESIOLOGY

## 2021-07-13 PROCEDURE — 2709999900 HC NON-CHARGEABLE SUPPLY: Performed by: INTERNAL MEDICINE

## 2021-07-13 PROCEDURE — 85610 PROTHROMBIN TIME: CPT

## 2021-07-13 PROCEDURE — 77030012468 HC VLV BLEEDBK CNTRL ABBT -B: Performed by: INTERNAL MEDICINE

## 2021-07-13 PROCEDURE — 77030005401 HC CATH RAD ARRO -A: Performed by: ANESTHESIOLOGY

## 2021-07-13 PROCEDURE — 93656 COMPRE EP EVAL ABLTJ ATR FIB: CPT | Performed by: INTERNAL MEDICINE

## 2021-07-13 PROCEDURE — 00537 ANES CARDIAC EP PROCEDURES: CPT | Performed by: ANESTHESIOLOGY

## 2021-07-13 PROCEDURE — 77030008543 HC TBNG MON PRSS MRTM -A: Performed by: INTERNAL MEDICINE

## 2021-07-13 PROCEDURE — 93662 INTRACARDIAC ECG (ICE): CPT

## 2021-07-13 PROCEDURE — C1759 CATH, INTRA ECHOCARDIOGRAPHY: HCPCS | Performed by: INTERNAL MEDICINE

## 2021-07-13 PROCEDURE — 77030028698 HC BLD TISS PLSM MEDT -D: Performed by: INTERNAL MEDICINE

## 2021-07-13 PROCEDURE — C1893 INTRO/SHEATH, FIXED,NON-PEEL: HCPCS | Performed by: INTERNAL MEDICINE

## 2021-07-13 DEVICE — MONITOR CARD IMPLANT -- REVEAL LINQ ICM SYS: Type: IMPLANTABLE DEVICE | Status: FUNCTIONAL

## 2021-07-13 RX ORDER — SODIUM CHLORIDE 9 MG/ML
INJECTION, SOLUTION INTRAVENOUS
Status: DISCONTINUED | OUTPATIENT
Start: 2021-07-13 | End: 2021-07-13 | Stop reason: HOSPADM

## 2021-07-13 RX ORDER — DEXAMETHASONE SODIUM PHOSPHATE 4 MG/ML
INJECTION, SOLUTION INTRA-ARTICULAR; INTRALESIONAL; INTRAMUSCULAR; INTRAVENOUS; SOFT TISSUE AS NEEDED
Status: DISCONTINUED | OUTPATIENT
Start: 2021-07-13 | End: 2021-07-13 | Stop reason: HOSPADM

## 2021-07-13 RX ORDER — EPHEDRINE SULFATE/0.9% NACL/PF 50 MG/5 ML
SYRINGE (ML) INTRAVENOUS AS NEEDED
Status: DISCONTINUED | OUTPATIENT
Start: 2021-07-13 | End: 2021-07-13 | Stop reason: HOSPADM

## 2021-07-13 RX ORDER — METOPROLOL TARTRATE 25 MG/1
12.5 TABLET, FILM COATED ORAL EVERY 12 HOURS
Status: DISCONTINUED | OUTPATIENT
Start: 2021-07-13 | End: 2021-07-14 | Stop reason: HOSPADM

## 2021-07-13 RX ORDER — ZOLPIDEM TARTRATE 5 MG/1
10 TABLET ORAL
Status: DISCONTINUED | OUTPATIENT
Start: 2021-07-13 | End: 2021-07-14 | Stop reason: HOSPADM

## 2021-07-13 RX ORDER — SODIUM CHLORIDE 0.9 % (FLUSH) 0.9 %
5-40 SYRINGE (ML) INJECTION EVERY 8 HOURS
Status: DISCONTINUED | OUTPATIENT
Start: 2021-07-13 | End: 2021-07-13

## 2021-07-13 RX ORDER — SUCCINYLCHOLINE CHLORIDE 20 MG/ML
INJECTION INTRAMUSCULAR; INTRAVENOUS AS NEEDED
Status: DISCONTINUED | OUTPATIENT
Start: 2021-07-13 | End: 2021-07-13 | Stop reason: HOSPADM

## 2021-07-13 RX ORDER — MIDAZOLAM HYDROCHLORIDE 1 MG/ML
INJECTION, SOLUTION INTRAMUSCULAR; INTRAVENOUS AS NEEDED
Status: DISCONTINUED | OUTPATIENT
Start: 2021-07-13 | End: 2021-07-13 | Stop reason: HOSPADM

## 2021-07-13 RX ORDER — DILTIAZEM HYDROCHLORIDE 180 MG/1
180 CAPSULE, COATED, EXTENDED RELEASE ORAL DAILY
Status: DISCONTINUED | OUTPATIENT
Start: 2021-07-13 | End: 2021-07-14 | Stop reason: HOSPADM

## 2021-07-13 RX ORDER — OXYCODONE AND ACETAMINOPHEN 5; 325 MG/1; MG/1
1 TABLET ORAL
Status: DISCONTINUED | OUTPATIENT
Start: 2021-07-13 | End: 2021-07-14 | Stop reason: HOSPADM

## 2021-07-13 RX ORDER — SPIRONOLACTONE 25 MG/1
25 TABLET ORAL DAILY
Status: DISCONTINUED | OUTPATIENT
Start: 2021-07-13 | End: 2021-07-14 | Stop reason: HOSPADM

## 2021-07-13 RX ORDER — PROTAMINE SULFATE 10 MG/ML
INJECTION, SOLUTION INTRAVENOUS AS NEEDED
Status: DISCONTINUED | OUTPATIENT
Start: 2021-07-13 | End: 2021-07-13 | Stop reason: HOSPADM

## 2021-07-13 RX ORDER — ASPIRIN 81 MG/1
81 TABLET ORAL DAILY
Status: DISCONTINUED | OUTPATIENT
Start: 2021-07-13 | End: 2021-07-14 | Stop reason: HOSPADM

## 2021-07-13 RX ORDER — BRIMONIDINE TARTRATE 2 MG/ML
1 SOLUTION/ DROPS OPHTHALMIC 3 TIMES DAILY
Status: DISCONTINUED | OUTPATIENT
Start: 2021-07-13 | End: 2021-07-14 | Stop reason: HOSPADM

## 2021-07-13 RX ORDER — SODIUM CHLORIDE 0.9 % (FLUSH) 0.9 %
5-40 SYRINGE (ML) INJECTION AS NEEDED
Status: DISCONTINUED | OUTPATIENT
Start: 2021-07-13 | End: 2021-07-13

## 2021-07-13 RX ORDER — IRBESARTAN 150 MG/1
150 TABLET ORAL DAILY
Status: DISCONTINUED | OUTPATIENT
Start: 2021-07-13 | End: 2021-07-14 | Stop reason: HOSPADM

## 2021-07-13 RX ORDER — PANTOPRAZOLE SODIUM 40 MG/1
40 TABLET, DELAYED RELEASE ORAL
Status: DISCONTINUED | OUTPATIENT
Start: 2021-07-14 | End: 2021-07-14 | Stop reason: HOSPADM

## 2021-07-13 RX ORDER — HEPARIN SODIUM 1000 [USP'U]/ML
INJECTION, SOLUTION INTRAVENOUS; SUBCUTANEOUS AS NEEDED
Status: DISCONTINUED | OUTPATIENT
Start: 2021-07-13 | End: 2021-07-13 | Stop reason: HOSPADM

## 2021-07-13 RX ORDER — LIDOCAINE HYDROCHLORIDE 10 MG/ML
INJECTION, SOLUTION EPIDURAL; INFILTRATION; INTRACAUDAL; PERINEURAL AS NEEDED
Status: DISCONTINUED | OUTPATIENT
Start: 2021-07-13 | End: 2021-07-13 | Stop reason: HOSPADM

## 2021-07-13 RX ORDER — PROPOFOL 10 MG/ML
INJECTION, EMULSION INTRAVENOUS AS NEEDED
Status: DISCONTINUED | OUTPATIENT
Start: 2021-07-13 | End: 2021-07-13 | Stop reason: HOSPADM

## 2021-07-13 RX ORDER — FENTANYL CITRATE 50 UG/ML
INJECTION, SOLUTION INTRAMUSCULAR; INTRAVENOUS AS NEEDED
Status: DISCONTINUED | OUTPATIENT
Start: 2021-07-13 | End: 2021-07-13 | Stop reason: HOSPADM

## 2021-07-13 RX ORDER — ROSUVASTATIN CALCIUM 20 MG/1
20 TABLET, COATED ORAL
Status: DISCONTINUED | OUTPATIENT
Start: 2021-07-13 | End: 2021-07-14 | Stop reason: HOSPADM

## 2021-07-13 RX ADMIN — SODIUM CHLORIDE: 900 INJECTION, SOLUTION INTRAVENOUS at 08:00

## 2021-07-13 RX ADMIN — DILTIAZEM HYDROCHLORIDE 180 MG: 180 CAPSULE, COATED, EXTENDED RELEASE ORAL at 14:07

## 2021-07-13 RX ADMIN — PROPOFOL 150 MG: 10 INJECTION, EMULSION INTRAVENOUS at 08:08

## 2021-07-13 RX ADMIN — MIDAZOLAM HYDROCHLORIDE 1 MG: 2 INJECTION, SOLUTION INTRAMUSCULAR; INTRAVENOUS at 08:00

## 2021-07-13 RX ADMIN — Medication 81 MG: at 14:07

## 2021-07-13 RX ADMIN — METOPROLOL TARTRATE 12.5 MG: 25 TABLET, FILM COATED ORAL at 14:08

## 2021-07-13 RX ADMIN — Medication 10 MG: at 09:32

## 2021-07-13 RX ADMIN — FENTANYL CITRATE 25 MCG: 50 INJECTION, SOLUTION INTRAMUSCULAR; INTRAVENOUS at 08:15

## 2021-07-13 RX ADMIN — FENTANYL CITRATE 25 MCG: 50 INJECTION, SOLUTION INTRAMUSCULAR; INTRAVENOUS at 08:00

## 2021-07-13 RX ADMIN — DEXAMETHASONE SODIUM PHOSPHATE 4 MG: 4 INJECTION, SOLUTION INTRAMUSCULAR; INTRAVENOUS at 08:41

## 2021-07-13 RX ADMIN — SODIUM CHLORIDE 1 G: 900 INJECTION, SOLUTION INTRAVENOUS at 10:12

## 2021-07-13 RX ADMIN — RIVAROXABAN 20 MG: 20 TABLET, FILM COATED ORAL at 14:07

## 2021-07-13 RX ADMIN — HEPARIN SODIUM 14000 UNITS: 1000 INJECTION, SOLUTION INTRAVENOUS; SUBCUTANEOUS at 09:11

## 2021-07-13 RX ADMIN — Medication 10 MG: at 09:14

## 2021-07-13 RX ADMIN — FENTANYL CITRATE 25 MCG: 50 INJECTION, SOLUTION INTRAMUSCULAR; INTRAVENOUS at 10:15

## 2021-07-13 RX ADMIN — METOPROLOL TARTRATE 12.5 MG: 25 TABLET, FILM COATED ORAL at 21:31

## 2021-07-13 RX ADMIN — MIDAZOLAM HYDROCHLORIDE 1 MG: 2 INJECTION, SOLUTION INTRAMUSCULAR; INTRAVENOUS at 09:00

## 2021-07-13 RX ADMIN — FENTANYL CITRATE 25 MCG: 50 INJECTION, SOLUTION INTRAMUSCULAR; INTRAVENOUS at 09:39

## 2021-07-13 RX ADMIN — SPIRONOLACTONE 25 MG: 25 TABLET ORAL at 14:07

## 2021-07-13 RX ADMIN — IRBESARTAN 150 MG: 150 TABLET ORAL at 16:14

## 2021-07-13 RX ADMIN — ROSUVASTATIN CALCIUM 20 MG: 20 TABLET, COATED ORAL at 21:30

## 2021-07-13 RX ADMIN — SUCCINYLCHOLINE CHLORIDE 100 MG: 20 INJECTION, SOLUTION INTRAMUSCULAR; INTRAVENOUS at 08:08

## 2021-07-13 RX ADMIN — PROTAMINE SULFATE 50 MG: 10 INJECTION, SOLUTION INTRAVENOUS at 10:07

## 2021-07-13 RX ADMIN — PHENYLEPHRINE HYDROCHLORIDE 10 MCG/MIN: 10 INJECTION INTRAVENOUS at 08:20

## 2021-07-13 NOTE — Clinical Note
Bilateral groin prepped with ChloraPrep and draped. Wet prep solution applied at: 830. Wet prep solution dried at: 833. Wet prep elapsed drying time: 3 mins.

## 2021-07-13 NOTE — PROGRESS NOTES
1030: TRANSFER - IN REPORT:    Verbal report received from Latosha Farrell (name) on [facility]  being received from EP Lab(unit) for ordered procedure      Report consisted of patients Situation, Background, Assessment and   Recommendations(SBAR). Information from the following report(s) SBAR, Kardex, Procedure Summary, Intake/Output, MAR and Recent Results was reviewed with the receiving nurse. Opportunity for questions and clarification was provided. Assessment completed upon patients arrival to unit and care assumed.

## 2021-07-13 NOTE — PROGRESS NOTES
Observation notice provided in writing to patient and/or caregiver as well as verbal explanation of the policy. Patients who are in outpatient status also receive the Observation notice. Reason for Admission:  Afib ([facility] [address] [I48.91]  Syncope [R55]  A-fib ([facility] [address] [I48.91]                 RUR Score:    n/a            Plan for utilizing home health:    Not at this time. Likelihood of Readmission:   LOW                         Transition of Care Plan:              Initial assessment completed with patient and wife at the bedside. Cognitive status of patient: oriented to time, place, person and situation. Face sheet information confirmed:  yes. The patient designates his wife Daniel Simpson to participate in his discharge plan and to receive any needed information. This patient lives in a single family home with his wife. Patient is able to navigate steps as needed. Prior to hospitalization, patient was considered to be independent with ADLs/IADLS : yes . Patient has a current ACP document on file: no      Healthcare Decision Maker:   Primary Decision Maker: Markel Squires - Spouse - [phone]    Click here to complete [address] including selection of the Healthcare Decision Maker Relationship (ie \"Primary\")    The wife will be available to transport patient home upon discharge. The patient already has no medical equipment available in the home. Patient is not currently active with home health. Patient has not stayed in a skilled nursing facility or rehab. This patient is on dialysis :no    Currently, the discharge plan is home with his wife. The patient states that he can obtain his medications from the pharmacy, and take his medications as directed. Patient's current insurance is [email or web address]. Care Management Interventions  PCP Verified by CM: Yes  Mode of Transport at Discharge:  Other (see comment) (wife)  Transition of Care Consult (CM Consult): Discharge Planning  Physical Therapy Consult: No  Occupational Therapy Consult: No  Current Support Network: Lives with Spouse  Confirm Follow Up Transport: Family  Discharge Location  Discharge Placement: Home with family assistance        Elsie Garcia RN BSN  Care Manager  [phone]

## 2021-07-13 NOTE — PROGRESS NOTES
Problem: Falls - Risk of  Goal: *Absence of Falls  Description: Document Atul Moreland Fall Risk and appropriate interventions in the flowsheet.   Outcome: Progressing Towards Goal  Note: Fall Risk Interventions:            Medication Interventions: Teach patient to arise slowly                   Problem: Patient Education: Go to Patient Education Activity  Goal: Patient/Family Education  Outcome: Progressing Towards Goal

## 2021-07-13 NOTE — Clinical Note
TRANSFER - OUT REPORT:     Verbal report given to: TRACY Patel. Report consisted of patient's Situation, Background, Assessment and   Recommendations(SBAR). Opportunity for questions and clarification was provided. Patient transported with a Cardiac Cath Tech / Patient Care Tech. Patient transported to: holding area.

## 2021-07-13 NOTE — PROGRESS NOTES
Cath holding summary     Patient escorted to cath holding from waiting area ambulatory, alert and oriented x 4, voicing no complaints. Changed into gown and placed on monitor. NPO since MN. Lab results, med rec and H&P reviewed on chart. PIV x 2 inserted without difficulty. Family to bedside. 1042  9 and 6 Fr sheath pulled from L groin. Pressure held for 10 mins, no bleeding or hematoma. Quik clot and Tegaderm dressing applied. Dressing clean, dry, and intact. Pt education given on the need to continue lying flat. Will continue to monitor. 1053  14 and 8 Fr sheath pulled from R groin. Pressure held for 10 mins, no bleeding or hematoma. Quik clot and Tegaderm dressing applied. Dressing clean, dry, and intact. Pt education given on the need to continue lying flat. Will continue to monitor. 1245  TRANSFER - OUT REPORT:    Verbal [facility] c[facility] stepdown(unit) for routine progression of care       Report consisted of patients Situation, Background, Assessment and   Recommendations(SBAR). Information from the following report(s) SBAR, Kardex, Procedure Summary, MAR, Procedure Verification and Quality Measures was reviewed with the receiving nurse. Lines:   Peripheral IV 07/13/21 Distal;Right Antecubital (Active)   Site Assessment Clean, dry, & intact 07/13/21 0738   Phlebitis Assessment 0 07/13/21 0738   Infiltration Assessment 0 07/13/21 0738   Dressing Status Clean, dry, & intact; New 07/13/21 0738   Dressing Type Tape;Transparent 07/13/21 0738   Hub Color/Line Status Pink;Flushed 07/13/21 0737       Peripheral IV 07/13/21 Anterior; Left Hand (Active)   Site Assessment Clean, dry, & intact 07/13/21 0738   Phlebitis Assessment 0 07/13/21 0738   Infiltration Assessment 0 07/13/21 0738   Dressing Status New 07/13/21 0738   Dressing Type Tape;Transparent 07/13/21 0738   Hub Color/Line Status Pink;Flushed 07/13/21 0738       Arterial Line 07/13/21 Left Radial artery (Active)        Opportunity for questions and clarification was provided.       Patient transported with:   Registered Nurse

## 2021-07-13 NOTE — Clinical Note
under hemodynamic and ICE, utilizing a standard needle, Kevin 71cm via a guiding sheath. Needle inserted.

## 2021-07-13 NOTE — ANESTHESIA PREPROCEDURE EVALUATION
Relevant Problems   No relevant active problems       Anesthetic History     PONV          Review of Systems / Medical History  Patient summary reviewed, nursing notes reviewed and pertinent labs reviewed    Pulmonary  Within defined limits                 Neuro/Psych             Comments: 04/2021 Bilateral less than 50% stenosis of the internal carotid arteries Cardiovascular    Hypertension  Valvular problems/murmurs: aortic insufficiency      Dysrhythmias : atrial fibrillation  CAD, cardiac stents and hyperlipidemia      Comments: 04/2021 ECHO  Estimated LVEF is 55 - 60%. Mild aortic valve regurgitation.    GI/Hepatic/Renal     GERD    Renal disease: CRI       Endo/Other             Other Findings              Physical Exam    Airway  Mallampati: III  TM Distance: 4 - 6 cm  Neck ROM: normal range of motion   Mouth opening: Normal     Cardiovascular    Rhythm: irregular           Dental    Dentition: Lower dentition intact and Upper dentition intact     Pulmonary  Breath sounds clear to auscultation               Abdominal  GI exam deferred       Other Findings            Anesthetic Plan    ASA: 3  Anesthesia type: general    Monitoring Plan: Arterial line        Anesthetic plan and risks discussed with: Patient

## 2021-07-13 NOTE — ANESTHESIA POSTPROCEDURE EVALUATION
Procedure(s):  ABLATION A-FIB  W COMPLETE EP STUDY/KEEGAN PRIOR  LOOP RECORDER INSERT  Intravascular Ultrasound. general    Anesthesia Post Evaluation      Multimodal analgesia: multimodal analgesia used between 6 hours prior to anesthesia start to PACU discharge  Patient location during evaluation: PACU  Patient participation: complete - patient participated  Level of consciousness: awake and alert  Pain management: adequate  Airway patency: patent  Anesthetic complications: no  Cardiovascular status: acceptable and hemodynamically stable  Respiratory status: acceptable  Hydration status: acceptable  Post anesthesia nausea and vomiting:  controlled      INITIAL Post-op Vital signs:   Vitals Value Taken Time   /66 07/13/21 1123   Temp 35.7 °C (96.2 °F) 07/13/21 1033   Pulse 66 07/13/21 1133   Resp 12 07/13/21 1133   SpO2 99 % 07/13/21 1133   Vitals shown include unvalidated device data.

## 2021-07-13 NOTE — Clinical Note
Left chest prepped with ChloraPrep and draped. Wet prep solution applied at: 1009. Wet prep solution dried at: 1011. Wet prep elapsed drying time: 2 mins.

## 2021-07-13 NOTE — ANESTHESIA PROCEDURE NOTES
Arterial Line Placement    Start time: 7/13/2021 8:20 AM  End time: 7/13/2021 8:27 AM  Performed by: Jomar Matias MD  Authorized by: Jomar Matias MD     Pre-Procedure  Indications:  Blood sampling and arterial pressure monitoring  Preanesthetic Checklist: patient identified, risks and benefits discussed, anesthesia consent, site marked, patient being monitored, timeout performed and patient being monitored    Timeout Time: 08:20 EDT        Procedure:   Prep:  Alcohol  Seldinger Technique?: Yes    Orientation:  Left  Location:  Radial artery  Catheter size:  20 G  Number of attempts:  1  Cont Cardiac Output Sensor: No      Assessment:   Post-procedure:  Line secured and sterile dressing applied  Patient Tolerance:  Patient tolerated the procedure well with no immediate complications

## 2021-07-14 VITALS
SYSTOLIC BLOOD PRESSURE: 133 MMHG | DIASTOLIC BLOOD PRESSURE: 74 MMHG | HEART RATE: 68 BPM | RESPIRATION RATE: 18 BRPM | WEIGHT: 224.5 LBS | BODY MASS INDEX: 30.41 KG/M2 | OXYGEN SATURATION: 98 % | HEIGHT: 72 IN | TEMPERATURE: 98.4 F

## 2021-07-14 PROCEDURE — 99217 PR OBSERVATION CARE DISCHARGE MANAGEMENT: CPT | Performed by: PHYSICIAN ASSISTANT

## 2021-07-14 PROCEDURE — 74011000250 HC RX REV CODE- 250: Performed by: INTERNAL MEDICINE

## 2021-07-14 PROCEDURE — 74011250637 HC RX REV CODE- 250/637: Performed by: INTERNAL MEDICINE

## 2021-07-14 PROCEDURE — 99218 HC RM OBSERVATION: CPT

## 2021-07-14 RX ADMIN — METOPROLOL TARTRATE 12.5 MG: 25 TABLET, FILM COATED ORAL at 08:17

## 2021-07-14 RX ADMIN — Medication 81 MG: at 08:17

## 2021-07-14 RX ADMIN — DILTIAZEM HYDROCHLORIDE 180 MG: 180 CAPSULE, COATED, EXTENDED RELEASE ORAL at 08:18

## 2021-07-14 RX ADMIN — RIVAROXABAN 20 MG: 20 TABLET, FILM COATED ORAL at 08:16

## 2021-07-14 RX ADMIN — BRIMONIDINE TARTRATE 1 DROP: 2 SOLUTION OPHTHALMIC at 08:18

## 2021-07-14 RX ADMIN — SPIRONOLACTONE 25 MG: 25 TABLET ORAL at 08:17

## 2021-07-14 RX ADMIN — PANTOPRAZOLE 40 MG: 40 TABLET, DELAYED RELEASE ORAL at 07:30

## 2021-07-14 RX ADMIN — IRBESARTAN 150 MG: 150 TABLET ORAL at 08:16

## 2021-07-14 NOTE — PROGRESS NOTES
Problem: Falls - Risk of  Goal: *Absence of Falls  Description: Document [place] Fall Risk and appropriate interventions in the flowsheet.   7/14/2021 0404 by Shailesh Villeda RN  Outcome: Progressing Towards Goal  Note: Fall Risk Interventions:            Medication Interventions: Teach patient to arise slowly                7/14/2021 0358 by Shailesh Villeda RN  Outcome: Progressing Towards Goal  Note: Fall Risk Interventions:            Medication Interventions: Teach patient to arise slowly

## 2021-07-14 NOTE — PROGRESS NOTES
Discharge order noted for today. Orders reviewed. Cardiology follow up scheduled. No additional needs identified at this time.  remains available if needed. Pt to be transported home by his wife.     Angélica Herrera RN BSN  Care Manager  [phone]

## 2021-07-14 NOTE — DISCHARGE SUMMARY
Cardiology Discharge Summary      Discharge Summary     Patient: Stefany Wakefield MRN: [id number]  SSN: [id number]    YOB: 1950  Age: 79 y.o.   Sex: male       Admit Date: 7/13/2021    Discharge Date: 7/14/2021      Admission Diagnoses: Afib ([facility] [I48.91]  Syncope [R55]  [facility]) [I48.91]    Discharge Diagnoses:   Problem List as of 7/14/2021 Date Reviewed: 5/19/2021        Codes Class Noted - Resolved    [facility]) ICD-10-CM: I48.91  ICD-9-CM: 427.31  7/13/2021 - Present        Sick sinus syndrome ([facility] ICD-10-CM: I49.5  ICD-9-CM: 427.81  4/8/2021 - Present        Bradycardia ICD-10-CM: R00.1  ICD-9-CM: 427.89  4/7/2021 - Present        Hypotension ICD-10-CM: I95.9  ICD-9-CM: 458.9  4/7/2021 - Present        Acute renal failure superimposed on stage 3 chronic kidney disease ([facility] ICD-10-CM: N17.9, N18.30  ICD-9-CM: 584.9, 585.3  4/7/2021 - Present        Atrial fibrillation ([facility] ICD-10-CM: I48.91  ICD-9-CM: 427.31  11/24/2017 - Present        Dyspnea ICD-10-CM: R06.00  ICD-9-CM: 786.09  11/24/2017 - Present        Diastolic CHF, acute on chronic (HCC) ICD-10-CM: I50.33  ICD-9-CM: 428.33, 428.0  11/24/2017 - Present        Elevated troponin ICD-10-CM: R77.8  ICD-9-CM: 790.6  11/24/2017 - Present        CAD (coronary artery disease) ICD-10-CM: I25.10  ICD-9-CM: 414.00  12/3/2015 - Present        Diastolic CHF, chronic (HCC) ICD-10-CM: I50.32  ICD-9-CM: 428.32, 428.0  12/3/2015 - Present        NSTEMI (non-ST elevated myocardial infarction) ([facility] ICD-10-CM: I21.4  ICD-9-CM: 410.70  12/2/2015 - Present        Cardiomyopathy ([facility] ICD-10-CM: I42.9  ICD-9-CM: 425.4  1/27/2015 - Present        GERD (gastroesophageal reflux disease) ICD-10-CM: K21.9  ICD-9-CM: 530.81  1/27/2015 - Present        CHF (congestive heart failure) (HCC) ICD-10-CM: I50.9  ICD-9-CM: 428.0  1/7/2015 - Present        CHF exacerbation ([facility] ICD-10-CM: I50.9  ICD-9-CM: 428.0  1/7/2015 - Present        Calculus of kidney ICD-10-CM: N20.0  ICD-9-CM: 592.0  5/7/2013 - Present        Near syncope ICD-10-CM: R55  ICD-9-CM: 780.2  5/6/2013 - Present        UTI (urinary tract infection) ICD-10-CM: N39.0  ICD-9-CM: 599.0  5/6/2013 - Present        Essential hypertension, benign ICD-10-CM: I10  ICD-9-CM: 401.1  5/6/2013 - Present        Esophageal reflux ICD-10-CM: K21.9  ICD-9-CM: 530.81  5/6/2013 - Present        RESOLVED: Spinal stenosis of cervical region ICD-10-CM: M48.02  ICD-9-CM: 723.0  4/30/2012 - 5/3/2012               Discharge Condition: Good    Hospital Course: Patient with paroxysmal atrial fibrillation presented for elective atrial fibrillation ablation and loop recorder placement. Patient tolerated procedure well. Consults: None    Significant Diagnostic Studies:     PROCEDURE   -Atrial fibrillation pulmonary vein isolation ablation using [employer].  -Placement of subcutaneous loop recorder to monitor for bradycardia/a.fib recurrence given previous syncope. -Cardiac electrophysiology study.   -Trans-esophageal echocardiogram prior to ablation without left atrial appendage thrombus, please see separate full report. -Transseptal puncture x 1.  -Intracardiac echocardiography.   -Continuous arterial monitoring with placement of left radial arterial sheath by anesthesia. -Pacing and sensing from the right atrium and left atrium via the coronary sinus.   -Placement of coronary sinus and His catheters. -General endotracheal intubation with anesthesia. -Continuous esophageal temperature monitoring.   -Phrenic nerve pacing during isolation of the right pulmonary veins.     Disposition: home    Discharge Medications:      My Medications      CONTINUE taking these medications      Instructions Each Dose to Equal Morning Noon Evening Bedtime   Alphagan P 0.1 % ophthalmic solution  Generic drug: brimonidine    Your last dose was:      Your next dose is:                         aspirin delayed-release 81 mg tablet    Your last dose was: Your next dose is: Take  by mouth daily. Crestor 20 mg tablet  Generic drug: rosuvastatin    Your last dose was: Your next dose is: Take 20 mg by mouth nightly. 20 mg                 dilTIAZem  mg capsule  Commonly known as: CARDIZEM CD    Your last dose was: Your next dose is:         TAKE 1 CAPSULE BY ORAL ROUTE EVERY DAY                  irbesartan 150 mg tablet  Commonly known as: AVAPRO    Your last dose was: Your next dose is:                         metoprolol tartrate 25 mg tablet  Commonly known as: LOPRESSOR    Your last dose was: Your next dose is: Take 0.5 Tabs by mouth every twelve (12) hours. 12.5 mg                 PriLOSEC 40 mg capsule  Generic drug: omeprazole    Your last dose was: Your next dose is: Take 40 mg by mouth daily. 40 mg                 rivaroxaban 20 mg Tab tablet  Commonly known as: Carin Padilla    Your last dose was: Your next dose is: Take 1 Tab by mouth daily (with dinner). 20 mg                 spironolactone 25 mg tablet  Commonly known as: ALDACTONE    Your last dose was: Your next dose is: Take  by mouth daily. Vitamin D3 50 mcg (2,000 unit) Tab  Generic drug: cholecalciferol (vitamin D3)    Your last dose was: Your next dose is: Take  by mouth. ZyrTEC 10 mg Cap  Generic drug: Cetirizine    Your last dose was: Your next dose is: Take 10 mg by mouth. 10 mg                        Activity: Activity as directed  Diet: Cardiac Diet  Wound Care: As directed    Follow-up Appointments   Procedures    FOLLOW UP VISIT Appointment in: [place] (Andrew Schuler) 6-8 Weeks Dr. Monika Simpson     6-8 Weeks Dr. Monika Simpson     Standing Status:   Standing     Number of Occurrences:   1     Order Specific Question:   Appointment in     Answer:    Other (Specify)       Signed By: FRANC Sauceda     July 14, 2021

## 2021-07-14 NOTE — PROGRESS NOTES
1900 - [facility]side [facility] by Virginie Quiroz (offgoing nurse). Report included the following information SBAR, Kardex, ED Summary, OR Summary, Procedure Summary, Intake/Output, MAR, Recent Results and Cardiac Rhythm SR.     0700 - Bedside shift change report given to Virginie Quiroz (oncoming nurse) [facility]  Report included the following information SBAR, Kardex, ED Summary, OR Summary, Procedure Summary, Intake/Output, MAR, Recent Results and Cardiac Rhythm SR.

## 2021-07-14 NOTE — DISCHARGE INSTRUCTIONS
Disposition:  When discharged to home will need follow-up in my office 4 weeks. Please call my office at [phone] if any questions or concerns. Restrictions:  No driving for at least 24 hours after surgery. No heavy lifting > 10 lbs or prolonged standing, walking, or running x 1 week. OK to shower today over incision and remove dressing. Monitor groin for any signs of bleeding and please contact office at [phone] if any issues. There may be some mild bruising which is not unusual.  If any new symptoms develop, such as chest pain, dyspnea, dizziness, please contact office at [phone] immediately. ABLATION DISCHARGE INSTRUCTIONS    It is normal to feel tired the first couple days. Take it easy and follow the physicians instructions. CHECK THE CATHETER INSERTION SITE DAILY:  You may shower 24 hours after the procedure, remove the bandage during showering. Wash with soap and water and pat dry. Gentle cleaning of the site with soap and water is sufficient, cover with a dry clean dressing or bandage. Do not apply creams or powders to the area. Do not sit in a bathtub or pool of water for 7 days or until wound has completely healed. Temporary bruising and discomfort is normal and may last a few weeks. You may have a  formation of a small lump at the site which may last up to 6 weeks. CALL THE PHYSICIAN:  If the site becomes red, swollen or feels warm to the touch  If there is bleeding or drainage or if there is unusual pain at the groin or down the leg. If there is any bleeding, lie down, apply pressure or have someone apply pressure with a clean cloth until the bleeding stops. If the bleeding continues, call 911 to be transported to the hospital.  DO NOT DRIVE YOURSELF, [facility] Activity:      For the first 24-48 hours or as instructed by the physician:  No lifting, pushing or pulling over 10 pounds and no straining the insertion site.  Do not life grocery bags or the garbage can, do not run the vacuum  or  for 7 days. Start with short walks as in the hospital and gradually increase as tolerated each day. It is recommended to walk 30 minutes 5-7 days per week. Follow your physicians instructions on activity. Avoid walking outside in extremes of heat or cold. Walk inside when it is cold and windy or hot and humid. Things to keep in mind:  No driving for at least 24 hours, or as designated by your physician. Limit the number of times you go up and down the stairs  Take rests and pace yourself with activity. Be careful and do not strain with bowel movements. Medications: Take all medications as prescribed  Call your physician if you have any questions  Keep an updated list of your medications with you at all times and give a list to your physician and pharmacist    Signs and Symptoms:  Be cautious of symptoms of angina or recurrent symptoms such as chest discomfort, unusual shortness of breath or fatigue, palpitations. After Care: Follow up with your physician as instructed. Follow a heart healthy diet with proper portion control, daily stress management, daily exercise, blood pressure and cholesterol control , and smoking cessation. Patient Education        Learning About Atrial Fibrillation  What is atrial fibrillation? Atrial fibrillation (say \"AY-tree-mathew ayy-lznn-KSF-un\") is a common type of irregular heartbeat (arrhythmia). Normally, the heart beats in a strong, steady rhythm. In atrial fibrillation, a problem with the heart's electrical system causes the two upper chambers of the heart (called the atria) to quiver, or fibrillate. Atrial fibrillation can be dangerous. This is because if the heartbeat isn't strong and steady, blood can collect, or pool, in the atria. And pooled blood is more likely to form clots. Clots can travel to the brain, block blood flow, and cause a stroke.  Atrial fibrillation can also lead to heart failure. This condition also upsets the normal rhythm between the atria and the lower chambers of the heart. (These chambers are called the ventricles.) The ventricles may beat fast and without a regular rhythm. What are the symptoms? Some people feel symptoms when they have episodes of atrial fibrillation. But other people don't notice any symptoms. If you have symptoms, you may feel:  · A fluttering, racing, or pounding feeling in your chest called palpitations. · Weak or tired. · Dizzy or lightheaded. · Short of breath. · Chest pain. · Confused. You may notice signs of atrial fibrillation when you check your pulse. Your pulse may seem uneven or fast.  What can you expect when you have atrial fibrillation? At first, spells of atrial fibrillation may come on suddenly and last a short time. They may go away on their own or with treatment. Over time, the spells may last longer and occur more often. They often don't go away on their own. How is it treated? Treatments can help you feel better and prevent future problems, especially stroke and heart failure. Your treatment will depend on the cause of your atrial fibrillation, your symptoms, and your risk for stroke. Types of treatment include:  · Heart rate treatment. Medicine may be used to slow your heart rate. Your heartbeat may still be irregular. But these medicines keep your heart from beating too fast. They may also help relieve symptoms. · Heart rhythm treatment. Different treatments may be used to try to stop atrial fibrillation and keep it from returning. They can also relieve symptoms. These treatments include medicine, electrical cardioversion to shock the heart back to a normal rhythm, a procedure called catheter ablation, and heart surgery. · Stroke prevention. You and your doctor can decide how to lower your risk. You may decide to take a blood-thinning medicine called an anticoagulant.   What is a heart-healthy lifestyle for atrial fibrillation? You can live well and help manage atrial fibrillation by having a heart-healthy lifestyle. This lifestyle may help reduce how often you have episodes of atrial fibrillation. Don't smoke. Avoid secondhand smoke too. Quitting smoking is the best thing you can do for your heart. Be active. Talk to your doctor about what type and level of exercise is safe for you. Eat a heart-healthy diet. These foods include vegetables, fruits, nuts, beans, lean meat, fish, and whole grains. Limit sodium, alcohol, and sugar. Avoid alcohol if it triggers symptoms. Stay at a healthy weight. Lose weight if you need to. Losing weight can help relieve symptoms. Manage other health problems. These problems include diabetes, high blood pressure, and high cholesterol. If you think you may have a problem with alcohol or drug use, talk to your doctor. Manage stress. Options like yoga, biofeedback, and meditation may help. Where can you learn more? Go to http://www.gray.com/  Enter L274 in the search box to learn more about \"Learning About Atrial Fibrillation. \"  Current as of: August 31, 2020               Content Version: 12.8  © 2371-6304 [employer]. Care instructions adapted under license by [employer] (which disclaims liability or warranty for this information). If you have questions about a medical condition or this instruction, always ask your healthcare professional. [address] any warranty or liability for your use of this information. Patient Education        Electrophysiology Study and Catheter Ablation: What to Expect at [address] [place] [address] Drive had an electrophysiology study for a problem with your heartbeat. You may also have had a catheter ablation to try to correct the problem. You may have swelling, bruising, or a small lump around the site where the catheters went into your body.  These should go away in 3 to 4 weeks. Do not exercise hard or lift anything heavy for a week. You may be able to go back to work and to your normal routine in 1 or 2 days. This care sheet gives you a general idea about how long it will take for you to recover. But each person recovers at a different pace. Follow the steps below to get better as quickly as possible. How can you care for yourself at home? Activity    · For 1 week, do not lift anything that would make you strain. This may include heavy grocery bags and milk containers, a heavy briefcase or backpack, cat litter or dog food bags, a vacuum , or a child.     · For 1 week, do not exercise hard or do any activity that could strain your blood vessels or the site where the catheters went into your body.     · Ask your doctor when it is okay to have sex. Diet    · You can eat your normal diet. If your stomach is upset, try bland, low-fat foods like plain rice, broiled chicken, toast, and yogurt.     · Drink plenty of fluids (unless your doctor tells you not to). Medicines    · Your doctor will tell you if and when you can restart your medicines. He or she will also give you instructions about taking any new medicines.     · If you take aspirin or some other blood thinner, ask your doctor if and when to start taking it again. Make sure that you understand exactly what your doctor wants you to do.     · Ask your doctor if you can take acetaminophen (Tylenol) for pain. Do not take aspirin for 3 days, unless your doctor says it is okay.     · Check with your doctor before you take aspirin or anti-inflammatory medicines to reduce pain and swelling. These include ibuprofen (Advil, Motrin) and naproxen (Aleve).   · Make sure you know which heart medicines to continue and which ones to stop. Ask your doctor if you aren't sure.    Catheter site care    · You can remove your bandages the day after the procedure.     · You may shower 24 to 48 hours after the procedure, if your doctor okays it. Pat the incision dry.     · Do not soak the catheter site until it is healed. Don't take a bath for 1 week, or until your doctor tells you it is okay.     · Watch for bleeding from the site. A small amount of blood (up to the size of a quarter) on the bandage can be normal.     · If you are bleeding, lie down and press on the area for 15 minutes to try to make it stop. If the bleeding does not stop, call your doctor or seek immediate medical care. Follow-up care is a key part of your treatment and safety. Be sure to make and go to all appointments, and call your doctor if you are having problems. It's also a good idea to know your test results and keep a list of the medicines you take. When should you call for help? Call  911 anytime you think you may need emergency care. For example, call if:    · You passed out (lost consciousness).     · You have symptoms of a heart attack. These may include:  ? Chest pain or pressure, or a strange feeling in the chest.  ? Sweating. ? Shortness of breath. ? Nausea or vomiting. ? Pain, pressure, or a strange feeling in the back, neck, jaw, or upper belly, or in one or both shoulders or arms. ? Lightheadedness or sudden weakness. ? A fast or irregular heartbeat. After you call 911, the  may tell you to chew 1 adult-strength or 2 to 4 low-dose aspirin. Wait for an ambulance. Do not try to drive yourself.     · You have symptoms of a stroke. These may include:  ? Sudden numbness, tingling, weakness, or loss of movement in your face, arm, or leg, especially on only one side of your body. ? Sudden vision changes. ? Sudden trouble speaking. ? Sudden confusion or trouble understanding simple statements. ? Sudden problems with walking or balance. ? A sudden, severe headache that is different from past headaches.    Call your doctor now or seek immediate medical care if:    · You are bleeding from the area where the catheter was put in your blood vessel.     · You have a fast-growing, painful lump at the catheter site.     · You have signs of infection, such as:  ? Increased pain, swelling, warmth, or redness. ? Red streaks leading from the catheter site. ? Pus draining from the catheter site. ? A fever.     · Your leg, arm, or hand is painful, looks blue, or feels cold, numb, or tingly. Watch closely for any changes in your health, and be sure to contact your doctor if you have any problems. Where can you learn more? Go to http://www.gray.com/  Enter H580 in the search box to learn more about \"Electrophysiology Study and Catheter Ablation: What to Expect at Home. \"  Current as of: August 31, 2020               Content Version: 12.8  © 2006-2021 [employer]. Care instructions adapted under license by [employer] (which disclaims liability or warranty for this information). If you have questions about a medical condition or this instruction, always ask your healthcare professional. [place] [address] any warranty or liability for your use of this information.

## 2021-07-14 NOTE — PROGRESS NOTES
conducted an initial consultation and Spiritual Assessment for Chelsi Shelby, who is a 79 y.o.,male. Patients Primary Language is: [place]. According to the patients EMR Church Affiliation is: Hol[employer]. The reason the Patient came to the hospital is:   Patient Active Problem List    Diagnosis Date Noted    A-fib [facility]) 07/13/2021    Sick sinus syndrome ([address] 04/08/2021    Bradycardia 04/07/2021    Hypotension 04/07/2021    Acute renal failure superimposed on stage 3 chronic kidney disease ([address] 04/07/2021    Atrial fibrillation ([address] 11/24/2017    Dyspnea 59/73/4280    Diastolic CHF, acute on chronic (HCC) 11/24/2017    Elevated troponin 11/24/2017    CAD (coronary artery disease) 13/27/9746    Diastolic CHF, chronic ([address] 12/03/2015    NSTEMI (non-ST elevated myocardial infarction) ([address] 12/02/2015    Cardiomyopathy ([address] 01/27/2015    GERD (gastroesophageal reflux disease) 01/27/2015    CHF (congestive heart failure) ([address] 01/07/2015    CHF exacerbation ([address] 01/07/2015    Calculus of kidney 05/07/2013    Near syncope 05/06/2013    UTI (urinary tract infection) 05/06/2013    Essential hypertension, benign 05/06/2013    Esophageal reflux 05/06/2013        The  provided the following Interventions:  Initiated a relationship of care and support. Explored issues of dinora, spirituality and/or Yarsani needs while hospitalized. Listened empathically. Provided chaplaincy education. Provided information about Spiritual Care Services. Offered prayer and assurance of continued prayers on patient's behalf. Chart reviewed. The following outcomes were achieved:  Patient shared some information about their medical narrative and spiritual journey/beliefs. Patient processed feeling about current hospitalization. Patient expressed gratitude for the 's visit.     Assessment:  Patient did not indicate any spiritual or Yarsani issues which require Spiritual Care Services interventions at this time. Patient does not have any Baptist/cultural needs that will affect patients preferences in health care. Plan:  Chaplains will continue to follow and will provide pastoral care on an as needed or requested basis.  recommends bedside caregivers page  on duty if patient shows signs of acute spiritual or emotional distress. Rev. Yue Jackson.  [facility], [address] [facility] [place] [phone]  Pager :[phone]

## 2021-07-20 ENCOUNTER — HOSPITAL ENCOUNTER (OUTPATIENT)
Dept: INFUSION THERAPY | Age: 71
Discharge: HOME | End: 2021-07-20
Payer: MEDICARE

## 2021-07-20 VITALS
HEART RATE: 66 BPM | OXYGEN SATURATION: 98 % | TEMPERATURE: 96.7 F | DIASTOLIC BLOOD PRESSURE: 64 MMHG | SYSTOLIC BLOOD PRESSURE: 104 MMHG | RESPIRATION RATE: 18 BRPM

## 2021-07-20 LAB
BASO+EOS+MONOS # BLD AUTO: 1.1 K/UL (ref 0–2.3)
BASO+EOS+MONOS NFR BLD AUTO: 15 % (ref 0.1–17)
DIFFERENTIAL METHOD BLD: NORMAL
ERYTHROCYTE [DISTWIDTH] IN BLOOD BY AUTOMATED COUNT: 12.8 % (ref 11.5–14.5)
HCT VFR BLD AUTO: 44.2 % (ref 36–48)
HGB BLD-MCNC: 15.1 G/DL (ref 12–16)
LYMPHOCYTES # BLD: 1.6 K/UL (ref 1.1–5.9)
LYMPHOCYTES NFR BLD: 20 % (ref 14–44)
MCH RBC QN AUTO: 32.1 PG (ref 25–35)
MCHC RBC AUTO-ENTMCNC: 34.2 G/DL (ref 31–37)
MCV RBC AUTO: 93.8 FL (ref 78–102)
NEUTS SEG # BLD: 5.2 K/UL (ref 1.8–9.5)
NEUTS SEG NFR BLD: 66 % (ref 40–70)
PLATELET # BLD AUTO: 192 K/UL (ref 140–440)
RBC # BLD AUTO: 4.71 M/UL (ref 4.1–5.1)
WBC # BLD AUTO: 7.9 K/UL (ref 4.5–13)

## 2021-07-20 PROCEDURE — 36415 COLL VENOUS BLD VENIPUNCTURE: CPT

## 2021-07-20 PROCEDURE — 85025 COMPLETE CBC W/AUTO DIFF WBC: CPT

## 2021-07-20 NOTE — PROGRESS NOTES
[facility] Progress Note    Date: 2021    Name: Shiva Coley    MRN: [id number]         : 1950      Mr. Geri Claude arrived to [facility] at [phone] to see if he needs Therapeutic phlebotomy . Educated patient the purpose for the treatment, potential side effects. Pt verbalized understanding. Pt has recently had surgery for his afib regulator. Feeling much better, dizziness improving     Mr. Geri Claude was assessed and education was provided. Mr. Flores's vitals were reviewed. Visit Vitals  /64 (BP 1 Location: Left arm)   Pulse 66   Temp (!) 96.7 °F (35.9 °C)   Resp 18   SpO2 98%       Blood drawn for labs via Left hand right, condition patent and no redness venipuncture x1 attempt using a 24 G needle, brisk blood return verified. Collected cbc    Lab results were obtained and reviewed. Recent Results (from the past 12 hour(s))   CBC WITH 3 PART DIFF    Collection Time: 21 11:05 AM   Result Value Ref Range    WBC 7.9 4.5 - 13.0 K/uL    RBC 4.71 4.10 - 5.10 M/uL    HGB 15.1 12.0 - 16.0 g/dL    HCT 44.2 36 - 48 %    MCV 93.8 78 - 102 FL    MCH 32.1 25.0 - 35.0 PG    MCHC 34.2 31 - 37 g/dL    RDW 12.8 11.5 - 14.5 %    PLATELET 085 963 - 553 K/uL    NEUTROPHILS 66 40 - 70 %    MIXED CELLS 15 0.1 - 17 %    LYMPHOCYTES 20 14 - 44 %    ABS. NEUTROPHILS 5.2 1.8 - 9.5 K/UL    ABS. MIXED CELLS 1.1 0.0 - 2.3 K/uL    ABS. LYMPHOCYTES 1.6 1.1 - 5.9 K/UL    DF AUTOMATED         Hgb 15.1 and Hct 44.2  These results are normal for patients diagnosis, this is why he is being seen in the clinic today. However the labs do not support the need for therapeutic phlebotomy. Explained to patient who verbalized understanding. IV removed/ intact. Gauze/ coban to site.     Mr. Geri Claude was discharged from [place] [address] in stable condition at 1125  He is to return on 2021 at 900 for his next appointment for labs and potential therapeutic phlebotomy     Esther Peterson RN  2021

## 2021-07-26 LAB — CREAT UR-MCNC: 2.3 MG/DL (ref 0.6–1.3)

## 2021-08-03 PROBLEM — I48.91 ATRIAL FIBRILLATION (HCC): Status: RESOLVED | Noted: 2017-11-24 | Resolved: 2021-08-03

## 2021-08-17 ENCOUNTER — HOSPITAL ENCOUNTER (OUTPATIENT)
Dept: INFUSION THERAPY | Age: 71
End: 2021-08-17

## 2021-08-18 ENCOUNTER — HOSPITAL ENCOUNTER (OUTPATIENT)
Dept: INFUSION THERAPY | Age: 71
Discharge: HOME | End: 2021-08-18
Payer: MEDICARE

## 2021-08-18 VITALS
HEART RATE: 60 BPM | RESPIRATION RATE: 16 BRPM | OXYGEN SATURATION: 99 % | DIASTOLIC BLOOD PRESSURE: 57 MMHG | SYSTOLIC BLOOD PRESSURE: 132 MMHG | TEMPERATURE: 97.3 F

## 2021-08-18 LAB
BASOPHILS # BLD: 0 K/UL (ref 0–0.1)
BASOPHILS NFR BLD: 1 % (ref 0–2)
DIFFERENTIAL METHOD BLD: ABNORMAL
EOSINOPHIL # BLD: 0.2 K/UL (ref 0–0.4)
EOSINOPHIL NFR BLD: 4 % (ref 0–5)
ERYTHROCYTE [DISTWIDTH] IN BLOOD BY AUTOMATED COUNT: 12.7 % (ref 11.6–14.5)
HCT VFR BLD AUTO: 41.7 % (ref 36–48)
HGB BLD-MCNC: 14.5 G/DL (ref 13–16)
LYMPHOCYTES # BLD: 1.2 K/UL (ref 0.9–3.6)
LYMPHOCYTES NFR BLD: 19 % (ref 21–52)
MCH RBC QN AUTO: 32 PG (ref 24–34)
MCHC RBC AUTO-ENTMCNC: 34.8 G/DL (ref 31–37)
MCV RBC AUTO: 92.1 FL (ref 74–97)
MONOCYTES # BLD: 0.6 K/UL (ref 0.05–1.2)
MONOCYTES NFR BLD: 10 % (ref 3–10)
NEUTS SEG # BLD: 4.1 K/UL (ref 1.8–8)
NEUTS SEG NFR BLD: 66 % (ref 40–73)
PLATELET # BLD AUTO: 164 K/UL (ref 135–420)
PMV BLD AUTO: 11.8 FL (ref 9.2–11.8)
RBC # BLD AUTO: 4.53 M/UL (ref 4.35–5.65)
WBC # BLD AUTO: 6.1 K/UL (ref 4.6–13.2)

## 2021-08-18 PROCEDURE — 85025 COMPLETE CBC W/AUTO DIFF WBC: CPT

## 2021-08-18 PROCEDURE — 36415 COLL VENOUS BLD VENIPUNCTURE: CPT

## 2021-09-02 ENCOUNTER — CLINICAL SUPPORT (OUTPATIENT)
Dept: CARDIOLOGY CLINIC | Age: 71
End: 2021-09-02

## 2021-09-02 ENCOUNTER — OFFICE VISIT (OUTPATIENT)
Dept: CARDIOLOGY CLINIC | Age: 71
End: 2021-09-02
Payer: MEDICARE

## 2021-09-02 VITALS
DIASTOLIC BLOOD PRESSURE: 70 MMHG | HEIGHT: 72 IN | HEART RATE: 77 BPM | WEIGHT: 229 LBS | OXYGEN SATURATION: 98 % | BODY MASS INDEX: 31.02 KG/M2 | SYSTOLIC BLOOD PRESSURE: 110 MMHG

## 2021-09-02 DIAGNOSIS — I48.91 ATRIAL FIBRILLATION, UNSPECIFIED TYPE (HCC): Primary | ICD-10-CM

## 2021-09-02 DIAGNOSIS — I49.5 SICK SINUS SYNDROME (HCC): Primary | ICD-10-CM

## 2021-09-02 DIAGNOSIS — Z95.818 STATUS POST PLACEMENT OF IMPLANTABLE LOOP RECORDER: ICD-10-CM

## 2021-09-02 DIAGNOSIS — Z79.01 ON RIVAROXABAN THERAPY: ICD-10-CM

## 2021-09-02 DIAGNOSIS — Z98.890 HISTORY OF CARDIOVERSION: ICD-10-CM

## 2021-09-02 DIAGNOSIS — I48.11 LONGSTANDING PERSISTENT ATRIAL FIBRILLATION (HCC): ICD-10-CM

## 2021-09-02 DIAGNOSIS — I49.5 SICK SINUS SYNDROME (HCC): ICD-10-CM

## 2021-09-02 PROCEDURE — 99215 OFFICE O/P EST HI 40 MIN: CPT | Performed by: INTERNAL MEDICINE

## 2021-09-02 PROCEDURE — 3017F COLORECTAL CA SCREEN DOC REV: CPT | Performed by: INTERNAL MEDICINE

## 2021-09-02 PROCEDURE — G8427 DOCREV CUR MEDS BY ELIG CLIN: HCPCS | Performed by: INTERNAL MEDICINE

## 2021-09-02 PROCEDURE — 93000 ELECTROCARDIOGRAM COMPLETE: CPT | Performed by: INTERNAL MEDICINE

## 2021-09-02 PROCEDURE — G8754 DIAS BP LESS 90: HCPCS | Performed by: INTERNAL MEDICINE

## 2021-09-02 PROCEDURE — G8417 CALC BMI ABV UP PARAM F/U: HCPCS | Performed by: INTERNAL MEDICINE

## 2021-09-02 PROCEDURE — G8536 NO DOC ELDER MAL SCRN: HCPCS | Performed by: INTERNAL MEDICINE

## 2021-09-02 PROCEDURE — 1101F PT FALLS ASSESS-DOCD LE1/YR: CPT | Performed by: INTERNAL MEDICINE

## 2021-09-02 PROCEDURE — G8432 DEP SCR NOT DOC, RNG: HCPCS | Performed by: INTERNAL MEDICINE

## 2021-09-02 PROCEDURE — G8752 SYS BP LESS 140: HCPCS | Performed by: INTERNAL MEDICINE

## 2021-09-02 PROCEDURE — 93291 INTERROG DEV EVAL SCRMS IP: CPT | Performed by: INTERNAL MEDICINE

## 2021-09-02 NOTE — PROGRESS NOTES
Bronson Burkitt presents today for   Chief Complaint   Patient presents with    Follow-up     6-8 week post op- no complaints        Bronson Burkitt preferred language for health care discussion is english/other. Is someone accompanying this pt? Yes, wife no    Is the patient using any DME equipment during OV? no    Depression Screening:  3 most recent PHQ Screens 5/19/2021   Little interest or pleasure in doing things Not at all   Feeling down, depressed, irritable, or hopeless Not at all   Total Score PHQ 2 0       Learning Assessment:  Learning Assessment 5/19/2021   PRIMARY LEARNER Patient   HIGHEST LEVEL OF EDUCATION - PRIMARY LEARNER  -   BARRIERS PRIMARY LEARNER -   [facility] NAME -   PRIMARY LANGUAGE ENGLISH   LEARNER PREFERENCE PRIMARY DEMONSTRATION   ANSWERED BY patient   RELATIONSHIP SELF       Abuse Screening:  Abuse Screening Questionnaire 5/19/2021   Do you ever feel afraid of your partner? N   Are you in a relationship with someone who physically or mentally threatens you? N   Is it safe for you to go home? Y       Fall Risk  Fall Risk Assessment, last 12 mths 5/19/2021   Able to walk? Yes   Fall in past 12 months? 0   Do you feel unsteady? 0   Are you worried about falling 0       Pt currently taking Anticoagulant therapy? Xarelto 20mg     Coordination of Care:  1. Have you been to the ER, urgent care clinic since your last visit? Hospitalized since your last visit? 7/13/21     2. Have you seen or consulted any other health care providers outside of the [address] [place] [address] since your last visit? Include any pap smears or colon screening.  no

## 2021-09-02 NOTE — PROGRESS NOTES
History of Present Illness:  79year old male here for follow up. He was referred back to me by Dr. Macey Dsouza for evaluation of atrial fibrillation. I performed a cardioversion at the end of 2019. He had maintained sinus rhythm. Over the past year prior he had noticed a decreased energy and he was actually admitted to the hospital in April with bradycardia and his AV blocking agents were decreased. He was placed back on a lower dose. We talked about atrial fibrillation ablation. He underwent pulmonary vein isolation with cryoballoon July 13th. I also placed a subcutaneous loop at that point to monitor for bradycardia and recurrent atrial fibrillation. He actually did quite well until this last Sunday, when he was out in the yard and perhaps overdid it a bit. He went into atrial fibrillation that night. He is here to discuss options. When he is in atrial fibrillation he gets more fatigued and his wife can verify this. He also notes that when he bends over and gets up a little fast he will get dizzy. Impression:  1. Recurrent symptomatic a-fib with reasonable rates. 2. Hx of trial of cardioversion 2019. 3. PVI with cryoballoon July 13, 2021.  4. Hx of sick sinus syndrome limiting AV blocking agents with subcutaneous loop recorder placed at the time of his ablation July 13, 2021. No significant episodes of bradycardia or pauses. 5. Chronic Xarelto use without bleeding issues. 6. Echo April, 2021 with normal function, mild left atrial enlargement. Plan:  He is in atrial fibrillation today, but rates are controlled on lower dose Cardizem and Metoprolol than what he was on earlier this year. I had extensive discussion once again about options. He has had now PVI and rate control, but still continues to have significant symptoms.   I would recommend we consider epicardial ablation with loading of Tikosyn while he was in the hospital.  I have asked that he follow up and refer to Dr. Gilles Kamara for consideration. He has never had pericarditis that he knows of. I am also going to double-check that he has been evaluated for sleep apnea. Another option would be just to consider another cardioversion or Tikosyn, however given his recurrence, the overall chance of success long term maintaining sinus rhythm is quite low. Past Medical History:   Diagnosis Date    Cancer [facility]     basal cell    Cholestanol storage disease     GERD (gastroesophageal reflux disease)     well controlled with meds    Hypercholesteremia     Hypertension 20007    5yrs    Kidney stones     Nausea & vomiting     Pneumonia        Current Outpatient Medications   Medication Sig Dispense Refill    cholecalciferol, vitamin D3, (Vitamin D3) 50 mcg (2,000 unit) tab Take  by mouth.  aspirin delayed-release 81 mg tablet Take  by mouth daily.  irbesartan (AVAPRO) 150 mg tablet       spironolactone (ALDACTONE) 25 mg tablet Take  by mouth daily.  dilTIAZem ER (CARDIZEM CD) 180 mg capsule TAKE 1 CAPSULE BY ORAL ROUTE EVERY DAY      Alphagan P 0.1 % ophthalmic solution       metoprolol tartrate (LOPRESSOR) 25 mg tablet Take 0.5 Tabs by mouth every twelve (12) hours. 60 Tab 1    rivaroxaban (XARELTO) 20 mg tab tablet Take 1 Tab by mouth daily (with dinner). 30 Tab 0    omeprazole (PRILOSEC) 40 mg capsule Take 40 mg by mouth daily.  Cetirizine (ZYRTEC) 10 mg cap Take 10 mg by mouth.  rosuvastatin (CRESTOR) 20 mg tablet Take 20 mg by mouth nightly. Social History   reports that he has never smoked. He has never used smokeless tobacco.   reports no history of alcohol use. Family History  family history is not on file. Review of Systems  Except as stated above include:  Constitutional: Negative for fever, chills and malaise/fatigue. HEENT: No congestion or recent URI. Gastrointestinal: No nausea, vomiting, abdominal pain, bloody stools. Pulmonary:  Negative except as stated above.   Cardiac: Negative except as stated above. Musculoskeletal: Negative except as stated above. Neurological:  No localized symptoms. Skin:  Negative except as stated above. Psych:  Negative except as stated above. Endocrine:  Negative except as stated above. PHYSICAL EXAM  BP Readings from Last 3 Encounters:   09/02/21 110/70   08/18/21 (!) 132/57   07/20/21 104/64     Pulse Readings from Last 3 Encounters:   09/02/21 77   08/18/21 60   07/20/21 66     Wt Readings from Last 3 Encounters:   09/02/21 103.9 kg (229 lb)   07/14/21 101.8 kg (224 lb 8 oz)   07/13/21 99.8 kg (220 lb)     General:   Well developed, well groomed. Head/Neck:   No obvious jugular venous distention     No obvious carotid pulsations. No evidence of xanthelasma. Lungs:   No respiratory distress. Clear bilaterally. Heart:  Irreg irreg,  Normal S1/S2. Palpation grossly normal.    No significant murmurs, rubs or gallops. Abdomen:   Non-acute abdomen. No obvious pulsations. Extremities:   Intact peripheral pulses. No significant edema. Neurological:   Alert and oriented to person, place, time. No focal neurological deficit visually. Skin:   No obvious rash    Blood Pressure Metric:  Monitor recommended and adjustments stated if needed.

## 2021-09-17 ENCOUNTER — OFFICE VISIT (OUTPATIENT)
Dept: CARDIOTHORACIC SURGERY | Age: 71
End: 2021-09-17
Payer: MEDICARE

## 2021-09-17 VITALS
BODY MASS INDEX: 31.02 KG/M2 | HEART RATE: 53 BPM | WEIGHT: 229 LBS | HEIGHT: 72 IN | TEMPERATURE: 98 F | RESPIRATION RATE: 18 BRPM | OXYGEN SATURATION: 97 % | SYSTOLIC BLOOD PRESSURE: 123 MMHG | DIASTOLIC BLOOD PRESSURE: 65 MMHG

## 2021-09-17 DIAGNOSIS — I48.91 ATRIAL FIBRILLATION, UNSPECIFIED TYPE (HCC): Primary | ICD-10-CM

## 2021-09-17 DIAGNOSIS — Z01.818 PREOP TESTING: ICD-10-CM

## 2021-09-17 PROCEDURE — G8752 SYS BP LESS 140: HCPCS | Performed by: SPECIALIST

## 2021-09-17 PROCEDURE — 3017F COLORECTAL CA SCREEN DOC REV: CPT | Performed by: SPECIALIST

## 2021-09-17 PROCEDURE — G8417 CALC BMI ABV UP PARAM F/U: HCPCS | Performed by: SPECIALIST

## 2021-09-17 PROCEDURE — G8754 DIAS BP LESS 90: HCPCS | Performed by: SPECIALIST

## 2021-09-17 PROCEDURE — G8432 DEP SCR NOT DOC, RNG: HCPCS | Performed by: SPECIALIST

## 2021-09-17 PROCEDURE — 1101F PT FALLS ASSESS-DOCD LE1/YR: CPT | Performed by: SPECIALIST

## 2021-09-17 PROCEDURE — 99205 OFFICE O/P NEW HI 60 MIN: CPT | Performed by: SPECIALIST

## 2021-09-17 PROCEDURE — G8536 NO DOC ELDER MAL SCRN: HCPCS | Performed by: SPECIALIST

## 2021-09-17 PROCEDURE — G8427 DOCREV CUR MEDS BY ELIG CLIN: HCPCS | Performed by: SPECIALIST

## 2021-09-17 NOTE — LETTER
9/17/2021    Patient: Enoch Jenkins   YOB: 1950   Date of Visit: 9/17/2021     Davie Aase, [address] [facility] [address]  Via Fax: 0472 [address]  [address] [facility]  Via In [facility] [address]    Dear Davie Aase, MD Lendell Mano, MD,      Thank you for referring Mr. Stevo Mathew to [facility] for evaluation. My notes for this consultation are attached. If you have questions, please do not hesitate to call me. I look forward to following your patient along with you.       Sincerely,    Yun Boucher MD
Yes

## 2021-09-17 NOTE — PROGRESS NOTES
[facility]  -  Consult      9/17/2021        Neela Gage is a 70 y.o. male who is being seen on consult for atrial fibrillation, at  Dr. Heidi Ferris request.      Subjective:     Chief Complaint   Patient presents with    Referral / Consult     A-fib    Irregular Heart Beat       History of Present Illness: The patient is a 29-year-old male who has had atrial fibrillation for the past 2 to 3 years. He is very symptomatic with respect to being in atrial fibrillation. His primary symptom is fatigue and decreased exercise tolerance. He had been cardioverted in 2019 and this worked for quite some time and he felt much better. The atrial fibrillation recurred however. He has had previous ablation in July of this year. This was initially successful and the patient felt great but just recently converted back into atrial fibrillation and is again quite symptomatic. The patient is referred for consideration of epicardial ablation.     Past Medical History:     Past Medical History:   Diagnosis Date    Cancer ([facility]     basal cell    Cholestanol storage disease     GERD (gastroesophageal reflux disease)     well controlled with meds    Hypercholesteremia     Hypertension 35012    5yrs    Kidney stones     Nausea & vomiting     Pneumonia        Past Surgical History:     Past Surgical History:   Procedure Laterality Date    CORONARY STENT EA ADDL VESSEL  12/4/2015    CORONARY STENT SINGLE W/PTCA  12/4/2015    HX ADENOIDECTOMY      HX APPENDECTOMY      HX CERVICAL FUSION  05/02/2012    HX HEENT  2013    sinus surgery    HX OTHER SURGICAL      basal cell removal from forehead    HX OTHER SURGICAL  2012    varicose vein surgery    HX TONSILLECTOMY      HX UROLOGICAL  4-13    lithotripsy    LEFT HEART PERCUTANEOUS  12/4/2015    SD CARDIAC SURG PROCEDURE UNLIST      SD CARDIOVERSION ELECTIVE ARRHYTHMIA EXTERNAL N/A 9/23/2019    EP CARDIOVERSION/KEEGAN prior performed by [facility]ton [facility] at [facility] CATH LAB    RT & LT HEART WITH C.O.  12/3/2017    BRYAN CORONARY ARTERIOGRAPH  12/4/2015    BRYAN CORONARY ARTERIOGRAPH  12/3/2017     Patient Active Problem List    Diagnosis Date Noted    A-fib [facility]) 07/13/2021    [facility] ([facility] 04/08/2021    Bradycardia 04/07/2021    Hypotension 04/07/2021    Acute renal failure superimposed on stage 3 chronic kidney disease ([facility] 04/07/2021    Dyspnea 83/00/6186    Diastolic CHF, acute on chronic (HCC) 11/24/2017    Elevated troponin 11/24/2017    CAD (coronary artery disease) 22/72/0698    Diastolic CHF, chronic ([facility]) 12/03/2015    NSTEMI (non-ST elevated myocardial infarction) ([facility] 12/02/2015    Cardiomyopathy ([facility] 01/27/2015    GERD (gastroesophageal reflux disease) 01/27/2015    CHF exacerbation ([facility] 01/07/2015    Calculus of kidney 05/07/2013    Near syncope 05/06/2013    UTI (urinary tract infection) 05/06/2013    Essential hypertension, benign 05/06/2013     Social History:   He  reports that he has never smoked. He has never used smokeless tobacco.  He  reports no history of alcohol use. Family History:     Family History   Problem Relation Age of Onset    Malignant Hyperthermia Neg Hx     Pseudocholinesterase Deficiency Neg Hx     Delayed Awakening Neg Hx     Post-op Nausea/Vomiting Neg Hx     Emergence Delirium Neg Hx        Allergies and Intolerances: Allergies   Allergen Reactions    Penicillins Rash and Itching       Home Medications:     Cannot display prior to admission medications because the patient has not been admitted in this contact. Current Outpatient Medications   Medication Sig Dispense Refill    cholecalciferol, vitamin D3, (Vitamin D3) 50 mcg (2,000 unit) tab Take  by mouth.  aspirin delayed-release 81 mg tablet Take  by mouth daily.  irbesartan (AVAPRO) 150 mg tablet       spironolactone (ALDACTONE) 25 mg tablet Take  by mouth daily.       dilTIAZem ER (CARDIZEM CD) 180 mg capsule TAKE 1 CAPSULE BY ORAL ROUTE EVERY DAY      Alphagan P 0.1 % ophthalmic solution       metoprolol tartrate (LOPRESSOR) 25 mg tablet Take 0.5 Tabs by mouth every twelve (12) hours. 60 Tab 1    rivaroxaban (XARELTO) 20 mg tab tablet Take 1 Tab by mouth daily (with dinner). 30 Tab 0    omeprazole (PRILOSEC) 40 mg capsule Take 40 mg by mouth daily.  Cetirizine (ZYRTEC) 10 mg cap Take 10 mg by mouth.  rosuvastatin (CRESTOR) 20 mg tablet Take 20 mg by mouth nightly. .     Review of Systems:     Except as stated above include:  Constitutional: Negative for fever, chills and malaise/fatigue. HEENT: No congestion or recent URI. Gastrointestinal: No nausea, vomiting, abdominal pain, bloody stools. Pulmonary:  Negative except as stated above. Cardiac:  Negative except as stated above. Musculoskeletal: Negative except as stated above. Neurological:  No localized symptoms. Skin:  Negative except as stated above. Psych:  Negative except as stated above. Endocrine:  Negative except as stated above. Physical Examination:     Visit Vitals  /65 (BP 1 Location: Right upper arm, BP Patient Position: Sitting, BP Cuff Size: Adult)   Pulse (!) 53   Temp 98 °F (36.7 °C) (Temporal)   Resp 18   Ht 6' (1.829 m)   Wt 103.9 kg (229 lb)   SpO2 97%   BMI 31.06 kg/m²       General:          Well developed, well groomed. Head/Neck:     No obvious jugular venous distention                           No obvious carotid pulsations. No evidence of xanthelasma. No carotid bruits. Lungs:             No respiratory distress. Clear bilaterally. Heart:              Irreg irreg,  Normal S1/S2. Palpation grossly normal.                          No significant murmurs, rubs or gallops. Abdomen:        Non-acute abdomen. No obvious pulsations.   Extremities:     Intact peripheral pulses. No significant edema. Neurological:   Alert and oriented to person, place, time. No focal neurological deficit visually. Skin:                No obvious rash  Laboratory Data:     Lab Results   Component Value Date/Time    WBC 6.1 08/18/2021 10:15 AM    HGB 14.5 08/18/2021 10:15 AM    HCT 41.7 08/18/2021 10:15 AM    PLATELET 343 13/53/3452 10:15 AM     Lab Results   Component Value Date/Time    Sodium 141 07/06/2021 09:49 AM    Potassium 4.4 07/06/2021 09:49 AM    Chloride 109 07/06/2021 09:49 AM    CO2 29 07/06/2021 09:49 AM    Glucose 107 (H) 07/06/2021 09:49 AM    BUN 19 (H) 07/06/2021 09:49 AM    Creatinine 1.34 (H) 07/06/2021 09:49 AM     Lab Results   Component Value Date/Time    Cholesterol, total 159 11/25/2017 01:25 AM    HDL Cholesterol 40 11/25/2017 01:25 AM    LDL, calculated 103.6 (H) 11/25/2017 01:25 AM    Triglyceride 77 11/25/2017 01:25 AM     Lab Results   Component Value Date/Time    Hemoglobin A1c 5.0 11/24/2017 08:50 AM     No results for input(s): CA, PHOS, ALB, TP, ALKP, TBILI in the last 72 hours. No lab exists for component: SGOT, SGPT, CBILI  ABG:  No results found for: PH, PHI, PCO2, PCO2I, PO2, PO2I, HCO3, HCO3I, FIO2, FIO2I  No results for input(s): TROIQ, CPK, CKMB in the last 72 hours. EKG: (9/2/2021)  Atrial fibrillation with controlled ventricular rate. KEEGAN ECHO: (7/13/2021)  Left Ventricle Normal cavity size and systolic function (ejection fraction normal). Wall motion: normal. The estimated EF is 55 - 60%. Visually measured ejection fraction. Left Atrium Normal cavity size. Right Ventricle Normal cavity size and global systolic function. Right Atrium Normal cavity size. Interatrial Septum Agitated saline contrast study was performed. There was no shunting at baseline or with Valsalva. Aortic Valve Normal valve structure, trileaflet valve structure and no stenosis. Trace aortic valve regurgitation. Mitral Valve Normal valve structure and no stenosis. Mild regurgitation. Tricuspid Valve Normal valve structure and no stenosis. Trace regurgitation. Pulmonic Valve Normal valve structure, no stenosis and no regurgitation. Aorta Normal aortic root, ascending aortic, and aortic arch. Normal aortic root. Pulmonary Artery Pulmonary hypertension not suggested by Doppler findings. IVC/Hepatic Veins Normal structure. Pericardium No evidence of pericardial effusion. CATHETERIZATION: (12/3/2017)    1. Patent large coronary arteries. 2. Patent normal left main artery, patent normal left anterior  descending. 3. Patent normal left circumflex artery. 4. Patent normal stent in the large left circumflex mid artery. 5. Single-vessel, nondominant right coronary artery moderate disease  that will be treated medically. No indication to fix any blockage on the  small nondominant right coronary artery at this point. 6. Left ventricular end-diastolic pressure is 15 mmHg. 7. Mildly elevated right-sided pressure. 8. There is no evidence of any significant pulmonary hypertension. ASSESSMENT AND PLAN:    I believe that this patient is a good candidate for the convergent procedure. His mitral regurgitation is mild on a recent KEEGAN and his appendage had no evidence of clot. (Severe mitral regurgitation and/or appendage clot are contraindications to the procedure.)  He is also interested in undergoing clip ligation of the left atrial appendage via left VATS after the epicardial ablation portion of the procedure. I described the procedure in detail including the risks and the expected hospital course. All questions were answered. I discussed with Dr. Karen Juárez the need for a more recent cardiac catheterization to rule out obstructive coronary disease. Patient would like to have a \"cath admit\" and as soon as the catheterization is scheduled, we will set the surgery up for the following day.     Thank you for allowing me to participate in the care of this patient. I spent approximately 90 minutes reviewing this patient's history and studies, meeting with the patient and his wife, discussing with Dr. Colin Villela, and developing a plan.     Pema Crabtree MD [facility]  Chief, Cardiothoracic Surgery   [facility]  [phone]

## 2021-09-17 NOTE — PROGRESS NOTES
Chief Complaint   Patient presents with    Referral / Consult     A-fib    Irregular Heart Beat     1. Have you been to the ER, urgent care clinic since your last visit? Hospitalized since your last visit? No    2. Have you seen or consulted any other health care providers outside of the [address] [place] [address] since your last visit? Include any pap smears or colon screening.  No

## 2021-09-22 ENCOUNTER — TELEPHONE (OUTPATIENT)
Dept: CARDIOLOGY CLINIC | Age: 71
End: 2021-09-22

## 2021-09-22 DIAGNOSIS — I42.9 CARDIOMYOPATHY, UNSPECIFIED TYPE (HCC): Primary | ICD-10-CM

## 2021-09-22 NOTE — TELEPHONE ENCOUNTER
Instructions    Patients Name:  Alysa Wilson    1. You are scheduled to have a left Heart cathetherization on October 7, 2021  at 0800 am Please check in at 0700 am  .     2. Please go to [facility] and park in the outpatient parking lot that is located around to the back of the hospital and enter through the [facility]. Once you enter through the [facility] check in with the  there. The  will either give you directions or assist you in getting to the cath holding area. 3. You are not to eat or drink anything after midnight the night before your procedure. Small sips of water to take your medications is ok. 4. If you are diabetic, do not take your insulin/sugar pill the morning of the procedure. 5. MEDICATION INSTRUCTIONS:   Please take your morning medications with the following special instructions: HOLD your Xarelto 48 hours before procedure, HOLD spironolactone (aldactone) 25 mg the morning of the procedure. [x]          Please make sure to HOLD your Blood pressure medication : irbesartan (AVAPRO) 150 mg, metoprolol (lopressor) 25 mg. [x]          Take your Aspirin and/or Plavix. 6. We encourage families to wait in the waiting room on the first floor while the procedure is being done. The Doctor will come out and talk with you as soon as the procedure is over. 7. There is the possibility that you may spend the night in the hospital, depending on the results of the procedure. This will be determined after the procedure is done. If angioplasty or stent is planned, you will stay at least one day. 8. If you or your family have any questions, please call our office Monday -Friday, 9:00 a.m.-4:30 p.m.,  At [phone], and ask to speak to one of the nurses. 9. Please have lab work and chest x-ray done on September 30, 2021.

## 2021-09-24 RX ORDER — DEXTROSE MONOHYDRATE AND SODIUM CHLORIDE 5; .45 G/100ML; G/100ML
150 INJECTION, SOLUTION INTRAVENOUS CONTINUOUS
Status: CANCELLED | OUTPATIENT
Start: 2021-09-24 | End: 2021-09-24

## 2021-09-24 RX ORDER — SODIUM CHLORIDE 0.9 % (FLUSH) 0.9 %
5-40 SYRINGE (ML) INJECTION EVERY 8 HOURS
Status: CANCELLED | OUTPATIENT
Start: 2021-09-24

## 2021-09-24 RX ORDER — SODIUM CHLORIDE 0.9 % (FLUSH) 0.9 %
5-40 SYRINGE (ML) INJECTION AS NEEDED
Status: CANCELLED | OUTPATIENT
Start: 2021-09-24

## 2021-09-24 RX ORDER — GUAIFENESIN 100 MG/5ML
162 LIQUID (ML) ORAL
Status: CANCELLED | OUTPATIENT
Start: 2021-09-24 | End: 2021-09-24

## 2021-09-24 RX ORDER — LORAZEPAM 2 MG/ML
1 INJECTION INTRAMUSCULAR ONCE
Status: CANCELLED | OUTPATIENT
Start: 2021-09-24 | End: 2021-09-24

## 2021-09-30 DIAGNOSIS — I25.10 CORONARY ARTERY DISEASE INVOLVING NATIVE CORONARY ARTERY OF NATIVE HEART WITHOUT ANGINA PECTORIS: ICD-10-CM

## 2021-09-30 DIAGNOSIS — I42.9 CARDIOMYOPATHY, UNSPECIFIED TYPE (HCC): Primary | ICD-10-CM

## 2021-09-30 NOTE — PROGRESS NOTES
Verbal order and read back per Sharon Knight MD  Insurance denied the left and right heart cath before the ablation with Dr Reyna Briggs.     Order pharm nuc asap     Order in chart

## 2021-10-01 ENCOUNTER — HOSPITAL ENCOUNTER (OUTPATIENT)
Dept: PREADMISSION TESTING | Age: 71
Discharge: HOME | End: 2021-10-01
Payer: MEDICARE

## 2021-10-01 ENCOUNTER — HOSPITAL ENCOUNTER (OUTPATIENT)
Dept: GENERAL RADIOLOGY | Age: 71
Discharge: HOME | End: 2021-10-01
Payer: MEDICARE

## 2021-10-01 ENCOUNTER — TRANSCRIBE ORDER (OUTPATIENT)
Dept: REGISTRATION | Age: 71
End: 2021-10-01

## 2021-10-01 DIAGNOSIS — I42.9 CARDIOMYOPATHY, UNSPECIFIED TYPE (HCC): ICD-10-CM

## 2021-10-01 DIAGNOSIS — I42.9 CARDIOMYOPATHY OF UNDETERMINED TYPE (HCC): ICD-10-CM

## 2021-10-01 DIAGNOSIS — I42.9 CARDIOMYOPATHY OF UNDETERMINED TYPE (HCC): Primary | ICD-10-CM

## 2021-10-01 LAB
ALBUMIN SERPL-MCNC: 3.6 G/DL (ref 3.4–5)
ALBUMIN/GLOB SERPL: 1.2 {RATIO} (ref 0.8–1.7)
ALP SERPL-CCNC: 77 U/L (ref 45–117)
ALT SERPL-CCNC: 24 U/L (ref 16–61)
ANION GAP SERPL CALC-SCNC: 6 MMOL/L (ref 3–18)
AST SERPL-CCNC: 21 U/L (ref 10–38)
BASOPHILS # BLD: 0.1 K/UL (ref 0–0.1)
BASOPHILS NFR BLD: 1 % (ref 0–2)
BILIRUB SERPL-MCNC: 0.5 MG/DL (ref 0.2–1)
BUN SERPL-MCNC: 22 MG/DL (ref 7–18)
BUN/CREAT SERPL: 11 (ref 12–20)
CALCIUM SERPL-MCNC: 8.7 MG/DL (ref 8.5–10.1)
CHLORIDE SERPL-SCNC: 111 MMOL/L (ref 100–111)
CO2 SERPL-SCNC: 25 MMOL/L (ref 21–32)
CREAT SERPL-MCNC: 1.94 MG/DL (ref 0.6–1.3)
DIFFERENTIAL METHOD BLD: ABNORMAL
EOSINOPHIL # BLD: 0.4 K/UL (ref 0–0.4)
EOSINOPHIL NFR BLD: 7 % (ref 0–5)
ERYTHROCYTE [DISTWIDTH] IN BLOOD BY AUTOMATED COUNT: 12.2 % (ref 11.6–14.5)
GLOBULIN SER CALC-MCNC: 2.9 G/DL (ref 2–4)
GLUCOSE SERPL-MCNC: 76 MG/DL (ref 74–99)
HCT VFR BLD AUTO: 44.9 % (ref 36–48)
HGB BLD-MCNC: 14.8 G/DL (ref 13–16)
INR PPP: 1.6 (ref 0.8–1.2)
LYMPHOCYTES # BLD: 1.3 K/UL (ref 0.9–3.6)
LYMPHOCYTES NFR BLD: 22 % (ref 21–52)
MCH RBC QN AUTO: 29.7 PG (ref 24–34)
MCHC RBC AUTO-ENTMCNC: 33 G/DL (ref 31–37)
MCV RBC AUTO: 90 FL (ref 78–100)
MONOCYTES # BLD: 0.7 K/UL (ref 0.05–1.2)
MONOCYTES NFR BLD: 13 % (ref 3–10)
NEUTS SEG # BLD: 3.2 K/UL (ref 1.8–8)
NEUTS SEG NFR BLD: 57 % (ref 40–73)
PLATELET # BLD AUTO: 177 K/UL (ref 135–420)
PMV BLD AUTO: 11.9 FL (ref 9.2–11.8)
POTASSIUM SERPL-SCNC: 4.7 MMOL/L (ref 3.5–5.5)
PROT SERPL-MCNC: 6.5 G/DL (ref 6.4–8.2)
PROTHROMBIN TIME: 18.4 SEC (ref 11.5–15.2)
RBC # BLD AUTO: 4.99 M/UL (ref 4.35–5.65)
SODIUM SERPL-SCNC: 142 MMOL/L (ref 136–145)
WBC # BLD AUTO: 5.7 K/UL (ref 4.6–13.2)

## 2021-10-01 PROCEDURE — 85610 PROTHROMBIN TIME: CPT

## 2021-10-01 PROCEDURE — 71046 X-RAY EXAM CHEST 2 VIEWS: CPT

## 2021-10-01 PROCEDURE — 85025 COMPLETE CBC W/AUTO DIFF WBC: CPT

## 2021-10-01 PROCEDURE — 36415 COLL VENOUS BLD VENIPUNCTURE: CPT

## 2021-10-01 PROCEDURE — 80053 COMPREHEN METABOLIC PANEL: CPT

## 2021-10-04 ENCOUNTER — TELEPHONE (OUTPATIENT)
Dept: CARDIOTHORACIC SURGERY | Age: 71
End: 2021-10-04

## 2021-10-04 ENCOUNTER — HOSPITAL ENCOUNTER (OUTPATIENT)
Dept: PREADMISSION TESTING | Age: 71
Discharge: HOME | End: 2021-10-04
Payer: MEDICARE

## 2021-10-04 LAB — SARS-COV-2, NAA: NOT DETECTED

## 2021-10-04 PROCEDURE — U0003 INFECTIOUS AGENT DETECTION BY NUCLEIC ACID (DNA OR RNA); SEVERE ACUTE RESPIRATORY SYNDROME CORONAVIRUS 2 (SARS-COV-2) (CORONAVIRUS DISEASE [COVID-19]), AMPLIFIED PROBE TECHNIQUE, MAKING USE OF HIGH THROUGHPUT TECHNOLOGIES AS DESCRIBED BY CMS-2020-01-R: HCPCS

## 2021-10-04 NOTE — TELEPHONE ENCOUNTER
Called patient [employer] [facility] and spoke to Mikey Rust for prior Auth for Epicardial ablation (Convergent) Left atrial appendage clip ligation via left VATS was approved for 10/8/21, reference number [id number]. Fax number is [phone].

## 2021-10-05 ENCOUNTER — HOSPITAL ENCOUNTER (OUTPATIENT)
Dept: PREADMISSION TESTING | Age: 71
Discharge: HOME | DRG: 229 | End: 2021-10-05
Payer: MEDICARE

## 2021-10-05 ENCOUNTER — TELEPHONE (OUTPATIENT)
Dept: CARDIOTHORACIC SURGERY | Age: 71
End: 2021-10-05

## 2021-10-05 DIAGNOSIS — I48.91 ATRIAL FIBRILLATION, UNSPECIFIED TYPE (HCC): ICD-10-CM

## 2021-10-05 DIAGNOSIS — Z01.818 PREOP TESTING: ICD-10-CM

## 2021-10-05 LAB
APPEARANCE UR: CLEAR
BILIRUB UR QL: NEGATIVE
COLOR UR: YELLOW
GLUCOSE UR STRIP.AUTO-MCNC: NEGATIVE MG/DL
HGB UR QL STRIP: NEGATIVE
HISTORY CHECKED?,CKHIST: NORMAL
KETONES UR QL STRIP.AUTO: NEGATIVE MG/DL
LEUKOCYTE ESTERASE UR QL STRIP.AUTO: NEGATIVE
NITRITE UR QL STRIP.AUTO: NEGATIVE
PH UR STRIP: 5.5 [PH] (ref 5–8)
PROT UR STRIP-MCNC: NEGATIVE MG/DL
SP GR UR REFRACTOMETRY: 1.01 (ref 1–1.03)
UROBILINOGEN UR QL STRIP.AUTO: 0.2 EU/DL (ref 0.2–1)

## 2021-10-05 PROCEDURE — 36415 COLL VENOUS BLD VENIPUNCTURE: CPT

## 2021-10-05 PROCEDURE — 81003 URINALYSIS AUTO W/O SCOPE: CPT

## 2021-10-05 PROCEDURE — 86920 COMPATIBILITY TEST SPIN: CPT

## 2021-10-05 PROCEDURE — 86901 BLOOD TYPING SEROLOGIC RH(D): CPT

## 2021-10-05 RX ORDER — MUPIROCIN 20 MG/G
OINTMENT TOPICAL DAILY
Qty: 22 G | Refills: 0 | Status: SHIPPED | COMMUNITY
Start: 2021-10-05 | End: 2021-10-11

## 2021-10-05 RX ORDER — CHLORHEXIDINE GLUCONATE 4 G/100ML
15 SOLUTION TOPICAL DAILY
Qty: 3 PACKET | Refills: 0 | Status: SHIPPED | COMMUNITY
Start: 2021-10-05 | End: 2021-10-11

## 2021-10-05 NOTE — TELEPHONE ENCOUNTER
Verbal order received from Dr. Genet Crowley for patient to use Hibiclens for 3 days on sternal area and Bactroban ointment in both nostrils for three days starting on 10/08/2021. Met with patient in the registration area to give the following surgical instructions;     Surgical Instructions for Donette Jeans:     Stop Rivaroxaban (Xarelto) 20 mg last dose on 10/05/2021.  Complete your three showers using the Hibiclens surgical soap on Tuseday night, Wednesday night and Thursday night. Use soap on chest/ sternal area only. *DO NOT wash face or privates with the surgical soap. *   Use the Bactroban ointment to both nostrils as instructed after each shower.  Stop taking the Aspirin and Irbesartan (Avapro) medication after dose on Tuesday.  Nothing to eat or drink after midnight on Thursday night.  The morning of surgery please take Metoprolol tartrate (Lopressor) medication with a very small sip of water (ONLY).  Check in at 6:30 am Friday, 10/08/2021, at the lobby of the [address] [place] [address].  Please bring all medications to the hospital the morning of surgery.  Call [phone] if any questions or concerns. Dispensed Hibiclens and Bactroban ointment to the patient. Patient Verbalized understanding and did not have any questions.

## 2021-10-08 ENCOUNTER — APPOINTMENT (OUTPATIENT)
Dept: GENERAL RADIOLOGY | Age: 71
DRG: 229 | End: 2021-10-08
Attending: SPECIALIST
Payer: MEDICARE

## 2021-10-08 ENCOUNTER — APPOINTMENT (OUTPATIENT)
Dept: GENERAL RADIOLOGY | Age: 71
DRG: 229 | End: 2021-10-08
Attending: PHYSICIAN ASSISTANT
Payer: MEDICARE

## 2021-10-08 ENCOUNTER — ANESTHESIA (OUTPATIENT)
Dept: CARDIOTHORACIC SURGERY | Age: 71
DRG: 229 | End: 2021-10-08
Payer: MEDICARE

## 2021-10-08 ENCOUNTER — HOSPITAL ENCOUNTER (INPATIENT)
Age: 71
LOS: 3 days | Discharge: HOME HEALTH | DRG: 229 | End: 2021-10-11
Attending: SPECIALIST | Admitting: SPECIALIST
Payer: MEDICARE

## 2021-10-08 ENCOUNTER — ANESTHESIA EVENT (OUTPATIENT)
Dept: CARDIOTHORACIC SURGERY | Age: 71
DRG: 229 | End: 2021-10-08
Payer: MEDICARE

## 2021-10-08 DIAGNOSIS — I48.91 ATRIAL FIBRILLATION, UNSPECIFIED TYPE (HCC): ICD-10-CM

## 2021-10-08 DIAGNOSIS — Z98.890 S/P ABLATION OF ATRIAL FIBRILLATION: Primary | ICD-10-CM

## 2021-10-08 DIAGNOSIS — Z86.79 S/P ABLATION OF ATRIAL FIBRILLATION: Primary | ICD-10-CM

## 2021-10-08 PROCEDURE — 77030008771 HC TU NG SALEM SUMP -A: Performed by: ANESTHESIOLOGY

## 2021-10-08 PROCEDURE — 77030008500 HC TBNG CONN PRSS BD -A: Performed by: ANESTHESIOLOGY

## 2021-10-08 PROCEDURE — 74011250636 HC RX REV CODE- 250/636: Performed by: ANESTHESIOLOGY

## 2021-10-08 PROCEDURE — 77030018673: Performed by: SPECIALIST

## 2021-10-08 PROCEDURE — 74011250636 HC RX REV CODE- 250/636: Performed by: PHYSICIAN ASSISTANT

## 2021-10-08 PROCEDURE — 74011000258 HC RX REV CODE- 258: Performed by: ANESTHESIOLOGY

## 2021-10-08 PROCEDURE — 2709999900 HC NON-CHARGEABLE SUPPLY: Performed by: SPECIALIST

## 2021-10-08 PROCEDURE — 74011000258 HC RX REV CODE- 258: Performed by: SPECIALIST

## 2021-10-08 PROCEDURE — 76060000041 HC ANESTHESIA 5 TO 5.5 HR: Performed by: SPECIALIST

## 2021-10-08 PROCEDURE — 77030012770 HC TRCR OPT FX AMR -B: Performed by: SPECIALIST

## 2021-10-08 PROCEDURE — 77030038015 HC ATRICLIP PRO GILLINOV ATRC -I1: Performed by: SPECIALIST

## 2021-10-08 PROCEDURE — 99100 ANES PT EXTEME AGE<1 YR&>70: CPT | Performed by: ANESTHESIOLOGY

## 2021-10-08 PROCEDURE — 77030006984: Performed by: SPECIALIST

## 2021-10-08 PROCEDURE — 77030002933 HC SUT MCRYL J&J -A: Performed by: SPECIALIST

## 2021-10-08 PROCEDURE — 33340 PERQ CLSR TCAT L ATR APNDGE: CPT | Performed by: SPECIALIST

## 2021-10-08 PROCEDURE — 77030013797 HC KT TRNSDUC PRSSR EDWD -A: Performed by: ANESTHESIOLOGY

## 2021-10-08 PROCEDURE — 77030040830 HC CATH URETH FOL MDII -A: Performed by: SPECIALIST

## 2021-10-08 PROCEDURE — 77030038692 HC WND DEB SYS IRMX -B: Performed by: SPECIALIST

## 2021-10-08 PROCEDURE — 77030002996 HC SUT SLK J&J -A: Performed by: SPECIALIST

## 2021-10-08 PROCEDURE — 77030018719 HC DRSG PTCH ANTIMIC J&J -A: Performed by: ANESTHESIOLOGY

## 2021-10-08 PROCEDURE — 77030005401 HC CATH RAD ARRO -A: Performed by: ANESTHESIOLOGY

## 2021-10-08 PROCEDURE — 02584ZZ DESTRUCTION OF CONDUCTION MECHANISM, PERCUTANEOUS ENDOSCOPIC APPROACH: ICD-10-PCS | Performed by: SPECIALIST

## 2021-10-08 PROCEDURE — C9290 INJ, BUPIVACAINE LIPOSOME: HCPCS | Performed by: SPECIALIST

## 2021-10-08 PROCEDURE — 77030019908 HC STETH ESOPH SIMS -A: Performed by: ANESTHESIOLOGY

## 2021-10-08 PROCEDURE — 74011000250 HC RX REV CODE- 250: Performed by: ANESTHESIOLOGY

## 2021-10-08 PROCEDURE — 77030040922 HC BLNKT HYPOTHRM STRY -A: Performed by: SPECIALIST

## 2021-10-08 PROCEDURE — 77030031139 HC SUT VCRL2 J&J -A: Performed by: SPECIALIST

## 2021-10-08 PROCEDURE — 00560 ANES PX HEART W/O PUMP OXTJ: CPT | Performed by: ANESTHESIOLOGY

## 2021-10-08 PROCEDURE — 74011000250 HC RX REV CODE- 250: Performed by: PHYSICIAN ASSISTANT

## 2021-10-08 PROCEDURE — 77030041363: Performed by: SPECIALIST

## 2021-10-08 PROCEDURE — 77030008606 HC TRCR ENDOSC KII AMR -B: Performed by: SPECIALIST

## 2021-10-08 PROCEDURE — 65620000000 HC RM CCU GENERAL

## 2021-10-08 PROCEDURE — 71045 X-RAY EXAM CHEST 1 VIEW: CPT

## 2021-10-08 PROCEDURE — 77030018843 HC SOL IRR SOD CL 9% SLSH BAXT -B: Performed by: SPECIALIST

## 2021-10-08 PROCEDURE — 77030016154: Performed by: SPECIALIST

## 2021-10-08 PROCEDURE — 77030040361 HC SLV COMPR DVT MDII -B: Performed by: SPECIALIST

## 2021-10-08 PROCEDURE — 33254 ABLATE ATRIA LMTD: CPT | Performed by: SPECIALIST

## 2021-10-08 PROCEDURE — 77030008671 HC TU ENDO/BRNC CUF COVD -B: Performed by: ANESTHESIOLOGY

## 2021-10-08 PROCEDURE — 76010000198 HC CV SURG 5 TO 5.5 HR INTENSV-TIER 1: Performed by: SPECIALIST

## 2021-10-08 PROCEDURE — 77030018823 HC SLV COMPR VENO -B: Performed by: SPECIALIST

## 2021-10-08 PROCEDURE — 33254 ABLATE ATRIA LMTD: CPT | Performed by: PHYSICIAN ASSISTANT

## 2021-10-08 PROCEDURE — 0WJD4ZZ INSPECTION OF PERICARDIAL CAVITY, PERCUTANEOUS ENDOSCOPIC APPROACH: ICD-10-PCS | Performed by: SPECIALIST

## 2021-10-08 PROCEDURE — 74011250637 HC RX REV CODE- 250/637: Performed by: PHYSICIAN ASSISTANT

## 2021-10-08 PROCEDURE — 36620 INSERTION CATHETER ARTERY: CPT | Performed by: ANESTHESIOLOGY

## 2021-10-08 PROCEDURE — 77030012390 HC DRN CHST BTL GTNG -B: Performed by: SPECIALIST

## 2021-10-08 PROCEDURE — 77030011264 HC ELECTRD BLD EXT COVD -A: Performed by: SPECIALIST

## 2021-10-08 PROCEDURE — 02L74CK OCCLUSION OF LEFT ATRIAL APPENDAGE WITH EXTRALUMINAL DEVICE, PERCUTANEOUS ENDOSCOPIC APPROACH: ICD-10-PCS | Performed by: SPECIALIST

## 2021-10-08 PROCEDURE — P9045 ALBUMIN (HUMAN), 5%, 250 ML: HCPCS | Performed by: ANESTHESIOLOGY

## 2021-10-08 PROCEDURE — 74011250636 HC RX REV CODE- 250/636: Performed by: SPECIALIST

## 2021-10-08 PROCEDURE — 77030034628 HC LIGASURE LAP SEAL DIV MD COVD -F: Performed by: SPECIALIST

## 2021-10-08 PROCEDURE — 77030015766: Performed by: SPECIALIST

## 2021-10-08 PROCEDURE — 77030019605: Performed by: SPECIALIST

## 2021-10-08 DEVICE — DEVICE OCCL CLP L35MM PRELD FOR THORACOSCOPIC PROC GILLINOV: Type: IMPLANTABLE DEVICE | Site: HEART | Status: FUNCTIONAL

## 2021-10-08 RX ORDER — PROPOFOL 10 MG/ML
INJECTION, EMULSION INTRAVENOUS AS NEEDED
Status: DISCONTINUED | OUTPATIENT
Start: 2021-10-08 | End: 2021-10-08 | Stop reason: HOSPADM

## 2021-10-08 RX ORDER — HYDROMORPHONE HYDROCHLORIDE 2 MG/ML
INJECTION, SOLUTION INTRAMUSCULAR; INTRAVENOUS; SUBCUTANEOUS AS NEEDED
Status: DISCONTINUED | OUTPATIENT
Start: 2021-10-08 | End: 2021-10-08 | Stop reason: HOSPADM

## 2021-10-08 RX ORDER — SUCCINYLCHOLINE CHLORIDE 20 MG/ML
INJECTION INTRAMUSCULAR; INTRAVENOUS AS NEEDED
Status: DISCONTINUED | OUTPATIENT
Start: 2021-10-08 | End: 2021-10-08 | Stop reason: HOSPADM

## 2021-10-08 RX ORDER — SODIUM CHLORIDE 9 MG/ML
INJECTION INTRAMUSCULAR; INTRAVENOUS; SUBCUTANEOUS
Status: DISCONTINUED
Start: 2021-10-08 | End: 2021-10-08

## 2021-10-08 RX ORDER — SODIUM CHLORIDE 0.9 % (FLUSH) 0.9 %
5-40 SYRINGE (ML) INJECTION AS NEEDED
Status: DISCONTINUED | OUTPATIENT
Start: 2021-10-08 | End: 2021-10-08 | Stop reason: HOSPADM

## 2021-10-08 RX ORDER — SODIUM CHLORIDE 0.9 % (FLUSH) 0.9 %
5-40 SYRINGE (ML) INJECTION EVERY 8 HOURS
Status: DISCONTINUED | OUTPATIENT
Start: 2021-10-08 | End: 2021-10-08 | Stop reason: HOSPADM

## 2021-10-08 RX ORDER — ROCURONIUM BROMIDE 10 MG/ML
INJECTION, SOLUTION INTRAVENOUS AS NEEDED
Status: DISCONTINUED | OUTPATIENT
Start: 2021-10-08 | End: 2021-10-08 | Stop reason: HOSPADM

## 2021-10-08 RX ORDER — BISACODYL 5 MG
10 TABLET, DELAYED RELEASE (ENTERIC COATED) ORAL DAILY
Status: DISCONTINUED | OUTPATIENT
Start: 2021-10-09 | End: 2021-10-11 | Stop reason: HOSPADM

## 2021-10-08 RX ORDER — ONDANSETRON 2 MG/ML
4 INJECTION INTRAMUSCULAR; INTRAVENOUS
Status: DISCONTINUED | OUTPATIENT
Start: 2021-10-08 | End: 2021-10-09

## 2021-10-08 RX ORDER — DEXAMETHASONE SODIUM PHOSPHATE 4 MG/ML
INJECTION, SOLUTION INTRA-ARTICULAR; INTRALESIONAL; INTRAMUSCULAR; INTRAVENOUS; SOFT TISSUE AS NEEDED
Status: DISCONTINUED | OUTPATIENT
Start: 2021-10-08 | End: 2021-10-08 | Stop reason: HOSPADM

## 2021-10-08 RX ORDER — ALBUMIN HUMAN 50 G/1000ML
SOLUTION INTRAVENOUS
Status: COMPLETED
Start: 2021-10-08 | End: 2021-10-08

## 2021-10-08 RX ORDER — SODIUM CHLORIDE, SODIUM LACTATE, POTASSIUM CHLORIDE, CALCIUM CHLORIDE 600; 310; 30; 20 MG/100ML; MG/100ML; MG/100ML; MG/100ML
INJECTION, SOLUTION INTRAVENOUS
Status: DISCONTINUED | OUTPATIENT
Start: 2021-10-08 | End: 2021-10-08 | Stop reason: HOSPADM

## 2021-10-08 RX ORDER — BRIMONIDINE TARTRATE 2 MG/ML
1 SOLUTION/ DROPS OPHTHALMIC 2 TIMES DAILY
Status: DISCONTINUED | OUTPATIENT
Start: 2021-10-08 | End: 2021-10-11 | Stop reason: HOSPADM

## 2021-10-08 RX ORDER — LIDOCAINE HYDROCHLORIDE 20 MG/ML
INJECTION, SOLUTION EPIDURAL; INFILTRATION; INTRACAUDAL; PERINEURAL AS NEEDED
Status: DISCONTINUED | OUTPATIENT
Start: 2021-10-08 | End: 2021-10-08 | Stop reason: HOSPADM

## 2021-10-08 RX ORDER — SODIUM CHLORIDE 0.9 % (FLUSH) 0.9 %
5-40 SYRINGE (ML) INJECTION AS NEEDED
Status: DISCONTINUED | OUTPATIENT
Start: 2021-10-08 | End: 2021-10-09 | Stop reason: SDUPTHER

## 2021-10-08 RX ORDER — EPHEDRINE SULFATE/0.9% NACL/PF 50 MG/5 ML
SYRINGE (ML) INTRAVENOUS AS NEEDED
Status: DISCONTINUED | OUTPATIENT
Start: 2021-10-08 | End: 2021-10-08 | Stop reason: HOSPADM

## 2021-10-08 RX ORDER — SODIUM CHLORIDE 9 MG/ML
500 INJECTION, SOLUTION INTRAVENOUS CONTINUOUS
Status: DISCONTINUED | OUTPATIENT
Start: 2021-10-09 | End: 2021-10-08

## 2021-10-08 RX ORDER — GLYCOPYRROLATE 0.2 MG/ML
INJECTION INTRAMUSCULAR; INTRAVENOUS AS NEEDED
Status: DISCONTINUED | OUTPATIENT
Start: 2021-10-08 | End: 2021-10-08 | Stop reason: HOSPADM

## 2021-10-08 RX ORDER — OXYCODONE AND ACETAMINOPHEN 5; 325 MG/1; MG/1
1-2 TABLET ORAL
Status: DISCONTINUED | OUTPATIENT
Start: 2021-10-08 | End: 2021-10-11 | Stop reason: HOSPADM

## 2021-10-08 RX ORDER — ALBUMIN HUMAN 50 G/1000ML
SOLUTION INTRAVENOUS AS NEEDED
Status: DISCONTINUED | OUTPATIENT
Start: 2021-10-08 | End: 2021-10-08 | Stop reason: HOSPADM

## 2021-10-08 RX ORDER — NEOSTIGMINE METHYLSULFATE 1 MG/ML
INJECTION, SOLUTION INTRAVENOUS AS NEEDED
Status: DISCONTINUED | OUTPATIENT
Start: 2021-10-08 | End: 2021-10-08 | Stop reason: HOSPADM

## 2021-10-08 RX ORDER — MIDAZOLAM HYDROCHLORIDE 1 MG/ML
INJECTION, SOLUTION INTRAMUSCULAR; INTRAVENOUS AS NEEDED
Status: DISCONTINUED | OUTPATIENT
Start: 2021-10-08 | End: 2021-10-08 | Stop reason: HOSPADM

## 2021-10-08 RX ORDER — NALOXONE HYDROCHLORIDE 0.4 MG/ML
0.4 INJECTION, SOLUTION INTRAMUSCULAR; INTRAVENOUS; SUBCUTANEOUS AS NEEDED
Status: DISCONTINUED | OUTPATIENT
Start: 2021-10-08 | End: 2021-10-11 | Stop reason: HOSPADM

## 2021-10-08 RX ORDER — SODIUM CHLORIDE 9 MG/ML
75 INJECTION, SOLUTION INTRAVENOUS CONTINUOUS
Status: DISPENSED | OUTPATIENT
Start: 2021-10-08 | End: 2021-10-09

## 2021-10-08 RX ORDER — SODIUM CHLORIDE 0.9 % (FLUSH) 0.9 %
5-40 SYRINGE (ML) INJECTION AS NEEDED
Status: DISCONTINUED | OUTPATIENT
Start: 2021-10-08 | End: 2021-10-11 | Stop reason: HOSPADM

## 2021-10-08 RX ORDER — SODIUM CHLORIDE 9 MG/ML
500 INJECTION, SOLUTION INTRAVENOUS CONTINUOUS
Status: DISCONTINUED | OUTPATIENT
Start: 2021-10-08 | End: 2021-10-08

## 2021-10-08 RX ORDER — VECURONIUM BROMIDE FOR INJECTION 1 MG/ML
INJECTION, POWDER, LYOPHILIZED, FOR SOLUTION INTRAVENOUS AS NEEDED
Status: DISCONTINUED | OUTPATIENT
Start: 2021-10-08 | End: 2021-10-08 | Stop reason: HOSPADM

## 2021-10-08 RX ORDER — SODIUM CHLORIDE 0.9 % (FLUSH) 0.9 %
5-40 SYRINGE (ML) INJECTION EVERY 8 HOURS
Status: DISCONTINUED | OUTPATIENT
Start: 2021-10-08 | End: 2021-10-09 | Stop reason: SDUPTHER

## 2021-10-08 RX ORDER — LORAZEPAM 2 MG/ML
1 INJECTION INTRAMUSCULAR ONCE
Status: DISCONTINUED | OUTPATIENT
Start: 2021-10-08 | End: 2021-10-08

## 2021-10-08 RX ORDER — MORPHINE SULFATE 2 MG/ML
2 INJECTION, SOLUTION INTRAMUSCULAR; INTRAVENOUS
Status: DISCONTINUED | OUTPATIENT
Start: 2021-10-08 | End: 2021-10-11 | Stop reason: HOSPADM

## 2021-10-08 RX ORDER — GUAIFENESIN 100 MG/5ML
162 LIQUID (ML) ORAL
Status: DISCONTINUED | OUTPATIENT
Start: 2021-10-08 | End: 2021-10-08

## 2021-10-08 RX ORDER — HYDRALAZINE HYDROCHLORIDE 20 MG/ML
10-20 INJECTION INTRAMUSCULAR; INTRAVENOUS
Status: DISCONTINUED | OUTPATIENT
Start: 2021-10-08 | End: 2021-10-11

## 2021-10-08 RX ORDER — FENTANYL CITRATE 50 UG/ML
INJECTION, SOLUTION INTRAMUSCULAR; INTRAVENOUS AS NEEDED
Status: DISCONTINUED | OUTPATIENT
Start: 2021-10-08 | End: 2021-10-08 | Stop reason: HOSPADM

## 2021-10-08 RX ORDER — ASPIRIN 81 MG/1
81 TABLET ORAL DAILY
Status: DISCONTINUED | OUTPATIENT
Start: 2021-10-09 | End: 2021-10-11 | Stop reason: HOSPADM

## 2021-10-08 RX ORDER — METOPROLOL TARTRATE 25 MG/1
12.5 TABLET, FILM COATED ORAL EVERY 12 HOURS
Status: DISCONTINUED | OUTPATIENT
Start: 2021-10-08 | End: 2021-10-11 | Stop reason: HOSPADM

## 2021-10-08 RX ORDER — SODIUM CHLORIDE 0.9 % (FLUSH) 0.9 %
5-40 SYRINGE (ML) INJECTION EVERY 8 HOURS
Status: DISCONTINUED | OUTPATIENT
Start: 2021-10-08 | End: 2021-10-11 | Stop reason: HOSPADM

## 2021-10-08 RX ORDER — HEPARIN SODIUM 5000 [USP'U]/ML
5000 INJECTION, SOLUTION INTRAVENOUS; SUBCUTANEOUS EVERY 12 HOURS
Status: DISCONTINUED | OUTPATIENT
Start: 2021-10-08 | End: 2021-10-10

## 2021-10-08 RX ORDER — MUPIROCIN 20 MG/G
OINTMENT TOPICAL 2 TIMES DAILY
Status: DISPENSED | OUTPATIENT
Start: 2021-10-08 | End: 2021-10-10

## 2021-10-08 RX ORDER — DEXTROSE MONOHYDRATE AND SODIUM CHLORIDE 5; .45 G/100ML; G/100ML
150 INJECTION, SOLUTION INTRAVENOUS CONTINUOUS
Status: DISCONTINUED | OUTPATIENT
Start: 2021-10-08 | End: 2021-10-08

## 2021-10-08 RX ORDER — COLCHICINE 0.6 MG/1
0.6 CAPSULE ORAL 2 TIMES DAILY
Status: DISCONTINUED | OUTPATIENT
Start: 2021-10-08 | End: 2021-10-11 | Stop reason: HOSPADM

## 2021-10-08 RX ADMIN — LIDOCAINE HYDROCHLORIDE 50 MG: 20 INJECTION, SOLUTION EPIDURAL; INFILTRATION; INTRACAUDAL; PERINEURAL at 08:30

## 2021-10-08 RX ADMIN — VECURONIUM BROMIDE 4 MG: 1 INJECTION, POWDER, LYOPHILIZED, FOR SOLUTION INTRAVENOUS at 09:59

## 2021-10-08 RX ADMIN — Medication 5 MG: at 11:35

## 2021-10-08 RX ADMIN — HYDROMORPHONE HYDROCHLORIDE 0.5 MG: 2 INJECTION, SOLUTION INTRAMUSCULAR; INTRAVENOUS; SUBCUTANEOUS at 10:06

## 2021-10-08 RX ADMIN — GLYCOPYRROLATE 0.4 MG: 0.2 INJECTION, SOLUTION INTRAMUSCULAR; INTRAVENOUS at 13:14

## 2021-10-08 RX ADMIN — PHENYLEPHRINE HYDROCHLORIDE 5 MCG/MIN: 10 INJECTION INTRAVENOUS at 09:35

## 2021-10-08 RX ADMIN — VANCOMYCIN HYDROCHLORIDE 1000 MG: 1 INJECTION, POWDER, LYOPHILIZED, FOR SOLUTION INTRAVENOUS at 21:57

## 2021-10-08 RX ADMIN — Medication 10 MG: at 12:14

## 2021-10-08 RX ADMIN — MIDAZOLAM HYDROCHLORIDE 1 MG: 2 INJECTION, SOLUTION INTRAMUSCULAR; INTRAVENOUS at 08:22

## 2021-10-08 RX ADMIN — SODIUM CHLORIDE, SODIUM LACTATE, POTASSIUM CHLORIDE, AND CALCIUM CHLORIDE: 600; 310; 30; 20 INJECTION, SOLUTION INTRAVENOUS at 12:19

## 2021-10-08 RX ADMIN — COLCHICINE 0.6 MG: 0.6 CAPSULE ORAL at 17:42

## 2021-10-08 RX ADMIN — FENTANYL CITRATE 25 MCG: 50 INJECTION, SOLUTION INTRAMUSCULAR; INTRAVENOUS at 13:23

## 2021-10-08 RX ADMIN — VANCOMYCIN HYDROCHLORIDE 1000 MG: 1 INJECTION, POWDER, LYOPHILIZED, FOR SOLUTION INTRAVENOUS at 07:44

## 2021-10-08 RX ADMIN — VECURONIUM BROMIDE 4 MG: 1 INJECTION, POWDER, LYOPHILIZED, FOR SOLUTION INTRAVENOUS at 12:03

## 2021-10-08 RX ADMIN — PROPOFOL 180 MG: 10 INJECTION, EMULSION INTRAVENOUS at 08:30

## 2021-10-08 RX ADMIN — METOPROLOL TARTRATE 12.5 MG: 25 TABLET, FILM COATED ORAL at 17:42

## 2021-10-08 RX ADMIN — HYDROMORPHONE HYDROCHLORIDE 0.5 MG: 2 INJECTION, SOLUTION INTRAMUSCULAR; INTRAVENOUS; SUBCUTANEOUS at 12:55

## 2021-10-08 RX ADMIN — Medication 3 MG: at 13:14

## 2021-10-08 RX ADMIN — MORPHINE SULFATE 2 MG: 2 INJECTION, SOLUTION INTRAMUSCULAR; INTRAVENOUS at 22:02

## 2021-10-08 RX ADMIN — ALBUMIN (HUMAN) 250 ML: 12.5 SOLUTION INTRAVENOUS at 12:19

## 2021-10-08 RX ADMIN — FENTANYL CITRATE 25 MCG: 50 INJECTION, SOLUTION INTRAMUSCULAR; INTRAVENOUS at 08:30

## 2021-10-08 RX ADMIN — FAMOTIDINE 20 MG: 10 INJECTION INTRAVENOUS at 07:44

## 2021-10-08 RX ADMIN — Medication 20 MG: at 10:20

## 2021-10-08 RX ADMIN — HYDROMORPHONE HYDROCHLORIDE 1 MG: 2 INJECTION, SOLUTION INTRAMUSCULAR; INTRAVENOUS; SUBCUTANEOUS at 13:32

## 2021-10-08 RX ADMIN — SODIUM CHLORIDE 75 ML/HR: 900 INJECTION, SOLUTION INTRAVENOUS at 07:20

## 2021-10-08 RX ADMIN — DEXAMETHASONE SODIUM PHOSPHATE 4 MG: 4 INJECTION, SOLUTION INTRAMUSCULAR; INTRAVENOUS at 09:37

## 2021-10-08 RX ADMIN — SODIUM CHLORIDE 1 G: 900 INJECTION, SOLUTION INTRAVENOUS at 09:00

## 2021-10-08 RX ADMIN — Medication 10 ML: at 21:56

## 2021-10-08 RX ADMIN — ROCURONIUM BROMIDE 30 MG: 50 INJECTION INTRAVENOUS at 08:35

## 2021-10-08 RX ADMIN — MIDAZOLAM HYDROCHLORIDE 1 MG: 2 INJECTION, SOLUTION INTRAMUSCULAR; INTRAVENOUS at 08:30

## 2021-10-08 RX ADMIN — SUCCINYLCHOLINE CHLORIDE 100 MG: 20 INJECTION, SOLUTION INTRAMUSCULAR; INTRAVENOUS at 08:30

## 2021-10-08 RX ADMIN — OXYCODONE HYDROCHLORIDE AND ACETAMINOPHEN 1 TABLET: 5; 325 TABLET ORAL at 19:38

## 2021-10-08 RX ADMIN — MUPIROCIN: 20 OINTMENT TOPICAL at 17:43

## 2021-10-08 RX ADMIN — GLYCOPYRROLATE 0.4 MG: 0.2 INJECTION, SOLUTION INTRAMUSCULAR; INTRAVENOUS at 10:18

## 2021-10-08 RX ADMIN — BRIMONIDINE TARTRATE 1 DROP: 2 SOLUTION OPHTHALMIC at 22:10

## 2021-10-08 RX ADMIN — ROCURONIUM BROMIDE 20 MG: 50 INJECTION INTRAVENOUS at 09:22

## 2021-10-08 RX ADMIN — FENTANYL CITRATE 50 MCG: 50 INJECTION, SOLUTION INTRAMUSCULAR; INTRAVENOUS at 10:00

## 2021-10-08 NOTE — OP NOTES
CARDIAC SURGERY OPERATIVE NOTE    10/8/2021      PREOPERATIVE DIAGNOSIS: Persistent atrial fibrillation    POSTOPERATIVE DIAGNOSIS: Persistent atrial fibrillation    PROCEDURE:    1. Convergent hybrid epicardial ablation using a subxiphoid approach with ablation of posterior left atrium up to atrioventricular groove  2. Left VATS with clip occlusion of left atrial appendage (size 35)    ATTENDING SURGEON: Khalif Gregory MD       ASSISTANT: ELIOT Barber, Ac Murguia    ANESTHESIA:  General with dual-lumen endotracheal tube. ANESTHESIOLOGIST:  Laci Barnes MD    ESTIMATED BLOOD LOSS: Less than 50 cc    SPECIMENS REMOVED: none    INDICATIONS:  Kathy Mac is a 70 y.o. male  who presents with persistent atrial fibrillation. Please refer to my consult for further history. The patient was referred for consideration of epicardial ablation as well as possible clip occlusion of the left atrial appendage. I met with the patient preoperatively and explained to him the process and what to expect. I also explained the risks involved. All questions were answered. PROCEDURE: He  was taken to the operating room and was placed on the table in supine position, and after being placed under general anesthesia, was intubated without difficulty. Appropriate monitoring lines were then placed, including a radial arterial line, and a Stokes catheter, as well as R2 defibrillation pads. He was then prepped and draped in sterile fashion from neck to knees. Under fluoroscopic guidance, an esophageal temperature probe was then secured in place two vertebral bodies caudal to the tracheal miquel. An inflatable bag was placed under the patient's left chest in order to properly position the patient for the VATS portion of the procedure. This was left deflated during the epicardial ablation portion of the procedure.     A full Time Out was performed and the Cardiac Surgery Operative Checklist was then reviewed, which included confirming the patient's identity and planned operation, and also that the preoperative antibiotics were appropriate and had been delivered in appropriate timely fashion, and that beta blockers were not contraindicated and were administered within the past 24 hours. A 3 cm longitudinal incision was centered on the xiphoid and was carried down to the xiphoid which was partially removed. Dissection was then carried out onto the pericardium which was opened a distance of approximately 4 cm. A large introducer cannula was carefully placed into the pericardial space along the diaphragmatic surface and moved to the base of the pericardium directed by a videoscope in the cannula. The posterior left atrial surface was examined through the scope, and the coronary sinus, IVC, and pulmonary veins on both sides were identified. Through the introducer cannula, an nContact EpiSense device was then placed and was carefully maneuvered into the pericardium using videoscopic guidance from within the cannula. It was carefully positioned to deliver multiple lesion sets across the entire accessible portion of the posterior left atrium. A total of approximately 35 lesions were made. Inspection revealed good coverage of the whole area without any noticeable gaps. An esophageal temperature probe was used to monitor esophageal temperature throughout each lesion. Cold saline was utilized to flush the area during each and every lesion in order to guard against excessive heat/energy that would potentially affect the esophagus. Exparel was injected into the tissues for postoperative analgesia. The wound was approximated by placing a running absorbable suture in the peritoneum followed by a running absorbable suture in the rectus fascia, and a running absorbable suture in the subcuticular layer. I then performed the VATS portion of the procedure.   Inflatable bag was insufflated and the patient was positioned appropriately. Port sites were placed in the fourth, third, and approximately 7 interspace as per usual.    The LigaSure device was used to open the pericardium posterior to the phrenic nerve. At all times the the phrenic was kept well away from the pericardiotomy. The pericardium was opened from near the apex of the left ventricle all the way up to the left pulmonary artery region. Lifting the anterior portion of the pericardium, the appendage was exposed. It appeared to be fairly small at its base. It was measured in the size 35 Atricure clip was used. This was delivered onto the tip of the appendage which was then teased in to the clip until the clip was at the base of the appendage. A clip was then temporarily closed and KEEGAN was used to confirm proper placement. There was no flow present on echo and there was essentially no residual stump whatsoever. The clip was then deployed without incident. A large Simón drain was placed via one of the port sites and the wounds were irrigated and closed in layers. Sterile dressings were applied. He tolerated the procedure well and was transported extubated to the intensive care unit in stable condition. Needle and sponge counts were correct following the procedure. There were no complications. I was present in the operating room from the time the patient arrived until he left the room and performed the entire procedure as dictated above.       Roxanna Roman MD [facility]  Chief, Cardiothoracic Surgery   [facility]  [phone]

## 2021-10-08 NOTE — PROGRESS NOTES
Problem: Falls - Risk of  Goal: *Absence of Falls  Description: Document Margo Saavedra Fall Risk and appropriate interventions in the flowsheet. Outcome: Progressing Towards Goal  Note: Fall Risk Interventions:  Mobility Interventions: Bed/chair exit alarm, Communicate number of staff needed for ambulation/transfer, Patient to call before getting OOB         Medication Interventions: Patient to call before getting OOB, Evaluate medications/consider consulting pharmacy    Elimination Interventions: Call light in reach, Toileting schedule/hourly rounds, Patient to call for help with toileting needs              Problem: Patient Education: Go to Patient Education Activity  Goal: Patient/Family Education  Outcome: Progressing Towards Goal     Problem: Pressure Injury - Risk of  Goal: *Prevention of pressure injury  Description: Document Shlomo Scale and appropriate interventions in the flowsheet. Outcome: Progressing Towards Goal  Note: Pressure Injury Interventions:  Sensory Interventions: Assess changes in LOC, Keep linens dry and wrinkle-free, Turn and reposition approx. every two hours (pillows and wedges if needed)         Activity Interventions: Pressure redistribution bed/mattress(bed type), Increase time out of bed    Mobility Interventions: Pressure redistribution bed/mattress (bed type), Turn and reposition approx.  every two hours(pillow and wedges)    Nutrition Interventions: Document food/fluid/supplement intake                     Problem: Patient Education: Go to Patient Education Activity  Goal: Patient/Family Education  Outcome: Progressing Towards Goal

## 2021-10-08 NOTE — ANESTHESIA PREPROCEDURE EVALUATION
Anesthetic History     PONV          Review of Systems / Medical History  Patient summary reviewed, nursing notes reviewed and pertinent labs reviewed    Pulmonary  Within defined limits                 Neuro/Psych             Comments: 04/2021 Bilateral less than 50% stenosis of the internal carotid arteries Cardiovascular    Hypertension        Dysrhythmias : atrial fibrillation  CAD, cardiac stents and hyperlipidemia      Comments: 07/2021 ECHO  Estimated LVEF is 55 - 60%   GI/Hepatic/Renal     GERD    Renal disease: CRI       Endo/Other        Obesity     Other Findings              Physical Exam    Airway  Mallampati: III  TM Distance: 4 - 6 cm  Neck ROM: normal range of motion   Mouth opening: Normal     Cardiovascular    Rhythm: irregular           Dental    Dentition: Lower dentition intact and Upper dentition intact     Pulmonary  Breath sounds clear to auscultation               Abdominal  GI exam deferred       Other Findings            Anesthetic Plan    ASA: 3  Anesthesia type: general    Monitoring Plan: Arterial line        Anesthetic plan and risks discussed with: Patient and Family

## 2021-10-08 NOTE — PROGRESS NOTES
Brimonidine (Alphagan) 0.2% was therapeutically interchanged for Brimonidine (Alphagan P) 0.1% per the P&T Committee approved Therapeutic Interchanges Policy.

## 2021-10-08 NOTE — ANESTHESIA POSTPROCEDURE EVALUATION
Procedure(s):  EPICARDIAL ABLATION (CONVERGENT)/VIDEO ASSISTED THORACOSCOPIC LEFT ATRIAL APPENDAGE CLIP LIGATION  TRANSESOPHAGEAL ECHOCARDIOGRAM.    general    Anesthesia Post Evaluation      Multimodal analgesia: multimodal analgesia used between 6 hours prior to anesthesia start to PACU discharge  Patient location during evaluation: ICU  Patient participation: complete - patient participated  Level of consciousness: awake and alert  Pain management: adequate  Airway patency: patent  Anesthetic complications: no  Cardiovascular status: acceptable and hemodynamically stable  Respiratory status: acceptable  Hydration status: acceptable  Post anesthesia nausea and vomiting:  controlled      INITIAL Post-op Vital signs:   Vitals Value Taken Time   /68 10/08/21 1500   Temp 36.4 °C (97.5 °F) 10/08/21 1336   Pulse 68 10/08/21 1503   Resp 14 10/08/21 1503   SpO2 96 % 10/08/21 1503   Vitals shown include unvalidated device data.

## 2021-10-08 NOTE — ROUTINE PROCESS
TRANSFER - OUT REPORT:    Verbal report given to Dank Reza RN on Brian  being transferred to CVT ICU for routine post - op       Report consisted of patients Situation, Background, Assessment and   Recommendations(SBAR). Information from the following report(s) SBAR, Kardex, OR Summary, Procedure Summary and Recent Results was reviewed with the receiving nurse. Lines:   Peripheral IV 10/08/21 Anterior;Right Hand (Active)   Site Assessment Clean, dry, & intact 10/08/21 1023   Phlebitis Assessment 0 10/08/21 1023   Infiltration Assessment 0 10/08/21 0713   Dressing Status Clean, dry, & intact 10/08/21 1023   Dressing Type Tape;Transparent 10/08/21 0713   Hub Color/Line Status Pink; Infusing;Flushed 10/08/21 0713   Action Taken Dressing reinforced 10/08/21 0713   Alcohol Cap Used No 10/08/21 0713       Peripheral IV 10/08/21 Anterior; Left Hand (Active)   Site Assessment Clean, dry, & intact 10/08/21 1023   Phlebitis Assessment 0 10/08/21 1023   Infiltration Assessment 0 10/08/21 0759   Dressing Status Clean, dry, & intact 10/08/21 1023   Dressing Type Transparent 10/08/21 1023   Hub Color/Line Status Pink 10/08/21 1023   Action Taken Dressing reinforced 10/08/21 0759   Alcohol Cap Used No 10/08/21 0759       Arterial Line 10/08/21 Left Radial artery (Active)        Opportunity for questions and clarification was provided.       Patient transported with:   Monitor  Registered Nurse

## 2021-10-08 NOTE — ANESTHESIA PROCEDURE NOTES
Arterial Line Placement    Start time: 10/8/2021 8:32 AM  End time: 10/8/2021 8:42 AM  Performed by: Elvia Curling  Authorized by: Ariela Siegel MD     Pre-Procedure  Indications:  Arterial pressure monitoring and blood sampling  Preanesthetic Checklist: patient identified, risks and benefits discussed, anesthesia consent, site marked, patient being monitored, timeout performed and patient being monitored    Timeout Time: 08:32 EDT        Procedure:   Prep:  ChloraPrep  Seldinger Technique?: Yes    Orientation:  Left  Location:  Radial artery  Catheter size:  20 G  Number of attempts:  1  Cont Cardiac Output Sensor: No      Assessment:   Post-procedure:  Line secured and sterile dressing applied  Patient Tolerance:  Patient tolerated the procedure well with no immediate complications

## 2021-10-08 NOTE — H&P
History and Physical    Paola Nation MD   Physician   Specialty:  Cardiothoracic Surgery   Progress Notes      Signed   Encounter Date:  9/17/2021                    []Hide copied text    []Zoya for details           [facility]  -  Consult        9/17/2021           Ihsan Dey is a 70 y.o. male who is being seen on consult for atrial fibrillation, at  Dr. Matt Ponce request.        Subjective:           Chief Complaint   Patient presents with    Referral / Consult       A-fib    Irregular Heart Beat         History of Present Illness: The patient is a 66-year-old male who has had atrial fibrillation for the past 2 to 3 years. He is very symptomatic with respect to being in atrial fibrillation. His primary symptom is fatigue and decreased exercise tolerance. He had been cardioverted in 2019 and this worked for quite some time and he felt much better. The atrial fibrillation recurred however. He has had previous ablation in July of this year. This was initially successful and the patient felt great but just recently converted back into atrial fibrillation and is again quite symptomatic.   The patient is referred for consideration of epicardial ablation.     Past Medical History:           Past Medical History:   Diagnosis Date    Cancer ([address]       basal cell    Cholestanol storage disease      GERD (gastroesophageal reflux disease)       well controlled with meds    Hypercholesteremia      Hypertension 65923     5yrs    Kidney stones      Nausea & vomiting      Pneumonia           Past Surgical History:            Past Surgical History:   Procedure Laterality Date    CORONARY STENT EA ADDL VESSEL   12/4/2015    CORONARY STENT SINGLE W/PTCA   12/4/2015    HX ADENOIDECTOMY        HX APPENDECTOMY        HX CERVICAL FUSION   05/02/2012    HX HEENT   2013     sinus surgery    HX OTHER SURGICAL         basal cell removal from forehead    HX OTHER SURGICAL   2012     varicose vein surgery    HX TONSILLECTOMY        HX UROLOGICAL   4-13     lithotripsy    LEFT HEART PERCUTANEOUS   12/4/2015    MN CARDIAC SURG PROCEDURE UNLIST        MN CARDIOVERSION ELECTIVE ARRHYTHMIA EXTERNAL N/A 9/23/2019     EP CARDIOVERSION/KEEGAN prior performed by [facility]argrove [facility] CATH [facility]    RT & LT HEART WITH C. O.   12/3/2017    BRYAN CORONARY ARTERIOGRAPH   12/4/2015    BRYAN CORONARY ARTERIOGRAPH   12/3/2017           Patient Active Problem List     Diagnosis Date Noted    A-fib [facility]) 07/13/2021    Sick sinus syndrome ([facility] 04/08/2021    Bradycardia 04/07/2021    Hypotension 04/07/2021    Acute renal failure superimposed on stage 3 chronic kidney disease ([address] 04/07/2021    Dyspnea 79/69/3607    Diastolic CHF, acute on chronic (HCC) 11/24/2017    Elevated troponin 11/24/2017    CAD (coronary artery disease) 55/00/1045    Diastolic CHF, chronic (HCC) 12/03/2015    NSTEMI (non-ST elevated myocardial infarction) ([facility] 12/02/2015    Cardiomyopathy ([facility] 01/27/2015    GERD (gastroesophageal reflux disease) 01/27/2015    CHF exacerbation ([address] 01/07/2015    Calculus of kidney 05/07/2013    Near syncope 05/06/2013    UTI (urinary tract infection) 05/06/2013    Essential hypertension, benign 05/06/2013      Social History:   He  reports that he has never smoked.  He has never used smokeless tobacco.  He  reports no history of alcohol use.     Family History:            Family History   Problem Relation Age of Onset    Malignant Hyperthermia Neg Hx      Pseudocholinesterase Deficiency Neg Hx      Delayed Awakening Neg Hx      Post-op Nausea/Vomiting Neg Hx      Emergence Delirium Neg Hx           Allergies and Intolerances:           Allergies   Allergen Reactions    Penicillins Rash and Itching         Home Medications:      Cannot display prior to admission medications because the patient has not been admitted in this contact.                Current Outpatient Medications   Medication Sig Dispense Refill    cholecalciferol, vitamin D3, (Vitamin D3) 50 mcg (2,000 unit) tab Take  by mouth.        aspirin delayed-release 81 mg tablet Take  by mouth daily.        irbesartan (AVAPRO) 150 mg tablet          spironolactone (ALDACTONE) 25 mg tablet Take  by mouth daily.        dilTIAZem ER (CARDIZEM CD) 180 mg capsule TAKE 1 CAPSULE BY ORAL ROUTE EVERY DAY        Alphagan P 0.1 % ophthalmic solution          metoprolol tartrate (LOPRESSOR) 25 mg tablet Take 0.5 Tabs by mouth every twelve (12) hours. 60 Tab 1    rivaroxaban (XARELTO) 20 mg tab tablet Take 1 Tab by mouth daily (with dinner). 30 Tab 0    omeprazole (PRILOSEC) 40 mg capsule Take 40 mg by mouth daily.        Cetirizine (ZYRTEC) 10 mg cap Take 10 mg by mouth.        rosuvastatin (CRESTOR) 20 mg tablet Take 20 mg by mouth nightly.       .      Review of Systems:      Except as stated above include:  Constitutional: Negative for fever, chills and malaise/fatigue. HEENT: No congestion or recent URI. Gastrointestinal: No nausea, vomiting, abdominal pain, bloody stools. Pulmonary:  Negative except as stated above. Cardiac:  Negative except as stated above. Musculoskeletal: Negative except as stated above. Neurological:  No localized symptoms. Skin:  Negative except as stated above. Psych:  Negative except as stated above. Endocrine:  Negative except as stated above.     Physical Examination:      Visit Vitals  /65 (BP 1 Location: Right upper arm, BP Patient Position: Sitting, BP Cuff Size: Adult)   Pulse (!) 53   Temp 98 °F (36.7 °C) (Temporal)   Resp 18   Ht 6' (1.829 m)   Wt 103.9 kg (229 lb)   SpO2 97%   BMI 31.06 kg/m²         General:          Well developed, well groomed.    Head/Neck:     No obvious jugular venous distention                           No obvious carotid pulsations.                            No evidence of xanthelasma. No carotid bruits.   Lungs:             OY respiratory distress.                            Clear bilaterally. Heart:              Irreg irreg,  Normal S1/S2.                            Palpation grossly normal.                          No significant murmurs, rubs or gallops. Abdomen:        Non-acute abdomen.                            No obvious pulsations. Extremities:     Intact peripheral pulses.                            No significant edema.    Neurological:   Alert and oriented to person, place, time.                            No focal neurological deficit visually. Skin:                No obvious rash  Laboratory Data:            Lab Results   Component Value Date/Time     WBC 6.1 08/18/2021 10:15 AM     HGB 14.5 08/18/2021 10:15 AM     HCT 41.7 08/18/2021 10:15 AM     PLATELET 879 89/97/4986 10:15 AM            Lab Results   Component Value Date/Time     Sodium 141 07/06/2021 09:49 AM     Potassium 4.4 07/06/2021 09:49 AM     Chloride 109 07/06/2021 09:49 AM     CO2 29 07/06/2021 09:49 AM     Glucose 107 (H) 07/06/2021 09:49 AM     BUN 19 (H) 07/06/2021 09:49 AM     Creatinine 1.34 (H) 07/06/2021 09:49 AM            Lab Results   Component Value Date/Time     Cholesterol, total 159 11/25/2017 01:25 AM     HDL Cholesterol 40 11/25/2017 01:25 AM     LDL, calculated 103.6 (H) 11/25/2017 01:25 AM     Triglyceride 77 11/25/2017 01:25 AM            Lab Results   Component Value Date/Time     Hemoglobin A1c 5.0 11/24/2017 08:50 AM      No results for input(s): CA, PHOS, ALB, TP, ALKP, TBILI in the last 72 hours.     No lab exists for component: SGOT, SGPT, CBILI  ABG:  No results found for: PH, PHI, PCO2, PCO2I, PO2, PO2I, HCO3, HCO3I, FIO2, FIO2I  No results for input(s): TROIQ, CPK, CKMB in the last 72 hours.     EKG: (9/2/2021)  Atrial fibrillation with controlled ventricular rate.      KEEGAN ECHO: (7/13/2021)  Left Ventricle Normal cavity size and systolic function (ejection fraction normal). Wall motion: normal. The estimated EF is 55 - 60%. Visually measured ejection fraction. Left Atrium Normal cavity size. Right Ventricle Normal cavity size and global systolic function. Right Atrium Normal cavity size. Interatrial Septum Agitated saline contrast study was performed. There was no shunting at baseline or with Valsalva. Aortic Valve Normal valve structure, trileaflet valve structure and no stenosis. Trace aortic valve regurgitation. Mitral Valve Normal valve structure and no stenosis. Mild regurgitation. Tricuspid Valve Normal valve structure and no stenosis. Trace regurgitation. Pulmonic Valve Normal valve structure, no stenosis and no regurgitation. Aorta Normal aortic root, ascending aortic, and aortic arch. Normal aortic root. Pulmonary Artery Pulmonary hypertension not suggested by Doppler findings. IVC/Hepatic Veins Normal structure. Pericardium No evidence of pericardial effusion.         CATHETERIZATION: (12/3/2017)     1. Patent large coronary arteries. 2. Patent normal left main artery, patent normal left anterior  descending. 3. Patent normal left circumflex artery. 4. Patent normal stent in the large left circumflex mid artery. 5. Single-vessel, nondominant right coronary artery moderate disease  that will be treated medically. No indication to fix any blockage on the  small nondominant right coronary artery at this point. 6. Left ventricular end-diastolic pressure is 15 mmHg. 7. Mildly elevated right-sided pressure. 8. There is no evidence of any significant pulmonary hypertension.        ASSESSMENT AND PLAN:     I believe that this patient is a good candidate for the convergent procedure. His mitral regurgitation is mild on a recent KEEGAN and his appendage had no evidence of clot.   (Severe mitral regurgitation and/or appendage clot are contraindications to the procedure.)  He is also interested in undergoing clip ligation of the left atrial appendage via left VATS after the epicardial ablation portion of the procedure. I described the procedure in detail including the risks and the expected hospital course. All questions were answered. I discussed with Dr. Mayda Abreu the need for a more recent cardiac catheterization to rule out obstructive coronary disease.   Patient would like to have a \"cath admit\" and as soon as the catheterization is scheduled, we will set the surgery up for the following day.     Thank you for allowing me to participate in the care of this patient.     I spent approximately 90 minutes reviewing this patient's history and studies, meeting with the patient and his wife, discussing with Dr. Mayda Abreu, and developing a plan.     Kelsy Francois MD [facility]  Chief, Cardiothoracic Surgery   [facility]  [phone]                  Electronically signed by Gilford Alexanders, MD at 09/17/21 1327  Note Details    Author Gilford Alexanders, MD File Time 09/17/21 1327   Author Type Physician Status Signed   Last  Gilford Alexanders, MD Specialty Cardiothoracic Surgery    Office Visit on 9/17/2021     Office Visit on 9/17/2021        Detailed Report      Note shared with patient

## 2021-10-09 ENCOUNTER — APPOINTMENT (OUTPATIENT)
Dept: GENERAL RADIOLOGY | Age: 71
DRG: 229 | End: 2021-10-09
Attending: PHYSICIAN ASSISTANT
Payer: MEDICARE

## 2021-10-09 LAB
ANION GAP SERPL CALC-SCNC: 10 MMOL/L (ref 3–18)
ATRIAL RATE: 75 BPM
BUN SERPL-MCNC: 20 MG/DL (ref 7–18)
BUN/CREAT SERPL: 17 (ref 12–20)
CALCIUM SERPL-MCNC: 8 MG/DL (ref 8.5–10.1)
CALCULATED P AXIS, ECG09: 60 DEGREES
CALCULATED R AXIS, ECG10: -12 DEGREES
CALCULATED T AXIS, ECG11: -3 DEGREES
CHLORIDE SERPL-SCNC: 110 MMOL/L (ref 100–111)
CO2 SERPL-SCNC: 23 MMOL/L (ref 21–32)
CREAT SERPL-MCNC: 1.16 MG/DL (ref 0.6–1.3)
DIAGNOSIS, 93000: NORMAL
ERYTHROCYTE [DISTWIDTH] IN BLOOD BY AUTOMATED COUNT: 12.3 % (ref 11.6–14.5)
GLUCOSE BLD STRIP.AUTO-MCNC: 110 MG/DL (ref 70–110)
GLUCOSE BLD STRIP.AUTO-MCNC: 117 MG/DL (ref 70–110)
GLUCOSE BLD STRIP.AUTO-MCNC: 133 MG/DL (ref 70–110)
GLUCOSE SERPL-MCNC: 112 MG/DL (ref 74–99)
HCT VFR BLD AUTO: 41.6 % (ref 36–48)
HGB BLD-MCNC: 14.1 G/DL (ref 13–16)
MCH RBC QN AUTO: 30.7 PG (ref 24–34)
MCHC RBC AUTO-ENTMCNC: 33.9 G/DL (ref 31–37)
MCV RBC AUTO: 90.4 FL (ref 78–100)
P-R INTERVAL, ECG05: 178 MS
PLATELET # BLD AUTO: 148 K/UL (ref 135–420)
PMV BLD AUTO: 12.1 FL (ref 9.2–11.8)
POTASSIUM SERPL-SCNC: 3.6 MMOL/L (ref 3.5–5.5)
Q-T INTERVAL, ECG07: 374 MS
QRS DURATION, ECG06: 96 MS
QTC CALCULATION (BEZET), ECG08: 417 MS
RBC # BLD AUTO: 4.6 M/UL (ref 4.35–5.65)
SODIUM SERPL-SCNC: 143 MMOL/L (ref 136–145)
VENTRICULAR RATE, ECG03: 75 BPM
WBC # BLD AUTO: 13.4 K/UL (ref 4.6–13.2)

## 2021-10-09 PROCEDURE — 74011250636 HC RX REV CODE- 250/636: Performed by: PHYSICIAN ASSISTANT

## 2021-10-09 PROCEDURE — 80048 BASIC METABOLIC PNL TOTAL CA: CPT

## 2021-10-09 PROCEDURE — 94762 N-INVAS EAR/PLS OXIMTRY CONT: CPT

## 2021-10-09 PROCEDURE — 93005 ELECTROCARDIOGRAM TRACING: CPT

## 2021-10-09 PROCEDURE — 65620000000 HC RM CCU GENERAL

## 2021-10-09 PROCEDURE — 74011250637 HC RX REV CODE- 250/637: Performed by: PHYSICIAN ASSISTANT

## 2021-10-09 PROCEDURE — 74011250636 HC RX REV CODE- 250/636

## 2021-10-09 PROCEDURE — APPNB45 APP NON BILLABLE 31-45 MINUTES: Performed by: PHYSICIAN ASSISTANT

## 2021-10-09 PROCEDURE — 85027 COMPLETE CBC AUTOMATED: CPT

## 2021-10-09 PROCEDURE — 74011250636 HC RX REV CODE- 250/636: Performed by: SPECIALIST

## 2021-10-09 PROCEDURE — 82962 GLUCOSE BLOOD TEST: CPT

## 2021-10-09 PROCEDURE — 99024 POSTOP FOLLOW-UP VISIT: CPT | Performed by: PHYSICIAN ASSISTANT

## 2021-10-09 PROCEDURE — 71045 X-RAY EXAM CHEST 1 VIEW: CPT

## 2021-10-09 RX ORDER — POTASSIUM CHLORIDE 7.45 MG/ML
10 INJECTION INTRAVENOUS
Status: COMPLETED | OUTPATIENT
Start: 2021-10-09 | End: 2021-10-09

## 2021-10-09 RX ORDER — POTASSIUM CHLORIDE 7.45 MG/ML
INJECTION INTRAVENOUS
Status: COMPLETED
Start: 2021-10-09 | End: 2021-10-09

## 2021-10-09 RX ORDER — DOFETILIDE 0.25 MG/1
500 CAPSULE ORAL EVERY 12 HOURS
Status: DISCONTINUED | OUTPATIENT
Start: 2021-10-09 | End: 2021-10-11 | Stop reason: HOSPADM

## 2021-10-09 RX ORDER — SODIUM CHLORIDE 9 MG/ML
75 INJECTION, SOLUTION INTRAVENOUS CONTINUOUS
Status: DISPENSED | OUTPATIENT
Start: 2021-10-09 | End: 2021-10-10

## 2021-10-09 RX ADMIN — Medication 10 ML: at 05:46

## 2021-10-09 RX ADMIN — SODIUM CHLORIDE 75 ML/HR: 900 INJECTION, SOLUTION INTRAVENOUS at 12:00

## 2021-10-09 RX ADMIN — POTASSIUM CHLORIDE 10 MEQ: 7.46 INJECTION, SOLUTION INTRAVENOUS at 18:12

## 2021-10-09 RX ADMIN — POTASSIUM CHLORIDE 10 MEQ: 7.46 INJECTION, SOLUTION INTRAVENOUS at 19:37

## 2021-10-09 RX ADMIN — MORPHINE SULFATE 2 MG: 2 INJECTION, SOLUTION INTRAMUSCULAR; INTRAVENOUS at 07:01

## 2021-10-09 RX ADMIN — Medication 10 ML: at 15:04

## 2021-10-09 RX ADMIN — Medication 81 MG: at 08:13

## 2021-10-09 RX ADMIN — HEPARIN SODIUM 5000 UNITS: 5000 INJECTION INTRAVENOUS; SUBCUTANEOUS at 02:04

## 2021-10-09 RX ADMIN — METOPROLOL TARTRATE 12.5 MG: 25 TABLET, FILM COATED ORAL at 20:42

## 2021-10-09 RX ADMIN — BRIMONIDINE TARTRATE 1 DROP: 2 SOLUTION OPHTHALMIC at 08:14

## 2021-10-09 RX ADMIN — MUPIROCIN: 20 OINTMENT TOPICAL at 08:14

## 2021-10-09 RX ADMIN — MORPHINE SULFATE 2 MG: 2 INJECTION, SOLUTION INTRAMUSCULAR; INTRAVENOUS at 19:51

## 2021-10-09 RX ADMIN — OXYCODONE HYDROCHLORIDE AND ACETAMINOPHEN 2 TABLET: 5; 325 TABLET ORAL at 05:46

## 2021-10-09 RX ADMIN — MUPIROCIN: 20 OINTMENT TOPICAL at 18:15

## 2021-10-09 RX ADMIN — OXYCODONE HYDROCHLORIDE AND ACETAMINOPHEN 2 TABLET: 5; 325 TABLET ORAL at 00:33

## 2021-10-09 RX ADMIN — Medication 10 ML: at 21:49

## 2021-10-09 RX ADMIN — BRIMONIDINE TARTRATE 1 DROP: 2 SOLUTION OPHTHALMIC at 20:44

## 2021-10-09 RX ADMIN — OXYCODONE HYDROCHLORIDE AND ACETAMINOPHEN 1 TABLET: 5; 325 TABLET ORAL at 17:07

## 2021-10-09 RX ADMIN — MORPHINE SULFATE 2 MG: 2 INJECTION, SOLUTION INTRAMUSCULAR; INTRAVENOUS at 16:20

## 2021-10-09 RX ADMIN — COLCHICINE 0.6 MG: 0.6 CAPSULE ORAL at 08:12

## 2021-10-09 RX ADMIN — DOFETILIDE 500 MCG: 0.25 CAPSULE ORAL at 18:13

## 2021-10-09 RX ADMIN — COLCHICINE 0.6 MG: 0.6 CAPSULE ORAL at 17:07

## 2021-10-09 RX ADMIN — HEPARIN SODIUM 5000 UNITS: 5000 INJECTION INTRAVENOUS; SUBCUTANEOUS at 15:04

## 2021-10-09 RX ADMIN — METOPROLOL TARTRATE 12.5 MG: 25 TABLET, FILM COATED ORAL at 08:12

## 2021-10-09 RX ADMIN — OXYCODONE HYDROCHLORIDE AND ACETAMINOPHEN 2 TABLET: 5; 325 TABLET ORAL at 21:48

## 2021-10-09 RX ADMIN — SODIUM CHLORIDE 75 ML/HR: 900 INJECTION, SOLUTION INTRAVENOUS at 03:00

## 2021-10-09 RX ADMIN — MORPHINE SULFATE 2 MG: 2 INJECTION, SOLUTION INTRAMUSCULAR; INTRAVENOUS at 02:04

## 2021-10-09 RX ADMIN — BISACODYL 10 MG: 5 TABLET, COATED ORAL at 08:14

## 2021-10-09 NOTE — PROGRESS NOTES
0- Report received from [place] St[place]. Will assume care of the patient. 1200- Pt resting comfortably in recliner. RR even and unlabored. Pt remains in SR with occasional PVC's. Pt denies pain at this time. Surgical site clean, dry and intact. No patient needs at this time. Call bell within reach. 1230- Thompson catheter and arterial line removed per Maite Mcguire. Will also restart normal saline per provider. 1300- Initial 12 lead completed prior to Tikosyn administration. QTc 417/     1415- Pt ambulated with RN around nursing station twice. Pt tolerated walk well and is resting back in his recliner. 1500- Pt successfully voided following thompson catheter removal.     1816- Tikosyn given, recheck EKG @ 20:16    1900-Report given to [address], RN.

## 2021-10-09 NOTE — PROGRESS NOTES
Problem: Falls - Risk of  Goal: *Absence of Falls  Description: Document Karen Blood Fall Risk and appropriate interventions in the flowsheet. Outcome: Progressing Towards Goal  Note: Fall Risk Interventions:  Mobility Interventions: Assess mobility with egress test, Communicate number of staff needed for ambulation/transfer, Patient to call before getting OOB         Medication Interventions: Bed/chair exit alarm, Evaluate medications/consider consulting pharmacy    Elimination Interventions: Bed/chair exit alarm, Call light in reach, Toileting schedule/hourly rounds              Problem: Patient Education: Go to Patient Education Activity  Goal: Patient/Family Education  Outcome: Progressing Towards Goal     Problem: Pressure Injury - Risk of  Goal: *Prevention of pressure injury  Description: Document Shlomo Scale and appropriate interventions in the flowsheet. Outcome: Progressing Towards Goal  Note: Pressure Injury Interventions:  Sensory Interventions: Assess changes in LOC, Assess need for specialty bed, Avoid rigorous massage over bony prominences, Keep linens dry and wrinkle-free, Maintain/enhance activity level, Minimize linen layers, Monitor skin under medical devices, Pressure redistribution bed/mattress (bed type), Turn and reposition approx. every two hours (pillows and wedges if needed)         Activity Interventions: Pressure redistribution bed/mattress(bed type), Assess need for specialty bed    Mobility Interventions: Assess need for specialty bed, HOB 30 degrees or less, Pressure redistribution bed/mattress (bed type), Turn and reposition approx.  every two hours(pillow and wedges)    Nutrition Interventions: Document food/fluid/supplement intake

## 2021-10-09 NOTE — PROGRESS NOTES
Problem: Falls - Risk of  Goal: *Absence of Falls  Description: Document Linda Dinh Fall Risk and appropriate interventions in the flowsheet. Outcome: Progressing Towards Goal  Note: Fall Risk Interventions:  Mobility Interventions: Assess mobility with egress test, Communicate number of staff needed for ambulation/transfer, Patient to call before getting OOB, PT Consult for mobility concerns, PT Consult for assist device competence, Strengthening exercises (ROM-active/passive)         Medication Interventions: Assess postural VS orthostatic hypotension, Evaluate medications/consider consulting pharmacy, Patient to call before getting OOB, Teach patient to arise slowly    Elimination Interventions: Call light in reach, Elevated toilet seat, Patient to call for help with toileting needs, Stay With Me (per policy), Toilet paper/wipes in reach, Toileting schedule/hourly rounds              Problem: Patient Education: Go to Patient Education Activity  Goal: Patient/Family Education  Outcome: Progressing Towards Goal     Problem: Pressure Injury - Risk of  Goal: *Prevention of pressure injury  Description: Document Shlomo Scale and appropriate interventions in the flowsheet. Outcome: Progressing Towards Goal  Note: Pressure Injury Interventions:  Sensory Interventions: Assess changes in LOC, Assess need for specialty bed, Avoid rigorous massage over bony prominences, Keep linens dry and wrinkle-free, Maintain/enhance activity level, Minimize linen layers, Monitor skin under medical devices, Pressure redistribution bed/mattress (bed type), Turn and reposition approx.  every two hours (pillows and wedges if needed)         Activity Interventions: Assess need for specialty bed, Chair cushion, Increase time out of bed, Pressure redistribution bed/mattress(bed type)    Mobility Interventions: Assess need for specialty bed, Chair cushion, Pressure redistribution bed/mattress (bed type), PT/OT evaluation, Turn and reposition approx.  every two hours(pillow and wedges)    Nutrition Interventions: Document food/fluid/supplement intake, Discuss nutritional consult with provider

## 2021-10-09 NOTE — PROGRESS NOTES
1900: Bedside and Verbal shift change report given to Anastasia Sprague RN (oncoming nurse) by Mel Martinez RN (offgoing nurse). Report included the following information SBAR, Kardex, OR Summary, Procedure Summary, Intake/Output, MAR, Recent Results, Cardiac Rhythm SR, Alarm Parameters , Quality Measures and Dual Neuro Assessment. 1938: Assessment completed. VSS. C/o 5/10 pain to chest incision and chest tube site. Given PRN pain medication. Chest tube in place and draining. All dressings are C/D/I. Stokes in place and draining. Palpable pedal pulses. 2200: Meds given. 0000: Reassessment completed. VSS. No changes. 0033: PRN pain medication given. 0130: Pt. Asleep.    0200: X-ray at bedside. Meds given    0400: Reassessment completed. VSS. No changes. 1237: Labs drawn. Meds given. Refused offer of bath and up to chair at this time. Requesting to wait until pain medication starts working. 2723: Standing weight obtained. Up to chair. 0700: Bedside and Verbal shift change report given to Jerry RN (oncoming nurse) by Anastasia Sprague RN (offgoing nurse). Report included the following information SBAR, Kardex, ED Summary, Procedure Summary, Intake/Output, MAR, Cardiac Rhythm SR, Alarm Parameters , Quality Measures and Dual Neuro Assessment.

## 2021-10-09 NOTE — PROGRESS NOTES
CARDIOTHORACIC SURGERY PROGRESS NOTE    10/9/2021  11:52 AM     Post Operative Day # 1     Chart reviewed. *    Interval History/Events of Past 24 hours:   Remains in SR. Pain control issues improved. No 02. Labs and CXR ok. Tube is draining light serosang fluid. Subjective:  Patient seen and examined on rounds today. Pain level: moderate-left chest procedural  Ambulating:  well   Sleeping:  well  Eating:   well    Objective:  Vital signs:   Visit Vitals  BP (!) 100/54   Pulse 71   Temp 97.7 °F (36.5 °C)   Resp 20   Ht 6' (1.829 m)   Wt 104.7 kg (230 lb 14.4 oz)   SpO2 94%   BMI 31.32 kg/m²     Temp (24hrs), Av.7 °F (36.5 °C), Min:97.5 °F (36.4 °C), Max:98 °F (36.7 °C)    Admission Weight: Last Weight   Weight: 102.1 kg (225 lb) Weight: 104.7 kg (230 lb 14.4 oz)     Physical Examination:     General:  Alert, oriented   Lungs: dull at bases but otherwise clear to ascultation without rales, wheezes or rhonchi. Inspiratory effort Good. Chest:  Dressings clean and dry. Incisions healing well. No SC air. Heart:  RRR with soft murmur. Abdomen: Active sounds. Soft and non-tender without masses. Extremities:    Warm and well perfused. Neuro: No deficit. DVT Prophylaxis:   pneumatic compression boots: Yes  Compression stockings:  Yes  Heparin:  Yes    Chest tubes: Air leak:not present                         Drainage:  740/OR                         Suction:   20cm H2O          Labs:  Lab Results   Component Value Date/Time    WBC 13.4 (H) 10/09/2021 05:45 AM    HCT 41.6 10/09/2021 05:45 AM     10/09/2021 05:45 AM      Lab Results   Component Value Date/Time     10/09/2021 05:45 AM    K 3.6 10/09/2021 05:45 AM     10/09/2021 05:45 AM    CO2 23 10/09/2021 05:45 AM     (H) 10/09/2021 05:45 AM    BUN 20 (H) 10/09/2021 05:45 AM    CREA 1.16 10/09/2021 05:45 AM    CREA 1.94 (H) 10/01/2021 01:01 PM    CREA 1.34 (H) 2021 09:49 AM           CXR: no effusions or ptx. Left chest tube in place     Assessment:  S/P  Convergent epicardial ablation, left VATS for Atriclip  Respiratory parameters stable  Cardiac status stable     Plans:  1. Remove Stokes and A-line. Continue chest tube until drainage tapers  2. Keep IVF until adequate PO  3. Discussed with Dr. Raven Marie who asked for Tikosyn load. Updated Dr. Lamin Cam on call this weekend. 4.  Colchicine started yesterday BID while in hospital, then daily. Alden Meredith PA-C    PLEASE NOTE:  This document has been produced using voice recognition software. Unrecognized errors in transcription may be present.

## 2021-10-10 ENCOUNTER — APPOINTMENT (OUTPATIENT)
Dept: GENERAL RADIOLOGY | Age: 71
DRG: 229 | End: 2021-10-10
Attending: PHYSICIAN ASSISTANT
Payer: MEDICARE

## 2021-10-10 LAB
GLUCOSE BLD STRIP.AUTO-MCNC: 110 MG/DL (ref 70–110)
GLUCOSE BLD STRIP.AUTO-MCNC: 116 MG/DL (ref 70–110)
GLUCOSE BLD STRIP.AUTO-MCNC: 129 MG/DL (ref 70–110)
MAGNESIUM SERPL-MCNC: 1.9 MG/DL (ref 1.6–2.6)
POTASSIUM SERPL-SCNC: 3.8 MMOL/L (ref 3.5–5.5)

## 2021-10-10 PROCEDURE — 74011250637 HC RX REV CODE- 250/637: Performed by: PHYSICIAN ASSISTANT

## 2021-10-10 PROCEDURE — 71045 X-RAY EXAM CHEST 1 VIEW: CPT

## 2021-10-10 PROCEDURE — 99223 1ST HOSP IP/OBS HIGH 75: CPT | Performed by: INTERNAL MEDICINE

## 2021-10-10 PROCEDURE — APPNB45 APP NON BILLABLE 31-45 MINUTES: Performed by: PHYSICIAN ASSISTANT

## 2021-10-10 PROCEDURE — 82962 GLUCOSE BLOOD TEST: CPT

## 2021-10-10 PROCEDURE — 99024 POSTOP FOLLOW-UP VISIT: CPT | Performed by: PHYSICIAN ASSISTANT

## 2021-10-10 PROCEDURE — 84132 ASSAY OF SERUM POTASSIUM: CPT

## 2021-10-10 PROCEDURE — 83735 ASSAY OF MAGNESIUM: CPT

## 2021-10-10 PROCEDURE — 65660000004 HC RM CVT STEPDOWN

## 2021-10-10 PROCEDURE — 36415 COLL VENOUS BLD VENIPUNCTURE: CPT

## 2021-10-10 PROCEDURE — 93005 ELECTROCARDIOGRAM TRACING: CPT

## 2021-10-10 PROCEDURE — 74011250636 HC RX REV CODE- 250/636: Performed by: PHYSICIAN ASSISTANT

## 2021-10-10 RX ORDER — MAGNESIUM SULFATE HEPTAHYDRATE 40 MG/ML
2 INJECTION, SOLUTION INTRAVENOUS ONCE
Status: COMPLETED | OUTPATIENT
Start: 2021-10-10 | End: 2021-10-10

## 2021-10-10 RX ORDER — ROSUVASTATIN CALCIUM 20 MG/1
20 TABLET, COATED ORAL
Status: DISCONTINUED | OUTPATIENT
Start: 2021-10-10 | End: 2021-10-11 | Stop reason: HOSPADM

## 2021-10-10 RX ADMIN — OXYCODONE HYDROCHLORIDE AND ACETAMINOPHEN 2 TABLET: 5; 325 TABLET ORAL at 22:05

## 2021-10-10 RX ADMIN — COLCHICINE 0.6 MG: 0.6 CAPSULE ORAL at 18:05

## 2021-10-10 RX ADMIN — DOFETILIDE 500 MCG: 0.25 CAPSULE ORAL at 06:37

## 2021-10-10 RX ADMIN — METOPROLOL TARTRATE 12.5 MG: 25 TABLET, FILM COATED ORAL at 20:13

## 2021-10-10 RX ADMIN — OXYCODONE HYDROCHLORIDE AND ACETAMINOPHEN 2 TABLET: 5; 325 TABLET ORAL at 10:27

## 2021-10-10 RX ADMIN — BISACODYL 10 MG: 5 TABLET, COATED ORAL at 08:28

## 2021-10-10 RX ADMIN — DOFETILIDE 500 MCG: 0.25 CAPSULE ORAL at 18:05

## 2021-10-10 RX ADMIN — MORPHINE SULFATE 2 MG: 2 INJECTION, SOLUTION INTRAMUSCULAR; INTRAVENOUS at 14:06

## 2021-10-10 RX ADMIN — BRIMONIDINE TARTRATE 1 DROP: 2 SOLUTION OPHTHALMIC at 08:29

## 2021-10-10 RX ADMIN — Medication 81 MG: at 08:29

## 2021-10-10 RX ADMIN — Medication 10 ML: at 14:11

## 2021-10-10 RX ADMIN — POTASSIUM BICARBONATE 20 MEQ: 391 TABLET, EFFERVESCENT ORAL at 12:11

## 2021-10-10 RX ADMIN — OXYCODONE HYDROCHLORIDE AND ACETAMINOPHEN 2 TABLET: 5; 325 TABLET ORAL at 14:09

## 2021-10-10 RX ADMIN — BRIMONIDINE TARTRATE 1 DROP: 2 SOLUTION OPHTHALMIC at 20:11

## 2021-10-10 RX ADMIN — OXYCODONE HYDROCHLORIDE AND ACETAMINOPHEN 2 TABLET: 5; 325 TABLET ORAL at 05:19

## 2021-10-10 RX ADMIN — ROSUVASTATIN CALCIUM 20 MG: 20 TABLET, COATED ORAL at 22:05

## 2021-10-10 RX ADMIN — METOPROLOL TARTRATE 12.5 MG: 25 TABLET, FILM COATED ORAL at 08:28

## 2021-10-10 RX ADMIN — RIVAROXABAN 20 MG: 20 TABLET, FILM COATED ORAL at 18:04

## 2021-10-10 RX ADMIN — MAGNESIUM SULFATE 2 G: 2 INJECTION INTRAVENOUS at 12:11

## 2021-10-10 RX ADMIN — COLCHICINE 0.6 MG: 0.6 CAPSULE ORAL at 08:29

## 2021-10-10 RX ADMIN — OXYCODONE HYDROCHLORIDE AND ACETAMINOPHEN 2 TABLET: 5; 325 TABLET ORAL at 18:05

## 2021-10-10 RX ADMIN — HEPARIN SODIUM 5000 UNITS: 5000 INJECTION INTRAVENOUS; SUBCUTANEOUS at 03:47

## 2021-10-10 NOTE — PROGRESS NOTES
CARDIOTHORACIC SURGERY PROGRESS NOTE    10/10/2021  10:31 AM     Post Operative Day # 2     Chart reviewed. Interval History/Events of Past 24 hours:   Chest tube still draining light serosang fluid. Tikosyn load in process. Remains in SR with some reported wide complex runs. K 3.8, Mg 1.9    Subjective:  Patient seen and examined on rounds today. Pain level: moderate  Ambulating:  well   Sleeping:  well  Eating:   well    Objective:  Vital signs:   Visit Vitals  BP (!) 120/56   Pulse 68   Temp 99.5 °F (37.5 °C)   Resp 20   Ht 6' (1.829 m)   Wt 104.9 kg (231 lb 4.8 oz)   SpO2 93%   BMI 31.37 kg/m²     Temp (24hrs), Av.9 °F (37.2 °C), Min:98.4 °F (36.9 °C), Max:99.5 °F (37.5 °C)    Admission Weight: Last Weight   Weight: 102.1 kg (225 lb) Weight: 104.9 kg (231 lb 4.8 oz)     Physical Examination:     General:  Alert, oriented   Lungs: Clear to ascultation without rales, wheezes or rhonchi. Inspiratory effort Good. Chest:  Dressings clean and dry. Incisions healing well. No SC air. Heart:  RRR without rub or murmur. Abdomen: Active sounds. Soft and non-tender without masses. Extremities:  Edema not present. Warm and well perfused. Neuro: No deficit. Chest tubes: Air leak:not present                         Drainage:  4320/24 hours                         Suction:   20cm H2O    DME needs:  TBD-  HHC orders written           Assessment:  S/P Convergent and left VATS for Atriclip application  PAF  HTN    Plans:  1. Continue chest drain until drainage tapers. 2.  Continue Tikosyn load. Replace K and Mg  3. Resume [address]. Stop heparin prophylaxis  4. Resume home HNT meds as needed. 5.  Colchicine bid while in hospital, then daily for 2 weeks  6. OK for stepdown. Home when Tikosyn load complete    Shawn Wilder PA-C    PLEASE NOTE:  This document has been produced using voice recognition software. Unrecognized errors in transcription may be present.

## 2021-10-10 NOTE — PROGRESS NOTES
Problem: Falls - Risk of  Goal: *Absence of Falls  Description: Document Green Salvia Fall Risk and appropriate interventions in the flowsheet. Outcome: Progressing Towards Goal  Note: Fall Risk Interventions:  Mobility Interventions: Communicate number of staff needed for ambulation/transfer, Strengthening exercises (ROM-active/passive), Utilize walker, cane, or other assistive device, Patient to call before getting OOB         Medication Interventions: Assess postural VS orthostatic hypotension, Patient to call before getting OOB, Evaluate medications/consider consulting pharmacy, Teach patient to arise slowly    Elimination Interventions: Call light in reach, Elevated toilet seat, Patient to call for help with toileting needs, Urinal in reach, Toileting schedule/hourly rounds, Toilet paper/wipes in reach, Stay With Me (per policy)              Problem: Pressure Injury - Risk of  Goal: *Prevention of pressure injury  Description: Document Shlomo Scale and appropriate interventions in the flowsheet. Outcome: Progressing Towards Goal  Note: Pressure Injury Interventions:  Sensory Interventions: Assess changes in LOC, Avoid rigorous massage over bony prominences         Activity Interventions: Assess need for specialty bed, Chair cushion, Increase time out of bed    Mobility Interventions: Assess need for specialty bed, Pressure redistribution bed/mattress (bed type), Chair cushion, Turn and reposition approx.  every two hours(pillow and wedges)    Nutrition Interventions: Document food/fluid/supplement intake, Discuss nutritional consult with provider

## 2021-10-10 NOTE — PROGRESS NOTES
Problem: Falls - Risk of  Goal: *Absence of Falls  Description: Document Jeb Pedraza Fall Risk and appropriate interventions in the flowsheet.   Outcome: Progressing Towards Goal  Note: Fall Risk Interventions:  Mobility Interventions: Assess mobility with egress test, Patient to call before getting OOB         Medication Interventions: Patient to call before getting OOB, Teach patient to arise slowly    Elimination Interventions: Call light in reach, Patient to call for help with toileting needs, Stay With Me (per policy), Toileting schedule/hourly rounds, Urinal in reach

## 2021-10-10 NOTE — CONSULTS
Cardiology Consult Note    Consultation request by Viridiana Navarrete MD for advice/opinion related to evaluating     Date of  Admission: 10/8/2021  6:34 AM   Primary Care Physician:  Moris De La Rosa MD     Assessment:     -Persistent atrial fibrillation status post external cardioversion in 2019, PVI via cryoballoon in 2021  -Admitted for1. Convergent hybrid epicardial ablation using a subxiphoid approach with ablation of posterior left atrium up to atrioventricular groove  2. Left VATS with clip occlusion of left atrial appendage (size 35). Patient tolerated procedure without any complication  -Started on Tikosyn post procedure for maintenance of rhythm  -Hx of sick sinus syndrome limiting AV blocking agents with subcutaneous loop recorder placed at the time of his ablation July 13, 2021. No significant episodes of bradycardia or pauses. Plan:     Patient tolerated epicardial ablation without any problem. Left atrial appendage occluded with clip  Without any angina or heart failure at this time  Currently on Tikosyn for maintenance of sinus rhythm according to protocol explained by EP colleague  QTC is within acceptable range  Currently on aspirin, dofetilide, metoprolol, Crestor  Xarelto started by CT surgery team.     History of Present Illness: This is a 70 y.o. male admitted for Atrial fibrillation, unspecified type ([address] [I48.91];CAD (coronary artery disease) [I25.10]. Patient complains of:   Patient underwent scheduled epicardial ablation for atrial fibrillation along with left atrial appendage clip occlusion on Friday without any event  We were asked to manage atrial fibrillation Elizabeth Dumont procedurally  Patient denies any significant complaint. Denies any chest pain or chest tightness concerning for angina.   Denies any palpitation, presyncope or syncope    Review of Symptoms:  Except as stated above include:  Constitutional:  negative  Respiratory:  negative  Cardiovascular: negative  Gastrointestinal: negative  Genitourinary:  negative  Musculoskeletal:  Negative  Neurological:  Negative  Dermatological:  Negative  Endocrinological: Negative  Psychological:  Negative    A comprehensive review of systems was negative except for that written in the HPI. Past Medical History:     Past Medical History:   Diagnosis Date    Cancer ([facility]     basal cell    Cholestanol storage disease     GERD (gastroesophageal reflux disease)     well controlled with meds    Hypercholesteremia     Hypertension 30859    5yrs    Kidney stones     Nausea & vomiting     Pneumonia          Social History:     Social History     Socioeconomic History    Marital status:      Spouse name: Not on file    Number of children: Not on file    Years of education: Not on file    Highest education level: Not on file   Tobacco Use    Smoking status: Never Smoker    Smokeless tobacco: Never Used   Substance and Sexual Activity    Alcohol use: No    Drug use: Never     Social Determinants of Health     Financial Resource Strain:     Difficulty of Paying Living Expenses:    Food Insecurity:     Worried About Running Out of Food in the Last Year:     Ran Out of Food in the Last Year:    Transportation Needs:     Lack of Transportation (Medical):      Lack of Transportation (Non-Medical):    Physical Activity:     Days of Exercise per Week:     Minutes of Exercise per Session:    Stress:     Feeling of Stress :    Social Connections:     Frequency of Communication with Friends and Family:     Frequency of Social Gatherings with Friends and Family:     Attends Gnosticism Services:     Active Member of Clubs or Organizations:     Attends Club or Organization Meetings:     Marital Status:         Family History:     Family History   Problem Relation Age of Onset    Malignant Hyperthermia Neg Hx     Pseudocholinesterase Deficiency Neg Hx     Delayed Awakening Neg Hx     Post-op Nausea/Vomiting Neg Hx     Emergence Delirium Neg Hx         Medications:      Allergies   Allergen Reactions    Penicillins Rash and Itching        Current Facility-Administered Medications   Medication Dose Route Frequency    rivaroxaban (XARELTO) tablet 20 mg  20 mg Oral DAILY WITH DINNER    rosuvastatin (CRESTOR) tablet 20 mg  20 mg Oral QHS    potassium bicarb-citric acid (EFFER-K) tablet 20 mEq  20 mEq Oral ONCE    magnesium sulfate 2 g/50 ml IVPB (premix or compounded)  2 g IntraVENous ONCE    dofetilide (TIKOSYN) capsule 500 mcg  500 mcg Oral Q12H    Dofetilide (TIKOSYN): Need EKG 2-3 hours after dose given  1 Each Other Q12H    ELECTROLYTE REPLACEMENT PROTOCOL - Magnesium  1 Each Other PRN    ELECTROLYTE REPLACEMENT PROTOCOL - Potassium   1 Each Other PRN    sodium chloride (NS) flush 5-40 mL  5-40 mL IntraVENous Q8H    sodium chloride (NS) flush 5-40 mL  5-40 mL IntraVENous PRN    oxyCODONE-acetaminophen (PERCOCET) 5-325 mg per tablet 1-2 Tablet  1-2 Tablet Oral Q4H PRN    naloxone (NARCAN) injection 0.4 mg  0.4 mg IntraVENous PRN    bisacodyL (DULCOLAX) tablet 10 mg  10 mg Oral DAILY    morphine injection 2 mg  2 mg IntraVENous Q2H PRN    brimonidine (ALPHAGAN) 0.2 % ophthalmic solution 1 Drop  1 Drop Left Eye BID    aspirin delayed-release tablet 81 mg  81 mg Oral DAILY    metoprolol tartrate (LOPRESSOR) tablet 12.5 mg  12.5 mg Oral Q12H    hydrALAZINE (APRESOLINE) 20 mg/mL injection 10-20 mg  10-20 mg IntraVENous Q6H PRN    colchicine (MITIGARE) capsule 0.6 mg  0.6 mg Oral BID         Physical Exam:     Visit Vitals  BP (!) 120/56   Pulse 68   Temp 99.5 °F (37.5 °C)   Resp 20   Ht 6' (1.829 m)   Wt 104.9 kg (231 lb 4.8 oz)   SpO2 93%   BMI 31.37 kg/m²       TELE: normal sinus rhythm    BP Readings from Last 3 Encounters:   10/10/21 (!) 120/56   09/17/21 123/65   09/02/21 110/70     Pulse Readings from Last 3 Encounters:   10/10/21 68   09/17/21 (!) 53   09/02/21 77     Wt Readings from Last 3 Encounters:   10/10/21 104.9 kg (231 lb 4.8 oz)   09/17/21 103.9 kg (229 lb)   09/02/21 103.9 kg (229 lb)       General:  alert, cooperative, no distress, appears stated age  Neck:  no JVD  Lungs:  clear to auscultation bilaterally  Heart:  regular rate and rhythm, S1, S2 normal  Abdomen:  abdomen is soft without significant tenderness,   Extremities:  extremities normal, atraumatic, no cyanosis or edema  Neuro: alert, oriented x3     Data Review:     Recent Labs     10/09/21  0545   WBC 13.4*   HGB 14.1   HCT 41.6        Recent Labs     10/10/21  0843 10/09/21  0545   NA  --  143   K 3.8 3.6   CL  --  110   CO2  --  23   GLU  --  112*   BUN  --  20*   CREA  --  1.16   CA  --  8.0*   MG 1.9  --        Results for orders placed or performed during the hospital encounter of 10/08/21   EKG, 12 LEAD, INITIAL   Result Value Ref Range    Ventricular Rate 75 BPM    Atrial Rate 75 BPM    P-R Interval 178 ms    QRS Duration 96 ms    Q-T Interval 374 ms    QTC Calculation (Bezet) 417 ms    Calculated P Axis 60 degrees    Calculated R Axis -12 degrees    Calculated T Axis -3 degrees    Diagnosis       Normal sinus rhythm  Inferior infarct , age undetermined  Abnormal ECG  When compared with ECG of 07-APR-2021 12:03,  Sinus rhythm has replaced Atrial fibrillation  Inferior infarct is now present  Confirmed by Sriram Garcia ([id number]) on 10/9/2021 2:13:02 PM     Results for orders placed or performed in visit on 09/02/21   AMB POC EKG ROUTINE W/ 12 LEADS, INTER & REP    Impression    Atrial fibrillation with controlled ventricular rate.          All Cardiac Markers in the last 24 hours:  No results found for: CPK, CK, CKMMB, CKMB, RCK3, CKMBT, CKNDX, CKND1, ABHILASH, TROPT, TROIQ, ROB, TROPT, TNIPOC, BNP, BNPP    Last Lipid:    Lab Results   Component Value Date/Time    Cholesterol, total 159 11/25/2017 01:25 AM    HDL Cholesterol 40 11/25/2017 01:25 AM    LDL, calculated 103.6 (H) 11/25/2017 01:25 AM    Triglyceride 77 11/25/2017 01:25 AM    CHOL/HDL Ratio 4.0 11/25/2017 01:25 AM       Cardiographics:     EKG Results     Procedure 720 Value Units Date/Time    EKG, 12 LEAD, SUBSEQUENT [[id number]] Collected: 10/10/21 0809    Order Status: Completed Updated: 10/10/21 0813     Ventricular Rate 72 BPM      Atrial Rate 72 BPM      P-R Interval 174 ms      QRS Duration 98 ms      Q-T Interval 414 ms      QTC Calculation (Bezet) 453 ms      Calculated P Axis 58 degrees      Calculated R Axis -8 degrees      Calculated T Axis -2 degrees      Diagnosis --     Normal sinus rhythm  Inferior infarct (cited on or before 09-OCT-2021)  Abnormal ECG  When compared with ECG of 09-OCT-2021 20:34,  ST elevation now present in Inferior leads      EKG, 12 LEAD, SUBSEQUENT [[id number]]     Order Status: Canceled     EKG, 12 LEAD, SUBSEQUENT [[id number]] Collected: 10/09/21 2034    Order Status: Completed Updated: 10/10/21 0641     Ventricular Rate 89 BPM      Atrial Rate 89 BPM      P-R Interval 168 ms      QRS Duration 90 ms      Q-T Interval 352 ms      QTC Calculation (Bezet) 428 ms      Calculated P Axis 59 degrees      Calculated R Axis -9 degrees      Calculated T Axis -16 degrees      Diagnosis --     Normal sinus rhythm  Inferior infarct (cited on or before 09-OCT-2021)  Abnormal ECG  When compared with ECG of 09-OCT-2021 13:01,  No significant change was found      EKG, 12 LEAD, SUBSEQUENT [[id number]]     Order Status: Sent     EKG, 12 LEAD, SUBSEQUENT [[id number]]     Order Status: Canceled     EKG, 12 LEAD, SUBSEQUENT [[id number]]     Order Status: Sent     EKG, 12 LEAD, INITIAL [[id number]] Collected: 10/09/21 1301    Order Status: Completed Updated: 10/09/21 1413     Ventricular Rate 75 BPM      Atrial Rate 75 BPM      P-R Interval 178 ms      QRS Duration 96 ms      Q-T Interval 374 ms      QTC Calculation (Bezet) 417 ms      Calculated P Axis 60 degrees      Calculated R Axis -12 degrees      Calculated T Axis -3 degrees      Diagnosis --     Normal sinus rhythm  Inferior infarct , age undetermined  Abnormal ECG  When compared with ECG of 07-APR-2021 12:03,  Sinus rhythm has replaced Atrial fibrillation  Inferior infarct is now present  Confirmed by Domo Olguin ([id number]) on 10/9/2021 2:13:02 PM      EKG, 12 LEAD, SUBSEQUENT [[id number]]     Order Status: Canceled     EKG, 12 LEAD, SUBSEQUENT [[id number]]     Order Status: Canceled     EKG, 12 LEAD, INITIAL [[id number]]     Order Status: Canceled         07/13/21    ECHO KEEGAN W OR WO CONTRAST 07/13/2021 7/13/2021    Interpretation Summary  · LV: Estimated LVEF is 55 - 60%. Visually measured ejection fraction. Normal cavity size and systolic function (ejection fraction normal). Wall motion: normal.  · IAS: Agitated saline contrast study was performed. There was no shunting at baseline or with Valsalva. · No left atrial appendage thrombus. Signed by: Cristhian Silverio MD on 7/13/2021 10:26 AM      02/06/15    NM CARDIAC SPECT W STRS/REST MULT 02/09/2015 2/9/2015    Narrative  A 54-year-old for evaluation of cardiomyopathy. The patient underwent graded exercise nuclear stress test.  Walked a total of 10 minutes and 16 seconds of Gerard protocol which is 12 METS. Resting heart rate is 75. Peak heart rate is 141 which is 98% of predicted. He was injected with 9.2 mCi of technetium at rest and 30.9 mCi of technetium at peak infusion. TOMOGRAPHIC IMAGES:  Rotating planar images revealed good technical quality. There is uniform radiopharmaceutical uptake on stress images. There is some enhancement of the inferior wall due to bowel uptake, however, there is no significant ischemia. Gated images revealed a mild generalized or global hypokinesis with an ejection fraction of 45% with increased left ventricular volumes. FINAL    Impression  1. Normal myocardial perfusion. 2.  Mild depressed left ventricular systolic function.     Signed by: Teresa Shafer MD on 2/9/2015 11:08 AM        XR Results (most recent):  Results from [place] encounter on 10/08/21    XR CHEST PORT    Narrative  Portable CXR:    HISTORY: Post thoracic surgery. Atrial fibrillation. COMPARISON: October 1, 2021    Left chest tube tip in the apical region. No pneumothorax. Discoid atelectasis  in the left lateral lung base. Mild atelectasis right lung base. No  consolidation. No vascular congestion. No focal edema. Impression  No pneumothorax. Bibasilar atelectasis.         Signed By: Vikram Pettit MD     October 10, 2021

## 2021-10-10 NOTE — PROGRESS NOTES
Reason for Admission:  Atrial fibrillation, unspecified type ([facility] [address] [I48.91]  CAD (coronary artery disease) [I25.10]                 RUR Score:    12%            Plan for utilizing home health:    Yes, FOC verbal consent given for [facility] [address]. Likelihood of Readmission:   LOW                         Transition of Care Plan:              Initial assessment completed with spouse/SO, Samra Rondon. Cognitive status of patient: oriented to time, place, person and situation. Face sheet information confirmed:  yes. The patient's wife Samra Agent [phone] agrees to participate in his discharge plan and to receive any needed information. This patient lives in a single family home with his wife, with 2 steps to enter. Patient is able to navigate steps as needed. Prior to hospitalization, patient was considered to be independent with ADLs/IADLS : yes . Patient has a current ACP document on file: no.      Healthcare Decision Maker:   Primary Decision Maker: Malu Flores - Spouse - [phone]    Click here to complete [employer] including selection of the Healthcare Decision Maker Relationship (ie \"Primary\")    The patient's wife will be available to transport patient home upon discharge. The patient already has none reported,  medical equipment available in the home. Patient is not currently active with home health. Patient has not stayed in a skilled nursing facility or rehab. This patient is on dialysis :no.      List of available Home Health agencies were provided and reviewed with the patient prior to discharge. Kelly of choice verbal consent given: yes, for [facility] [address] Currently, the discharge plan is Home with [address] [place] [address] Abdulaziz Warren. The patient states that he can obtain his medications from the pharmacy, and take his medications as directed.     Patient's current insurance is [employer]. Care Management Interventions  PCP Verified by CM:  Yes  Mode of Transport at Discharge: Self (Patient's wife will be transporting patient home at time of discharge. )  Transition of Care Consult (CM Consult): [address]: Yes  Discharge Durable Medical Equipment: No  Physical Therapy Consult: No  Occupational Therapy Consult: No  Speech Therapy Consult: No  Support Systems: Spouse/Significant Other  Confirm Follow Up Transport: Family  The Plan for Transition of Care is Related to the Following Treatment Goals : Home with [address] [place], [place] [address]  The Patient and/or Patient Representative was Provided with a Choice of Provider and Agrees with the Discharge Plan?: Yes  Name of the Patient Representative Who was Provided with a Choice of Provider and Agrees with the Discharge Plan: Georgia Abraham (Patient's wife)  Freedom of Choice List was Provided with Basic Dialogue that Supports the Patient's Individualized Plan of Care/Goals, Treatment Preferences and Shares the Quality Data Associated with the Providers?: Yes  Discharge Location  Discharge Placement: Home with home health        Von Lemons RN  Case Management [phone]

## 2021-10-10 NOTE — PROGRESS NOTES
CM called patient's wife Gamal Mcmullen [phone] to try to complete an initial assessment for patient, received voicemail, CM left name and phone number for a return call.            Sherren Rily, RN  Case Management [phone]

## 2021-10-10 NOTE — PROGRESS NOTES
0700- Report received from Eunice Keita [place]. Will assume care of the patient. 7188- 12 lead EKG preformed. QTc 453    0843- Phlebotomy at bedside. Pt resting comfortably in recliner. No patient needs at this time. Call bell within reach. 56- FRANC Bonilla at bedside. ... Chest tube to remain in place today due to continued output. Will resume xarelto tonight. Pt ok for transfer to CVT stepdown. 1400- Pt ambulated with RN for approximately 5 minutes. Pt tolerated ambulation well but reports worsening pain with inspiration and coughing upon returning to the recliner. Chest tube in place and continues to drain. No air leak. Will medicate for pain. 1600- Pt resting comfortably in his recliner. No patient needs at this time. Call bell within reach. 1800- Pt taken back to bed after dinner. Pt medicated for pain. 1900- Report given to Eunice Keita RN.

## 2021-10-10 NOTE — PROGRESS NOTES
1900-Bedside turnover from Jerry COTTO, RN.     2000-Pt requested pain medication, see MAR and pain flow sheet. 2038-EKG performed. Qtc 428. MD Johnson called to check on pt and was updated. 2137-Pt had 8 beat run of Vtach, he was using the urinal at this time. BP remained stable. Pt was asymptomatic, will cont to monitor. 2148-Pt medicated for pain with 2 tabs of Percocet, see pain flow sheet. Pt in NSR.     2230-Pt reports relief of pain, now 3/10. 02 sats good. NSR. MAP stable. No air leak in CT, no sub q air. Will cont to monitor. 0000-Pt resting, VSS, NSR. No further needs. 0200-Pt asleep, VSS. No apparent pain. 0330-No air leak in CT, no sub q air. NSR on the monitor, MAP stable. Pt declines pain medication at this time. Call bell within reach. Urinal emptied. 0520-Pt medicated for pain, VSS, NSR. QTc on them monitor is 438.     0600-Pt given CHG bath, assisted to chair. No air leak in CT. Standing scale weight obtained. 0700-Bedside and verbal report given to Jerry COTTO, RN.

## 2021-10-11 ENCOUNTER — HOME HEALTH ADMISSION (OUTPATIENT)
Dept: HOME HEALTH SERVICES | Facility: HOME HEALTH | Age: 71
End: 2021-10-11
Payer: MEDICARE

## 2021-10-11 ENCOUNTER — APPOINTMENT (OUTPATIENT)
Dept: GENERAL RADIOLOGY | Age: 71
DRG: 229 | End: 2021-10-11
Attending: PHYSICIAN ASSISTANT
Payer: MEDICARE

## 2021-10-11 VITALS
TEMPERATURE: 98.5 F | WEIGHT: 230.3 LBS | SYSTOLIC BLOOD PRESSURE: 102 MMHG | BODY MASS INDEX: 31.19 KG/M2 | RESPIRATION RATE: 14 BRPM | DIASTOLIC BLOOD PRESSURE: 66 MMHG | HEART RATE: 64 BPM | HEIGHT: 72 IN | OXYGEN SATURATION: 95 %

## 2021-10-11 PROBLEM — Z98.890 S/P ABLATION OF ATRIAL FIBRILLATION: Status: ACTIVE | Noted: 2021-10-08

## 2021-10-11 PROBLEM — Z86.79 S/P ABLATION OF ATRIAL FIBRILLATION: Status: ACTIVE | Noted: 2021-10-08

## 2021-10-11 LAB
ATRIAL RATE: 72 BPM
ATRIAL RATE: 73 BPM
ATRIAL RATE: 74 BPM
ATRIAL RATE: 89 BPM
CALCULATED P AXIS, ECG09: 58 DEGREES
CALCULATED P AXIS, ECG09: 59 DEGREES
CALCULATED P AXIS, ECG09: 61 DEGREES
CALCULATED P AXIS, ECG09: 63 DEGREES
CALCULATED R AXIS, ECG10: -8 DEGREES
CALCULATED R AXIS, ECG10: -8 DEGREES
CALCULATED R AXIS, ECG10: -9 DEGREES
CALCULATED R AXIS, ECG10: 2 DEGREES
CALCULATED T AXIS, ECG11: -16 DEGREES
CALCULATED T AXIS, ECG11: -2 DEGREES
CALCULATED T AXIS, ECG11: -21 DEGREES
CALCULATED T AXIS, ECG11: -30 DEGREES
DIAGNOSIS, 93000: NORMAL
MAGNESIUM SERPL-MCNC: 2.2 MG/DL (ref 1.6–2.6)
P-R INTERVAL, ECG05: 168 MS
P-R INTERVAL, ECG05: 174 MS
P-R INTERVAL, ECG05: 174 MS
P-R INTERVAL, ECG05: 176 MS
POTASSIUM SERPL-SCNC: 3.9 MMOL/L (ref 3.5–5.5)
Q-T INTERVAL, ECG07: 352 MS
Q-T INTERVAL, ECG07: 392 MS
Q-T INTERVAL, ECG07: 406 MS
Q-T INTERVAL, ECG07: 414 MS
QRS DURATION, ECG06: 84 MS
QRS DURATION, ECG06: 88 MS
QRS DURATION, ECG06: 90 MS
QRS DURATION, ECG06: 98 MS
QTC CALCULATION (BEZET), ECG08: 428 MS
QTC CALCULATION (BEZET), ECG08: 435 MS
QTC CALCULATION (BEZET), ECG08: 447 MS
QTC CALCULATION (BEZET), ECG08: 453 MS
VENTRICULAR RATE, ECG03: 72 BPM
VENTRICULAR RATE, ECG03: 73 BPM
VENTRICULAR RATE, ECG03: 74 BPM
VENTRICULAR RATE, ECG03: 89 BPM

## 2021-10-11 PROCEDURE — 99024 POSTOP FOLLOW-UP VISIT: CPT | Performed by: PHYSICIAN ASSISTANT

## 2021-10-11 PROCEDURE — 93005 ELECTROCARDIOGRAM TRACING: CPT

## 2021-10-11 PROCEDURE — 83735 ASSAY OF MAGNESIUM: CPT

## 2021-10-11 PROCEDURE — APPNB30 APP NON BILLABLE TIME 0-30 MINS: Performed by: PHYSICIAN ASSISTANT

## 2021-10-11 PROCEDURE — 84132 ASSAY OF SERUM POTASSIUM: CPT

## 2021-10-11 PROCEDURE — 36415 COLL VENOUS BLD VENIPUNCTURE: CPT

## 2021-10-11 PROCEDURE — APPNB45 APP NON BILLABLE 31-45 MINUTES: Performed by: PHYSICIAN ASSISTANT

## 2021-10-11 PROCEDURE — 71045 X-RAY EXAM CHEST 1 VIEW: CPT

## 2021-10-11 PROCEDURE — 74011250637 HC RX REV CODE- 250/637: Performed by: PHYSICIAN ASSISTANT

## 2021-10-11 PROCEDURE — 99232 SBSQ HOSP IP/OBS MODERATE 35: CPT | Performed by: INTERNAL MEDICINE

## 2021-10-11 PROCEDURE — 2709999900 HC NON-CHARGEABLE SUPPLY

## 2021-10-11 RX ORDER — DOCUSATE SODIUM 100 MG/1
100 CAPSULE, LIQUID FILLED ORAL 2 TIMES DAILY
Status: DISCONTINUED | OUTPATIENT
Start: 2021-10-11 | End: 2021-10-11 | Stop reason: HOSPADM

## 2021-10-11 RX ORDER — COLCHICINE 0.6 MG/1
0.6 CAPSULE ORAL 2 TIMES DAILY
Qty: 20 CAPSULE | Refills: 0 | Status: SHIPPED | OUTPATIENT
Start: 2021-10-11 | End: 2021-10-26

## 2021-10-11 RX ORDER — POLYETHYLENE GLYCOL 3350 17 G/17G
17 POWDER, FOR SOLUTION ORAL DAILY
Status: DISCONTINUED | OUTPATIENT
Start: 2021-10-11 | End: 2021-10-11 | Stop reason: HOSPADM

## 2021-10-11 RX ORDER — DOCUSATE SODIUM 100 MG/1
100 CAPSULE, LIQUID FILLED ORAL
Qty: 20 CAPSULE | Refills: 0 | Status: SHIPPED | OUTPATIENT
Start: 2021-10-11 | End: 2021-10-26

## 2021-10-11 RX ORDER — POTASSIUM CHLORIDE 20 MEQ/1
20 TABLET, EXTENDED RELEASE ORAL
Status: COMPLETED | OUTPATIENT
Start: 2021-10-11 | End: 2021-10-11

## 2021-10-11 RX ORDER — OXYCODONE AND ACETAMINOPHEN 5; 325 MG/1; MG/1
1 TABLET ORAL
Qty: 20 TABLET | Refills: 0 | Status: SHIPPED | OUTPATIENT
Start: 2021-10-11 | End: 2021-10-16

## 2021-10-11 RX ORDER — DOFETILIDE 0.5 MG/1
500 CAPSULE ORAL EVERY 12 HOURS
Qty: 60 CAPSULE | Refills: 2 | Status: SHIPPED | OUTPATIENT
Start: 2021-10-11 | End: 2022-01-13 | Stop reason: SDUPTHER

## 2021-10-11 RX ADMIN — DOCUSATE SODIUM 100 MG: 100 CAPSULE, LIQUID FILLED ORAL at 11:23

## 2021-10-11 RX ADMIN — POLYETHYLENE GLYCOL 3350 17 G: 17 POWDER, FOR SOLUTION ORAL at 11:23

## 2021-10-11 RX ADMIN — BRIMONIDINE TARTRATE 1 DROP: 2 SOLUTION OPHTHALMIC at 08:19

## 2021-10-11 RX ADMIN — COLCHICINE 0.6 MG: 0.6 CAPSULE ORAL at 08:19

## 2021-10-11 RX ADMIN — METOPROLOL TARTRATE 12.5 MG: 25 TABLET, FILM COATED ORAL at 08:19

## 2021-10-11 RX ADMIN — BISACODYL 10 MG: 5 TABLET, COATED ORAL at 08:18

## 2021-10-11 RX ADMIN — Medication 81 MG: at 08:19

## 2021-10-11 RX ADMIN — OXYCODONE HYDROCHLORIDE AND ACETAMINOPHEN 2 TABLET: 5; 325 TABLET ORAL at 05:06

## 2021-10-11 RX ADMIN — POTASSIUM CHLORIDE 20 MEQ: 1500 TABLET, EXTENDED RELEASE ORAL at 11:23

## 2021-10-11 RX ADMIN — DOFETILIDE 500 MCG: 0.25 CAPSULE ORAL at 06:55

## 2021-10-11 NOTE — PROGRESS NOTES
Per pt's nurse Tawnya Carrillo, pt left. Home health orders sent to [facility] - INPAT[facility]. Abdirashid was notified.             KAMALA PatrickN RN  Care Management  Pager: [phone]

## 2021-10-11 NOTE — PROGRESS NOTES
CARDIOTHORACIC SURGERY PROGRESS NOTE    10/11/2021  9:44 AM     Post Operative Day # 3     Chart reviewed. Interval History/Events of Past 24 hours:   Tikosyn load continues at starting dose. Chest tube drainage tapered. CXR with better aeration and no signif effusions. No ptx. Tolerates diet, +flautus, no BM    Subjective:  Patient seen and examined on rounds today. Pain level: controlled  Ambulating:  well   Sleeping:  well  Eating:   well    Objective:  Vital signs:   Visit Vitals  /64   Pulse 70   Temp 98.3 °F (36.8 °C)   Resp 16   Ht 6' (1.829 m)   Wt 104.5 kg (230 lb 4.8 oz)   SpO2 91%   BMI 31.23 kg/m²     Temp (24hrs), Av.5 °F (36.9 °C), Min:98.2 °F (36.8 °C), Max:99.4 °F (37.4 °C)    Admission Weight: Last Weight   Weight: 102.1 kg (225 lb) Weight: 104.5 kg (230 lb 4.8 oz)     Physical Examination:     General:  Alert, oriented   Lungs: Dull right base but overall clear to ascultation without rales, wheezes or rhonchi. Inspiratory effort Good. Chest:  Dressings clean and dry. Incisions healing well. No SC air. Heart:  RRR without rub or murmur. Abdomen: Active sounds. Soft and non-tender without masses. Extremities:  Edema not present. Warm and well perfused. Neuro: No deficit. Assessment:  S/P  Convergent ablation, Left VATS for Atriclip  Respiratory parameters stable  Cardiac status stable     Plans:  1. Chest tube removed without difficulty. Bandage may be removed in 2 days  2. Tikosyn Load per cardiology  3  Cathartics for BM  4. OK for discharge home when OK with cardiology. [address] orders written. Brandi Zheng PA-C    PLEASE NOTE:  This document has been produced using voice recognition software. Unrecognized errors in transcription may be present.

## 2021-10-11 NOTE — DISCHARGE INSTRUCTIONS
Atrial Fibrillation: Care Instructions  Your Care Instructions     Atrial fibrillation is an irregular and often fast heartbeat. Treating this condition is important for several reasons. It can cause blood clots, which can travel from your heart to your brain and cause a stroke. If you have a fast heartbeat, you may feel lightheaded, dizzy, and weak. An irregular heartbeat can also increase your risk for heart failure. Atrial fibrillation is often the result of another heart condition, such as high blood pressure or coronary artery disease. Making changes to improve your heart condition will help you stay healthy and active. Follow-up care is a key part of your treatment and safety. Be sure to make and go to all appointments, and call your doctor if you are having problems. It's also a good idea to know your test results and keep a list of the medicines you take. How can you care for yourself at home? Medicines    · Take your medicines exactly as prescribed. Call your doctor if you think you are having a problem with your medicine. You will get more details on the specific medicines your doctor prescribes.     · If your doctor has given you a blood thinner to prevent a stroke, be sure you get instructions about how to take your medicine safely. Blood thinners can cause serious bleeding problems.     · Do not take any vitamins, over-the-counter drugs, or herbal products without talking to your doctor first.   Lifestyle changes    · Do not smoke. Smoking can increase your chance of a stroke and heart attack. If you need help quitting, talk to your doctor about stop-smoking programs and medicines. These can increase your chances of quitting for good.     · Eat a heart-healthy diet.     · Stay at a healthy weight. Lose weight if you need to.     · Limit alcohol to 2 drinks a day for men and 1 drink a day for women. Too much alcohol can cause health problems.     · Avoid colds and flu.  Get a pneumococcal vaccine shot. If you have had one before, ask your doctor whether you need another dose. Get a flu shot every year. If you must be around people with colds or flu, wash your hands often. Activity    · If your doctor recommends it, get more exercise. Walking is a good choice. Bit by bit, increase the amount you walk every day. Try for at least 30 minutes on most days of the week. You also may want to swim, bike, or do other activities. Your doctor may suggest that you join a cardiac rehabilitation program so that you can have help increasing your physical activity safely.     · Start light exercise if your doctor says it is okay. Even a small amount will help you get stronger, have more energy, and manage stress. Walking is an easy way to get exercise. Start out by walking a little more than you did in the hospital. Gradually increase the amount you walk.     · When you exercise, watch for signs that your heart is working too hard. You are pushing too hard if you cannot talk while you are exercising. If you become short of breath or dizzy or have chest pain, sit down and rest immediately.     · Check your pulse regularly. Place two fingers on the artery at the palm side of your wrist, in line with your thumb. If your heartbeat seems uneven or fast, talk to your doctor. When should you call for help? Call 911 anytime you think you may need emergency care. For example, call if:    · You have symptoms of a heart attack. These may include:  ? Chest pain or pressure, or a strange feeling in the chest.  ? Sweating. ? Shortness of breath. ? Nausea or vomiting. ? Pain, pressure, or a strange feeling in the back, neck, jaw, or upper belly or in one or both shoulders or arms. ? Lightheadedness or sudden weakness. ? A fast or irregular heartbeat. After you call 911, the  may tell you to chew 1 adult-strength or 2 to 4 low-dose aspirin. Wait for an ambulance.  Do not try to drive yourself.     · You have symptoms of a stroke. These may include:  ? Sudden numbness, tingling, weakness, or loss of movement in your face, arm, or leg, especially on only one side of your body. ? Sudden vision changes. ? Sudden trouble speaking. ? Sudden confusion or trouble understanding simple statements. ? Sudden problems with walking or balance. ? A sudden, severe headache that is different from past headaches.     · You passed out (lost consciousness). Call your doctor now or seek immediate medical care if:    · You have new or increased shortness of breath.     · You feel dizzy or lightheaded, or you feel like you may faint.     · Your heart rate becomes irregular.     · You can feel your heart flutter in your chest or skip heartbeats. Tell your doctor if these symptoms are new or worse. Watch closely for changes in your health, and be sure to contact your doctor if you have any problems. Where can you learn more? Go to http://www.gray.com/  Enter U020 in the search box to learn more about \"Atrial Fibrillation: Care Instructions. \"  Current as of: April 29, 2021               Content Version: 13.0  © 2052-7153 [employer]. Care instructions adapted under license by [employer] (which disclaims liability or warranty for this information). If you have questions about a medical condition or this instruction, always ask your healthcare professional. [place] [address] any warranty or liability for your use of this information. Patient Education        Left Atrial Appendage Closure: What to Expect at Home  Your Recovery     Your left atrial appendage (JORDAN) closure was done to close off this area in your heart. The procedure can help prevent a clot from moving out of the JORDAN to the heart or brain. This can prevent a stroke. Your doctor used a catheter to place a small device that plugs up the JORDAN.   After the procedure, you will spend at least 1 night in the hospital. Your groin may have a bruise and feel sore for a couple of days. This is where the catheter was inserted into your blood vessel. You can do light activities around the house. But don't do anything strenuous for a day or two. Within the coming year, you will likely have a couple of tests to check that the device has closed off the JORDAN. This care sheet gives you a general idea about how long it will take for you to recover. But each person recovers at a different pace. Follow the steps below to get better as quickly as possible. How can you care for yourself at home? Activity    · Do not do strenuous exercise and do not lift, pull, or push anything heavy until your doctor says it is okay. This may be for a day or two. Most people can return to regular activities in a few days.     · Try not to walk up stairs for the first couple of days. This will help the catheter site to heal.     · Rest when you feel tired. Diet    · Eat heart-healthy foods. These foods include vegetables, fruits, nuts, beans, lean meat, fish, and whole grains. Limit sodium, alcohol, and sugar.     · If your bowel movements are not regular right after the procedure, try to avoid constipation and straining. Drink plenty of water. Your doctor may suggest fiber, a stool softener, or a mild laxative. Medicines    · Your doctor will tell you if and when you can restart your medicines. You will also get instructions about taking any new medicines.     · Your doctor will likely prescribe blood-thinning medicine. Be sure to get instructions about how to take your medicine safely. Blood thinners can cause serious bleeding problems.     · Be safe with medicines. Take your medicines exactly as prescribed. Call your doctor if you think you are having a problem with your medicine. Care of the catheter site    · For 1 or 2 days, keep a bandage over the spot where the catheter was inserted.  The bandage probably will fall off in this time.     · Put ice or a cold pack on the area for 10 to 20 minutes at a time to help with soreness or swelling. Put a thin cloth between the ice and your skin.     · You may shower 24 to 48 hours after the procedure, if your doctor okays it. Pat the incision dry.     · Do not soak the catheter site until it is healed. Don't take a bath for 1 week, or until your doctor tells you it is okay.     · Watch for bleeding from the site. A small amount of blood (up to the size of a quarter) on the bandage can be normal.     · If you are bleeding, lie down and press on the area for 15 minutes to try to make it stop. If the bleeding does not stop, call your doctor or seek immediate medical care. Other instructions    · Carry your device identification card with you at all times.     · For 6 months after having the JORDAN closure, be sure to tell all of your doctors and your dentist that you have the device in your heart. This is important because you may need to take antibiotics before certain procedures to prevent infection. Follow-up care is a key part of your treatment and safety. Be sure to make and go to all appointments, and call your doctor if you are having problems. It's also a good idea to know your test results and keep a list of the medicines you take. When should you call for help? Call 911 anytime you think you may need emergency care. For example, call if:    · You passed out (lost consciousness).     · You have symptoms of a stroke. These may include:  ? Sudden numbness, tingling, weakness, or loss of movement in your face, arm, or leg, especially on only one side of your body. ? Sudden vision changes. ? Sudden trouble speaking. ? Sudden confusion or trouble understanding simple statements. ? Sudden problems with walking or balance. ? A sudden, severe headache that is different from past headaches.    Call your doctor now or seek immediate medical care if:    · You are bleeding from the area where the catheter was put in your blood vessel.     · You have a fast-growing, painful lump at the catheter site.     · You have signs of infection, such as:  ? Increased pain, swelling, warmth, or redness. ? Red streaks leading from the catheter site. ? Pus draining from the catheter site. ? A fever.     · Your leg is painful, looks blue, or feels cold, numb, or tingly.     · You have new or worse trouble breathing. Watch closely for changes in your health, and be sure to contact your doctor if you have any problems. Current as of: April 29, 2021               Content Version: 13.0  © 2006-2021 [employer]. Care instructions adapted under license by [employer] (which disclaims liability or warranty for this information). If you have questions about a medical condition or this instruction, always ask your healthcare professional. [place] [address] any warranty or liability for your use of this information. Patient Education        Learning About Catheter Ablation for Heart Rhythm Problems  What is catheter ablation? Catheter ablation is a procedure that treats heart rhythm problems. These problems include atrial fibrillation, supraventricular tachycardia (SVT), atrial flutter, and ventricular tachycardia. Your heart should have a strong, steady beat. That beat is controlled by the heart's electrical system. Sometimes that system misfires. This causes a heartbeat that is too fast and isn't steady. Catheter ablation is a way to get into your heart and fix the problem. Ablation is not surgery. How is catheter ablation done? Your doctor inserts thin tubes called catheters into a blood vessel in your groin, arm, or neck. Then your doctor feeds them into the heart. Wires in the catheters help the doctor find the problem areas. Then the doctor uses the wires to send energy to destroy the tiny areas of heart tissue that are causing the problems.   It may seem like a bad idea to destroy parts of your heart on purpose. But the areas that are destroyed are very tiny. They should not affect your heart's ability to do its job. You may be awake during the procedure. Or you may be asleep. The doctor will give you medicines to help you feel relaxed and to numb the areas where the catheters go in. You may feel a little uncomfortable, but you should not feel pain. What can you expect after catheter ablation? You may stay overnight in the hospital. How long you stay in the hospital depends on the type of ablation you have. Do not exercise hard or lift anything heavy for a week. You will probably be able to go back to work and to your normal routine in 1 or 2 days. You may have swelling, bruising, or a small lump around the site where the catheters went into your body. These should go away in 3 to 4 weeks. You may have to take some medicines for a while. Follow-up care is a key part of your treatment and safety. Be sure to make and go to all appointments, and call your doctor if you are having problems. It's also a good idea to know your test results and keep a list of the medicines you take. Where can you learn more? Go to http://www.gray.com/  Enter N533 in the search box to learn more about \"Learning About Catheter Ablation for Heart Rhythm Problems. \"  Current as of: April 29, 2021               Content Version: 13.0  © 7469-6813 [employer]. Care instructions adapted under license by [employer] (which disclaims liability or warranty for this information). If you have questions about a medical condition or this instruction, always ask your healthcare professional. [place] [address] any warranty or liability for your use of this information.

## 2021-10-11 NOTE — PROGRESS NOTES
Cardiology Progress Note    Admit Date: 10/8/2021  Attending Cardiologist: Dr. Samuel Kwonger:     Hospital Problems  Date Reviewed: 10/8/2021        Codes Class Noted POA    CAD (coronary artery disease) ICD-10-CM: I25.10  ICD-9-CM: 414.00  12/3/2015 Unknown            1. Persistent atrial fibrillation status post external cardioversion in 2019, PVI via cryoballoon in 2021  2. Admitted for Convergent hybrid epicardial ablation using a subxiphoid approach with ablation of posterior left atrium up to atrioventricular groove 10/8/21 Dr Graciella Angelucci   3. Left VATS with clip occlusion of left atrial appendage (size 35). Patient tolerated procedure without any complication  4. Started on Tikosyn post procedure for maintenance of rhythm- remains in NSR   5. Hx of sick sinus syndrome limiting AV blocking agents with subcutaneous loop recorder placed at the time of his ablation July 13, 2021.  No significant episodes of bradycardia or pauses.         Echo 4/8/21    · LV: Estimated LVEF is 55 - 60%. Normal wall thickness and systolic function (ejection fraction normal). Mildly dilated left ventricle. E/E' ratio = 10.21. Unable to assess diastolic function. E/E' ratio = 10.21. Atrial fibrillation observed. · LA: Mildly dilated left atrium. · Tricuspid regurgitation is inadequate for estimation of right ventricular systolic pressure. · Mild aortic valve regurgitation. Plan:      S/p  epicardial ablation without any problem. Left atrial appendage occluded with clip 10/8/21  Patient is hemodynamically stale.  denying any angina or heart failure symptoms   Currently on Tikosyn for maintenance of sinus rhythm according to protocol explained by EP Dr. Mirella Mosquera  on ekg today 10/11/21 406 ms   Currently on aspirin, dofetilide, metoprolol, Crestor  Xarelto started by CT surgery team.     [facility] to discharge from cardiac stand point follow in office 102 weeks with Dr. David Garcia      Staff addendum:  Patient seen while up walking halls, doing well, rhythm stable. QTc 447 msec this morning. Patient would like to go home. I discussed with shannon Samayoa to discharge. I saw, examined, and evaluated the patient. I personally reviewed the patient's labs, tests, vitals, orders, medications, updated history, and other providers assessments. I personally agree with the findings as stated and the plan as documented. Luis Miguel Luther MD      Subjective:     Feels great want to go home.  surgical incision w/o s/s infection     Objective:      Patient Vitals for the past 8 hrs:   Temp Pulse Resp BP SpO2   10/11/21 0915  70 16 127/64 91 %   10/11/21 0900  68 16 115/62 93 %   10/11/21 0800 98.3 °F (36.8 °C) 73 16 130/65 91 %   10/11/21 0655  84  131/72    10/11/21 0500  73 19 135/66 93 %   10/11/21 0400 98.2 °F (36.8 °C) 68 16 119/66 94 %   10/11/21 0300  71 21 (!) 144/78 97 %   10/11/21 0200  64 17 127/62 95 %         Patient Vitals for the past 96 hrs:   Weight   10/11/21 0706 104.5 kg (230 lb 4.8 oz)   10/10/21 0702 104.9 kg (231 lb 4.8 oz)   10/09/21 0706 104.7 kg (230 lb 14.4 oz)   10/08/21 0726 102.1 kg (225 lb)       TELE: normal sinus rhythm               Current Facility-Administered Medications   Medication Dose Route Frequency Last Admin    potassium chloride (K-DUR, KLOR-CON) SR tablet 20 mEq  20 mEq Oral NOW      rivaroxaban (XARELTO) tablet 20 mg  20 mg Oral DAILY WITH DINNER 20 mg at 10/10/21 1804    rosuvastatin (CRESTOR) tablet 20 mg  20 mg Oral QHS 20 mg at 10/10/21 2205    dofetilide (TIKOSYN) capsule 500 mcg  500 mcg Oral Q12H 500 mcg at 10/11/21 0655    Dofetilide formerly [facility]): Need EKG 2-3 hours after dose given  1 Each Other Q12H 1 Each at 10/11/21 0800    ELECTROLYTE REPLACEMENT PROTOCOL - Magnesium  1 Each Other PRN      ELECTROLYTE REPLACEMENT PROTOCOL - Potassium   1 Each Other PRN      sodium chloride (NS) flush 5-40 mL  5-40 mL IntraVENous Q8H 10 mL at 10/10/21 1411    sodium chloride (NS) flush 5-40 mL 5-40 mL IntraVENous PRN      oxyCODONE-acetaminophen (PERCOCET) 5-325 mg per tablet 1-2 Tablet  1-2 Tablet Oral Q4H PRN 2 Tablet at 10/11/21 0506    naloxone (NARCAN) injection 0.4 mg  0.4 mg IntraVENous PRN      bisacodyL (DULCOLAX) tablet 10 mg  10 mg Oral DAILY 10 mg at 10/11/21 0818    morphine injection 2 mg  2 mg IntraVENous Q2H PRN 2 mg at 10/10/21 1406    brimonidine (ALPHAGAN) 0.2 % ophthalmic solution 1 Drop  1 Drop Left Eye BID 1 Drop at 10/11/21 0819    aspirin delayed-release tablet 81 mg  81 mg Oral DAILY 81 mg at 10/11/21 0819    metoprolol tartrate (LOPRESSOR) tablet 12.5 mg  12.5 mg Oral Q12H 12.5 mg at 10/11/21 0819    hydrALAZINE (APRESOLINE) 20 mg/mL injection 10-20 mg  10-20 mg IntraVENous Q6H PRN      colchicine (MITIGARE) capsule 0.6 mg  0.6 mg Oral BID 0.6 mg at 10/11/21 0819         Intake/Output Summary (Last 24 hours) at 10/11/2021 0949  Last data filed at 10/11/2021 0800  Gross per 24 hour   Intake 770 ml   Output 2530 ml   Net -1760 ml       Physical Exam:  General:  alert, cooperative, no distress, appears stated age, mildly obese  Neck:  no masses, no stridor, no carotid bruit, no JVD  Lungs:  clear to auscultation bilaterally  Heart:  regular rate and rhythm, S1, S2 normal, no murmur, click, rub or gallop  Abdomen:  abdomen is soft without significant tenderness, masses, organomegaly or guarding  Extremities:  extremities normal, atraumatic, no cyanosis or edema    Visit Vitals  /64   Pulse 70   Temp 98.3 °F (36.8 °C)   Resp 16   Ht 6' (1.829 m)   Wt 104.5 kg (230 lb 4.8 oz)   SpO2 91%   BMI 31.23 kg/m²       Data Review:     Labs: Results:       Chemistry Recent Labs     10/11/21  0636 10/10/21  0843 10/09/21  0545   GLU  --   --  112*   NA  --   --  143   K 3.9 3.8 3.6   CL  --   --  110   CO2  --   --  23   BUN  --   --  20*   CREA  --   --  1.16   CA  --   --  8.0*   MG 2.2 1.9  --    AGAP  --   --  10   BUCR  --   --  17      CBC w/Diff Recent Labs 10/09/21  0545   WBC 13.4*   RBC 4.60   HGB 14.1   HCT 41.6         Cardiac Enzymes No results found for: CPK, CK, CKMMB, CKMB, RCK3, CKMBT, CKNDX, CKND1, ABHILASH, TROPT, TROIQ, ROB, TROPT, TNIPOC, BNP, BNPP   Coagulation No results for input(s): PTP, INR, APTT, INREXT in the last 72 hours. Lipid Panel Lab Results   Component Value Date/Time    Cholesterol, total 159 11/25/2017 01:25 AM    HDL Cholesterol 40 11/25/2017 01:25 AM    LDL, calculated 103.6 (H) 11/25/2017 01:25 AM    VLDL, calculated 15.4 11/25/2017 01:25 AM    Triglyceride 77 11/25/2017 01:25 AM    CHOL/HDL Ratio 4.0 11/25/2017 01:25 AM      BNP No results found for: BNP, BNPP, XBNPT   Liver Enzymes No results for input(s): TP, ALB, TBIL, AP in the last 72 hours.     No lab exists for component: SGOT, GPT, DBIL   Thyroid Studies Lab Results   Component Value Date/Time    TSH 2.74 04/07/2021 12:05 PM          Signed By: ERIC Andujar    October 11, 2021

## 2021-10-11 NOTE — PROGRESS NOTES
Problem: Lung Procedures/VATS Pathway: Post-Op Day 2  Goal: Off Pathway (Use only if patient is Off Pathway)  Outcome: Resolved/Met  Goal: Activity/Safety  Outcome: Resolved/Met  Goal: Diagnostic Test/Procedures  Outcome: Resolved/Met  Goal: Nutrition/Diet  Outcome: Resolved/Met  Goal: Discharge Planning  Outcome: Resolved/Met  Goal: Medications  Outcome: Resolved/Met  Goal: Respiratory  Outcome: Resolved/Met  Goal: Treatments/Interventions/Procedures  Outcome: Resolved/Met  Goal: Psychosocial  Outcome: Resolved/Met  Goal: *No signs and symptoms of infection or wound complications  Outcome: Resolved/Met  Goal: *Optimal pain control at patient's stated goal  Outcome: Resolved/Met  Goal: *Adequate urinary output (equal to or greater than 30 milliliters/hour)  Outcome: Resolved/Met  Goal: *Hemodynamically stable  Outcome: Resolved/Met  Goal: *Tolerating diet  Outcome: Resolved/Met  Goal: *Demonstrates progressive activity  Outcome: Resolved/Met  Goal: *Lungs clear or at baseline  Outcome: Resolved/Met  Goal: *Oxygen saturation within defined limits  Outcome: Resolved/Met

## 2021-10-11 NOTE — HOME CARE
Received  referral for SN,PT,OT ( Alex protocol, TH) ; Discharge order noted for today , spoke to patient, Verified demographics,explained [facility] services and answered all questions; patient states his wife will assist him at home after discharge ; patient states he does not own or use any DME;  [facility] referral processed to [facility] central intake. MAGALY DAVILA.

## 2021-10-11 NOTE — PROGRESS NOTES
1900-Bedside turnover from Jerry COTTO RN. Will resume care. 2000-VSS. Assessment completed. Dressings clean/dry, CT in place to wall suction 20cm, no air leak. Serous fluid. No crepitus. NSR on the monitor. EKG performed and transmitted, , pharmacist notified. Pt reports no pain at this time. 2100-Pt resting in bed, VSS, NSR. Call bell within reach. 2204-Medicated for pain at chest tube and incision site, NSR, MAP good, see pain assessment flow sheet. 0000-Pt resting in bed with eyes closed, easily aroused. NSR on the monitor. VSS.     0200-Pt asleep. CT to suction, no air leak, no crep.     0400-VSS. Pt asleep. NSR.     0500-Pt medicated for pain, see MAR and pain assessment flow sheet. CT site clean/dry, no crep. Incision dressings intact. Call bell within reach. 0530-xray at bedside. 0600-CHG bath given. Tikosyn medication given. NSR on the monitor. qtc on the monitor is 440.     0630-Pt ambulated in the hallway for 6 min, estimated 75 ft. No SOB noted, 02 saturations remained above 90%, respirations in upper 20's, remained in NSR, rate in the 80's/90's.     0700-Bedside and verbal report given to HEYDI Woods RN.

## 2021-10-11 NOTE — DISCHARGE SUMMARY
CARDIAC SURGERY DISCHARGE SUMMARY    10/11/2021    CHIEF COMPLAINT: atrial fibrillation    HISTORY OF PRESENT ILLNESS:  Bong Powell is a 70 y.o. male patient of Dr. Kevin Potter who presented with atrial fibrillation. He was admitted for surgical ablation. PAST MEDICAL HISTORY:   Past Medical History:   Diagnosis Date    Cancer ([address]     basal cell    Cholestanol storage disease     GERD (gastroesophageal reflux disease)     well controlled with meds    Hypercholesteremia     Hypertension 62563    5yrs    Kidney stones     Nausea & vomiting     Pneumonia    . PAST SURGICAL HISTORY:   Past Surgical History:   Procedure Laterality Date    CORONARY STENT EA ADDL VESSEL  12/4/2015    CORONARY STENT SINGLE W/PTCA  12/4/2015    HX ADENOIDECTOMY      HX APPENDECTOMY      HX CERVICAL FUSION  05/02/2012    HX HEENT  2013    sinus surgery    HX OTHER SURGICAL      basal cell removal from forehead    HX OTHER SURGICAL  2012    varicose vein surgery    HX TONSILLECTOMY      HX UROLOGICAL  4-13    lithotripsy    LEFT HEART PERCUTANEOUS  12/4/2015    [place] CARDIAC SURG PROCEDURE UNLIST      [place] CARDIOVERSION ELECTIVE ARRHYTHMIA EXTERNAL N/A 9/23/2019    EP CARDIOVERSION/KEEGAN prior performed by Sandy Arboleda MD at [facility] CATH LAB    RT & LT HEART WITH C.O.  12/3/2017    BRYAN CORONARY ARTERIOGRAPH  12/4/2015    BRYAN CORONARY ARTERIOGRAPH  12/3/2017   . HOSPITAL COURSE:  He was admitted to the hospital and underwent Procedure(s):  EPICARDIAL ABLATION (CONVERGENT)/VIDEO ASSISTED THORACOSCOPIC LEFT ATRIAL APPENDAGE CLIP LIGATION  TRANSESOPHAGEAL ECHOCARDIOGRAM on 10/8/21 by Dr. Teresa Valladares. He  was eventually up and ambulating well and taking a diet well. Chest tubes were removed. Notable postoperative events included tikosyn load with close EKG follow up.       Procedures:   Procedure(s):  EPICARDIAL ABLATION (CONVERGENT)/VIDEO ASSISTED THORACOSCOPIC LEFT ATRIAL APPENDAGE CLIP LIGATION  TRANSESOPHAGEAL ECHOCARDIOGRAM    He is to be discharged to  today. At the time of discharge, his lungs were clear to auscultation bilaterally and the heart had a regular rate and rhythm. The  wounds were well approximated with no evidence of infection. There was no pedal edema. Room air oximetry was 95%. LABS:  Lab Results   Component Value Date/Time    WBC 13.4 (H) 10/09/2021 05:45 AM    HCT 41.6 10/09/2021 05:45 AM     10/09/2021 05:45 AM      Lab Results   Component Value Date/Time     10/09/2021 05:45 AM    K 3.9 10/11/2021 06:36 AM     10/09/2021 05:45 AM    CO2 23 10/09/2021 05:45 AM     (H) 10/09/2021 05:45 AM    BUN 20 (H) 10/09/2021 05:45 AM    CREA 1.16 10/09/2021 05:45 AM    CREA 1.94 (H) 10/01/2021 01:01 PM    CREA 1.34 (H) 07/06/2021 09:49 AM       DISCHARGE DIAGNOSES:    1. Atrial fibrillation s/p convergent ablation and JORDAN clip via LEFT VATS  2. hypertension  3. overweight    DISCHARGE DISPOSITION:  1. Activities: normal,  no heavy lifting  2. Diet: Cardiac Diet  3. Condition: good  4. Prognosis:  good    DISCHARGE INSTRUCTIONS:  He is to follow up in the CT surgery clinic in 10-14 days. and will see the primary care physician and cardiologist preferably within one month. The visiting nurses will see him once home. Follow-up appointments: Follow-up Information     Follow up With Specialties Details Why Contact Info    Merline Petite, [address] [facility]  [phone]            DISCHARGE MEDICATIONS:    Current Discharge Medication List      START taking these medications    Details   dofetilide (TIKOSYN) 500 mcg capsule Take 1 Capsule by mouth every twelve (12) hours every twelve (12) hours. Qty: 60 Capsule, Refills: 2  Start date: 10/11/2021      oxyCODONE-acetaminophen (PERCOCET) 5-325 mg per tablet Take 1 Tablet by mouth every six (6) hours as needed for Pain for up to 5 days. Max Daily Amount: 4 Tablets.   Qty: 20 Tablet, Refills: 0  Start date: 10/11/2021, End date: 10/16/2021    Associated Diagnoses: S/P ablation of atrial fibrillation      docusate sodium (COLACE) 100 mg capsule Take 1 Capsule by mouth two (2) times daily as needed for Constipation for up to 10 days. Qty: 20 Capsule, Refills: 0  Start date: 10/11/2021, End date: 10/21/2021      colchicine (MITIGARE) 0.6 mg capsule Take 1 Capsule by mouth two (2) times a day for 10 days. Qty: 20 Capsule, Refills: 0  Start date: 10/11/2021, End date: 10/21/2021         CONTINUE these medications which have NOT CHANGED    Details   cholecalciferol, vitamin D3, (Vitamin D3) 50 mcg (2,000 unit) tab Take  by mouth. aspirin delayed-release 81 mg tablet Take  by mouth daily. irbesartan (AVAPRO) 150 mg tablet       dilTIAZem ER (CARDIZEM CD) 180 mg capsule TAKE 1 CAPSULE BY ORAL ROUTE EVERY DAY      Alphagan P 0.1 % ophthalmic solution       metoprolol tartrate (LOPRESSOR) 25 mg tablet Take 0.5 Tabs by mouth every twelve (12) hours. Qty: 60 Tab, Refills: 1      rivaroxaban (XARELTO) 20 mg tab tablet Take 1 Tab by mouth daily (with dinner). Qty: 30 Tab, Refills: 0      omeprazole (PRILOSEC) 40 mg capsule Take 40 mg by mouth daily. Cetirizine (ZYRTEC) 10 mg cap Take 10 mg by mouth. rosuvastatin (CRESTOR) 20 mg tablet Take 20 mg by mouth nightly. spironolactone (ALDACTONE) 25 mg tablet Take  by mouth daily.          STOP taking these medications       chlorhexidine (Hibiclens) 4 % liquid Comments:   Reason for Stopping:         mupirocin (BACTROBAN) 2 % ointment Comments:   Reason for Stopping:               Kimberley Parry PA-C  [facility]  [phone]

## 2021-10-12 ENCOUNTER — HOME CARE VISIT (OUTPATIENT)
Dept: HOME HEALTH SERVICES | Facility: HOME HEALTH | Age: 71
End: 2021-10-12

## 2021-10-12 ENCOUNTER — HOME CARE VISIT (OUTPATIENT)
Dept: SCHEDULING | Facility: HOME HEALTH | Age: 71
End: 2021-10-12
Payer: MEDICARE

## 2021-10-12 VITALS
DIASTOLIC BLOOD PRESSURE: 82 MMHG | HEART RATE: 68 BPM | RESPIRATION RATE: 16 BRPM | OXYGEN SATURATION: 97 % | SYSTOLIC BLOOD PRESSURE: 128 MMHG | TEMPERATURE: 98.2 F

## 2021-10-12 LAB
ABO + RH BLD: NORMAL
BLD PROD TYP BPU: NORMAL
BLOOD GROUP ANTIBODIES SERPL: NORMAL
BPU ID: NORMAL
CROSSMATCH RESULT,%XM: NORMAL
SPECIMEN EXP DATE BLD: NORMAL
STATUS OF UNIT,%ST: NORMAL
UNIT DIVISION, %UDIV: 0

## 2021-10-12 PROCEDURE — 400013 HH SOC

## 2021-10-12 PROCEDURE — G0299 HHS/HOSPICE OF RN EA 15 MIN: HCPCS

## 2021-10-12 NOTE — HOME HEALTH
Skilled services/Home bound verification:     Skilled Reason for admission/summary of clinical condition:  Patient is post [address] (CONVERGENT)/VIDEO ASSISTED THORACOSCOPIC LEFT ATRIAL APPENDAGE CLIP LIGATION TRANSESOPHAGEAL ECHOCARDIOGRAM. HE has HX of afiba nd HTN Patient will require nursing for education and medication teaching   This patient is homebound for the following reasons Requires considerable and taxing effort to leave the home . Caregiver: spouse. Caregiver assists with ADLS meals and any other needs . Medications reconciled and all medications are available in the home this visit. The following education was provided regarding medications, medication interactions, and look alike medications (specify): xarelto and its sideeffects . Medications  are effective at this time. High risk medication teaching regarding anticoagulants, hyperglycemic agents or opiod narcotics performed (specify) xaretlto and its bleeding effects , oxycodone- acetaminophen     Dr Brien Ruffin notified of any discrepancies/medication interactions . Home health supplies by type and quantity ordered/delivered this visit include: none    Patient education provided this visit to include: patient educated on xarelto and its bleeding, s/s of infection when to call SN when to call MD .  Patient/caregiver degree of understanding:good    Home exercise program/Homework provided: breathing techniques     Pt/Caregiver instructed on plan of care and are agreeable to plan of care at this time. Physician Dr Brien Ruffin notified of patient admission to home health and plan of care including anticipated frequency of Sn and treatments/interventions/modalities of education. Discharge planning discussed with patient and caregiver. Discharge planning as follows: when goals met and education is complete . Pt/Caregiver did verbalize understanding of discharge planning.      Next MD appointment 10/21/2021 (date) with  Alex MA/NP/PA. Patient/caregiver encouraged/instructed to keep appointment as lack of follow through with physician appointment could result in discontinuation of home care services for non-compliance.

## 2021-10-13 ENCOUNTER — HOME CARE VISIT (OUTPATIENT)
Dept: SCHEDULING | Facility: HOME HEALTH | Age: 71
End: 2021-10-13
Payer: MEDICARE

## 2021-10-13 VITALS
OXYGEN SATURATION: 94 % | DIASTOLIC BLOOD PRESSURE: 60 MMHG | HEART RATE: 60 BPM | RESPIRATION RATE: 16 BRPM | TEMPERATURE: 98.3 F | SYSTOLIC BLOOD PRESSURE: 130 MMHG

## 2021-10-13 PROCEDURE — G0299 HHS/HOSPICE OF RN EA 15 MIN: HCPCS

## 2021-10-13 NOTE — HOME HEALTH
Skilled reason for visit: incision check and assessement     Caregiver involvement: wife cares for all needs and is available 24/7. Medications reviewed and all medications are available in the home this visit. The following education was provided regarding medications, medication interactions, and look alike medications (specify): none. Medications  are effective at this time.       Home health supplies by type and quantity ordered/delivered this visit include: none    Patient education provided this visit: deep breathing exercises, making sure that he is walking around and moving, s/s of infection, when to call SN, bracing chest when he coughs as it is causing him pain     Patient's Progress towards personal goals: patient stated that he was feeling better just coughing     Home exercise program: breathing techniques     Continued need for the following skills: Nursing    Patient and/or caregiver notified and agrees to changes in the Plan of Care N/A      The following discharge planning was discussed with the pt/caregiver: when goals met and education is complete

## 2021-10-14 ENCOUNTER — HOME CARE VISIT (OUTPATIENT)
Dept: SCHEDULING | Facility: HOME HEALTH | Age: 71
End: 2021-10-14
Payer: MEDICARE

## 2021-10-14 PROCEDURE — G0299 HHS/HOSPICE OF RN EA 15 MIN: HCPCS

## 2021-10-15 ENCOUNTER — HOME CARE VISIT (OUTPATIENT)
Dept: HOME HEALTH SERVICES | Facility: HOME HEALTH | Age: 71
End: 2021-10-15
Payer: MEDICARE

## 2021-10-19 VITALS
TEMPERATURE: 97.8 F | OXYGEN SATURATION: 97 % | HEART RATE: 60 BPM | DIASTOLIC BLOOD PRESSURE: 72 MMHG | RESPIRATION RATE: 16 BRPM | SYSTOLIC BLOOD PRESSURE: 128 MMHG

## 2021-10-21 ENCOUNTER — OFFICE VISIT (OUTPATIENT)
Dept: CARDIOTHORACIC SURGERY | Age: 71
End: 2021-10-21
Payer: MEDICARE

## 2021-10-21 VITALS
WEIGHT: 216 LBS | BODY MASS INDEX: 29.26 KG/M2 | OXYGEN SATURATION: 98 % | HEART RATE: 52 BPM | RESPIRATION RATE: 18 BRPM | DIASTOLIC BLOOD PRESSURE: 50 MMHG | TEMPERATURE: 97.7 F | SYSTOLIC BLOOD PRESSURE: 90 MMHG | HEIGHT: 72 IN

## 2021-10-21 DIAGNOSIS — Z86.79 S/P ABLATION OF ATRIAL FIBRILLATION: Primary | ICD-10-CM

## 2021-10-21 DIAGNOSIS — Z98.890 S/P ABLATION OF ATRIAL FIBRILLATION: Primary | ICD-10-CM

## 2021-10-21 PROCEDURE — 99024 POSTOP FOLLOW-UP VISIT: CPT | Performed by: PHYSICIAN ASSISTANT

## 2021-10-21 NOTE — PROGRESS NOTES
Chief Complaint   Patient presents with    Post OP Follow Up     Epicardial Ablation     1. Have you been to the ER, urgent care clinic since your last visit? Hospitalized since your last visit? No    2. Have you seen or consulted any other health care providers outside of the [address] [place] [address] since your last visit? Include any pap smears or colon screening. No      Patient stated that his back hurts at night and his energy has decreased.

## 2021-10-22 NOTE — PROGRESS NOTES
Alina Huerta presents today for evaluation of complaints of very low blood pressures. He states that during his most recent appointment with Dr. Anu Platt office, blood pressures obtained with a digital blood pressure monitor showed blood pressures of 80/50 and 85/54. He states that it was not rechecked with a manual blood pressure cuff. He was instructed to follow-up with cardiology. He states that he has not had any lightheadedness or dizziness or other cardiac complaints. He has noticed not bouncing back as quickly after his epicardial ablation when compared to his PVI with cryoballoon in July 2021. He is s/p convergent hybrid epicardial ablation and left VATS with clip occlusion of the left atrial appendage (size 35). He was started on Tikosyn post-procedure for maintenance of rhythm. His loop recorder was interrogated today and it showed 2 pauses that occurred during the procedure and an episode of tachycardia that occurred before the procedure. He is a 70year old male with history of atrial fibrillation (s/p cardioversion in 2019), bradycardia. He underwent atrial fibrillation ablation and pulmonary vein isolation with cryoballoon July 13, 2021. A subcutaneous loop was implanted at that time to monitor for bradycardia and recurrent atrial fibrillation. He underwent a KEEGAN on 7/13/21 and it showed an EF of 55-60%, normal wall motion, and no shunting and no left atrial appendage thrombus. Denies chest pain, tightness, heaviness, and palpitations. Denies shortness of breath at rest, dyspnea on exertion, orthopnea and PND. Denies abdominal bloating. Denies lightheadedness, dizziness, and syncope. Denies lower extremity edema and claudication. Denies nausea, vomiting, diarrhea, melena, hematochezia. Denies hematuria, urgency, frequency. Denies fever, chills.         PMH:  Past Medical History:   Diagnosis Date    Cancer ([address]     basal cell    Cholestanol storage disease     GERD (gastroesophageal reflux disease)     well controlled with meds    Hypercholesteremia     Hypertension 60725    5yrs    Kidney stones     Nausea & vomiting     Pneumonia     S/P ablation of atrial fibrillation 10/8/2021       PSH:  Past Surgical History:   Procedure Laterality Date    CORONARY STENT EA ADDL VESSEL  12/4/2015    CORONARY STENT SINGLE W/PTCA  12/4/2015    HX ADENOIDECTOMY      HX APPENDECTOMY      HX CERVICAL FUSION  05/02/2012    HX HEENT  2013    sinus surgery    HX OTHER SURGICAL      basal cell removal from forehead    HX OTHER SURGICAL  2012    varicose vein surgery    HX TONSILLECTOMY      HX UROLOGICAL  4-13    lithotripsy    LEFT HEART PERCUTANEOUS  12/4/2015    [place] CARDIAC SURG PROCEDURE UNLIST      [place] CARDIOVERSION ELECTIVE ARRHYTHMIA EXTERNAL N/A 9/23/2019    EP CARDIOVERSION/KEEGAN prior performed by Cristopher Wilhelm [facility] at [facility] CATH LAB    RT & LT HEART WITH C.O.  12/3/2017    BRYAN CORONARY ARTERIOGRAPH  12/4/2015    BRYAN CORONARY ARTERIOGRAPH  12/3/2017       MEDS:  Current Outpatient Medications   Medication Sig    Alphagan P 0.1 % ophthalmic solution Administer 1 Drop to left eye three (3) times daily.  Cetirizine (ZyrTEC) 10 mg cap Take 10 mg by mouth daily.  oxyCODONE-acetaminophen (PERCOCET) 5-325 mg per tablet Take 1 Tablet by mouth every six (6) hours as needed for Pain.  dofetilide (TIKOSYN) 500 mcg capsule Take 1 Capsule by mouth every twelve (12) hours every twelve (12) hours.  cholecalciferol, vitamin D3, (Vitamin D3) 50 mcg (2,000 unit) tab Take 50 mcg by mouth daily.  aspirin delayed-release 81 mg tablet Take 81 mg by mouth daily.  irbesartan (AVAPRO) 150 mg tablet Take 150 mg by mouth daily.  spironolactone (ALDACTONE) 25 mg tablet Take 25 mg by mouth daily.     dilTIAZem ER (CARDIZEM CD) 180 mg capsule TAKE 1 CAPSULE BY ORAL ROUTE EVERY DAY    metoprolol tartrate (LOPRESSOR) 25 mg tablet Take 0.5 Tabs by mouth every twelve (12) hours.    rivaroxaban (XARELTO) 20 mg tab tablet Take 1 Tab by mouth daily (with dinner).  omeprazole (PRILOSEC) 40 mg capsule Take 40 mg by mouth daily.  rosuvastatin (CRESTOR) 20 mg tablet Take 20 mg by mouth nightly. No current facility-administered medications for this visit. Allergies and Sensitivities:  Allergies   Allergen Reactions    Penicillins Rash and Itching       Family History:  Family History   Problem Relation Age of Onset    Malignant Hyperthermia Neg Hx     Pseudocholinesterase Deficiency Neg Hx     Delayed Awakening Neg Hx     Post-op Nausea/Vomiting Neg Hx     Emergence Delirium Neg Hx        Social History:  He  reports that he has never smoked. He has never used smokeless tobacco.  He  reports no history of alcohol use. Physical:  Visit Vitals  BP (!) 120/56 (BP 1 Location: Right upper arm)   Pulse (!) 55   Ht 6' (1.829 m)   Wt 97.5 kg (215 lb)   SpO2 98%   BMI 29.16 kg/m²       Exam:  Neck:  Supple, no JVD, no carotid bruits  CV:  Normal S1 and  S2, no murmurs, rubs, or gallops noted  Lungs:  Clear to ausculation throughout, no wheezes or rales  Abd:  Soft, non-tender, non-distended with good bowel sounds. No hepatosplenomegaly  Extremities:  No edema      Data:  EKG:  Not done today      LABS:  Lab Results   Component Value Date/Time    Sodium 143 10/09/2021 05:45 AM    Potassium 3.9 10/11/2021 06:36 AM    Chloride 110 10/09/2021 05:45 AM    CO2 23 10/09/2021 05:45 AM    Glucose 112 (H) 10/09/2021 05:45 AM    BUN 20 (H) 10/09/2021 05:45 AM    Creatinine 1.16 10/09/2021 05:45 AM     Lab Results   Component Value Date/Time    Cholesterol, total 159 11/25/2017 01:25 AM    HDL Cholesterol 40 11/25/2017 01:25 AM    LDL, calculated 103.6 (H) 11/25/2017 01:25 AM    Triglyceride 77 11/25/2017 01:25 AM    CHOL/HDL Ratio 4.0 11/25/2017 01:25 AM     Lab Results   Component Value Date/Time    ALT (SGPT) 24 10/01/2021 01:01 PM         Impression/Plan:  1.   Hypertension, blood pressure stable,   2. Atrial fibrillation, s/p cardioversion in 2019 and s/p PVI with cryoballoon in July 2021, s/p epicardial ablation and clip occlusion of left atrial appendage on 10/8/21  3. Bradycardia  4. Hypercholesterolemia, on rosuvastatin 20mg  5. GERD    Mr. Joanna Reis was seen today for evaluation of complaints of very low blood pressures as noted during a recent follow-up appointment at Dr. Andrew Roman office. Digital blood pressure monitor readings showed blood pressures of 80/50 and 85/54. He states that it was not rechecked with a manual blood pressure cuff. He was instructed to follow-up with cardiology. His blood pressures today were 114/52 (left arm) and 120/56 (right arm) using a manual cuff. He states that his blood pressure reading at home was similar to the 114/52 reading. He states that his readings at home have been similar to this. He denies lightheadedness and dizziness. His only complaint today is of not having as much energy. He states that he has not \"bounced\" back as quickly from this procedure compared to when he had his PVI using cryoballoon in July 2021. The only new medication that he is taking is the Tikosyn. He was asked to continue his present medication regimen for now. I will discuss his case with Dr. Mayda Abreu and will call Mr. Joanna Reis if any changes need to be made. Per Dr. Mayda Abreu, continue present medication regimen and follow-up as scheduled. **Addendum:  I called Mr. Joanna Reis on his cell phone but he did not answer. I left a message instructing him to continue taking his medications as prescribed, follow-up with Dr. Mayda Abreu as scheduled and call if he has any problems prior to his appointment. **    His loop recorder was interrogated today and it showed 2 pauses that occurred on the day he had the procedure done (10/8/21- during the procedure).   He has not had any atrial fibrillation post epicardial ablation and VATS  with clip occlusion of the left atrial appendage (size 35). He will follow-up with Dr. Liya Londono as scheduled and as needed. Faye Venegas MSN, FNP-BC    Please note:  Portions of this chart were created with Dragon medical speech to text program.  Unrecognized errors may be present.

## 2021-10-25 NOTE — PROGRESS NOTES
CARDIAC SURGERY FOLLOW-UP NOTE    Subjective:   Freddy Mcgrath returns for a follow-up visit following Convergent Epicardial ablation and LEFT VATS JORDAN clip on 10/8/21 by Dr. Ronnie Chandler:  Overall he is doing well. Wounds are healing. Plans / Recommendations: Follow-up with Primary Care Provider and Cardiologist in the future as directed. Has no regularly scheduled appointment to see us, but may call with any questions or concerns. Maxwell Spears PA-C  [facility]  [phone]  _______________________________________________________________________________________________________________________________________________________  Subjective:  He feels fatigued all the time. His wounds are healing. No drainage, erythema or edema. Current medications include:  Current Outpatient Medications   Medication Sig Dispense Refill    dofetilide (TIKOSYN) 500 mcg capsule Take 1 Capsule by mouth every twelve (12) hours every twelve (12) hours. 60 Capsule 2    cholecalciferol, vitamin D3, (Vitamin D3) 50 mcg (2,000 unit) tab Take 50 mcg by mouth daily.  aspirin delayed-release 81 mg tablet Take 81 mg by mouth daily.  irbesartan (AVAPRO) 150 mg tablet Take 150 mg by mouth daily.  spironolactone (ALDACTONE) 25 mg tablet Take 25 mg by mouth daily.  dilTIAZem ER (CARDIZEM CD) 180 mg capsule TAKE 1 CAPSULE BY ORAL ROUTE EVERY DAY      Alphagan P 0.1 % ophthalmic solution Administer 1 Drop to both eyes three (3) times daily.  metoprolol tartrate (LOPRESSOR) 25 mg tablet Take 0.5 Tabs by mouth every twelve (12) hours. 60 Tab 1    rivaroxaban (XARELTO) 20 mg tab tablet Take 1 Tab by mouth daily (with dinner). 30 Tab 0    omeprazole (PRILOSEC) 40 mg capsule Take 40 mg by mouth daily.  Cetirizine (ZYRTEC) 10 mg cap Take 10 mg by mouth.  rosuvastatin (CRESTOR) 20 mg tablet Take 20 mg by mouth nightly.       oxyCODONE-acetaminophen (PERCOCET) 5-325 mg per tablet Take 1 Tablet by mouth every six (6) hours as needed for Pain. Objective:   Vital signs:    BP Readings from Last 3 Encounters:   10/21/21 (!) 90/50   10/14/21 128/72   10/13/21 130/60     Wt Readings from Last 3 Encounters:   10/21/21 98 kg (216 lb)   10/11/21 104.5 kg (230 lb 4.8 oz)   09/17/21 103.9 kg (229 lb)     @TMAX(24)@  Wt Readings from Last 3 Encounters:   10/21/21 98 kg (216 lb)   10/11/21 104.5 kg (230 lb 4.8 oz)   09/17/21 103.9 kg (229 lb)     Ht Readings from Last 3 Encounters:   10/21/21 6' (1.829 m)   10/08/21 6' (1.829 m)   09/17/21 6' (1.829 m)     Respiratory exam: clear to auscultation bilaterally. Cardiac exam: regular rate and rhythm   wound: clean, dry, healing.

## 2021-10-26 ENCOUNTER — OFFICE VISIT (OUTPATIENT)
Dept: CARDIOLOGY CLINIC | Age: 71
End: 2021-10-26
Payer: MEDICARE

## 2021-10-26 VITALS
BODY MASS INDEX: 29.12 KG/M2 | DIASTOLIC BLOOD PRESSURE: 56 MMHG | SYSTOLIC BLOOD PRESSURE: 120 MMHG | HEIGHT: 72 IN | OXYGEN SATURATION: 98 % | HEART RATE: 55 BPM | WEIGHT: 215 LBS

## 2021-10-26 DIAGNOSIS — I50.32 DIASTOLIC CHF, CHRONIC (HCC): Primary | ICD-10-CM

## 2021-10-26 DIAGNOSIS — I49.5 SICK SINUS SYNDROME (HCC): ICD-10-CM

## 2021-10-26 DIAGNOSIS — Z95.818 STATUS POST PLACEMENT OF IMPLANTABLE LOOP RECORDER: ICD-10-CM

## 2021-10-26 DIAGNOSIS — I42.9 CARDIOMYOPATHY, UNSPECIFIED TYPE (HCC): ICD-10-CM

## 2021-10-26 PROCEDURE — 99213 OFFICE O/P EST LOW 20 MIN: CPT | Performed by: NURSE PRACTITIONER

## 2021-10-26 RX ORDER — BRIMONIDINE TARTRATE 1 MG/ML
1 SOLUTION/ DROPS OPHTHALMIC 3 TIMES DAILY
Qty: 1 ML | Refills: 0
Start: 2021-10-26 | End: 2022-06-22

## 2021-10-26 RX ORDER — CETIRIZINE HYDROCHLORIDE 10 MG/1
10 CAPSULE, LIQUID FILLED ORAL DAILY
Qty: 1 CAPSULE | Refills: 0
Start: 2021-10-26

## 2021-10-26 NOTE — PATIENT INSTRUCTIONS
Continue present medication regimen for now  Will call you if any changes need to be made once I speak with Dr. Huy Price  Follow-up with Dr. Huy Price as scheduled and as needed

## 2021-11-03 NOTE — PROGRESS NOTES
Undersensed events noted. I have personally seen and evaluated the device findings. Interrogation reviewed and I agree with assessment.     Kevin Cole

## 2021-11-08 ENCOUNTER — TELEPHONE (OUTPATIENT)
Dept: CARDIOLOGY CLINIC | Age: 71
End: 2021-11-08

## 2021-11-08 ENCOUNTER — HOSPITAL ENCOUNTER (OUTPATIENT)
Dept: LAB | Age: 71
Discharge: HOME | End: 2021-11-08
Payer: MEDICARE

## 2021-11-08 DIAGNOSIS — I49.5 SICK SINUS SYNDROME (HCC): ICD-10-CM

## 2021-11-08 DIAGNOSIS — I48.91 ATRIAL FIBRILLATION, UNSPECIFIED TYPE (HCC): Primary | ICD-10-CM

## 2021-11-08 DIAGNOSIS — I48.91 ATRIAL FIBRILLATION, UNSPECIFIED TYPE (HCC): ICD-10-CM

## 2021-11-08 LAB
ANION GAP SERPL CALC-SCNC: 4 MMOL/L (ref 3–18)
BASOPHILS # BLD: 0 K/UL (ref 0–0.1)
BASOPHILS NFR BLD: 0 % (ref 0–2)
BUN SERPL-MCNC: 14 MG/DL (ref 7–18)
BUN/CREAT SERPL: 11 (ref 12–20)
CALCIUM SERPL-MCNC: 8.8 MG/DL (ref 8.5–10.1)
CHLORIDE SERPL-SCNC: 107 MMOL/L (ref 100–111)
CO2 SERPL-SCNC: 28 MMOL/L (ref 21–32)
CREAT SERPL-MCNC: 1.31 MG/DL (ref 0.6–1.3)
DIFFERENTIAL METHOD BLD: ABNORMAL
EOSINOPHIL # BLD: 0.4 K/UL (ref 0–0.4)
EOSINOPHIL NFR BLD: 6 % (ref 0–5)
ERYTHROCYTE [DISTWIDTH] IN BLOOD BY AUTOMATED COUNT: 12.2 % (ref 11.6–14.5)
GLUCOSE SERPL-MCNC: 84 MG/DL (ref 74–99)
HCT VFR BLD AUTO: 44 % (ref 36–48)
HGB BLD-MCNC: 14.2 G/DL (ref 13–16)
LYMPHOCYTES # BLD: 1.1 K/UL (ref 0.9–3.6)
LYMPHOCYTES NFR BLD: 16 % (ref 21–52)
MAGNESIUM SERPL-MCNC: 2.1 MG/DL (ref 1.6–2.6)
MCH RBC QN AUTO: 29.2 PG (ref 24–34)
MCHC RBC AUTO-ENTMCNC: 32.3 G/DL (ref 31–37)
MCV RBC AUTO: 90.5 FL (ref 78–100)
MONOCYTES # BLD: 0.9 K/UL (ref 0.05–1.2)
MONOCYTES NFR BLD: 13 % (ref 3–10)
NEUTS SEG # BLD: 4.5 K/UL (ref 1.8–8)
NEUTS SEG NFR BLD: 65 % (ref 40–73)
PLATELET # BLD AUTO: 243 K/UL (ref 135–420)
PMV BLD AUTO: 11.4 FL (ref 9.2–11.8)
POTASSIUM SERPL-SCNC: 4.1 MMOL/L (ref 3.5–5.5)
RBC # BLD AUTO: 4.86 M/UL (ref 4.35–5.65)
SODIUM SERPL-SCNC: 139 MMOL/L (ref 136–145)
WBC # BLD AUTO: 7 K/UL (ref 4.6–13.2)

## 2021-11-08 PROCEDURE — 85025 COMPLETE CBC W/AUTO DIFF WBC: CPT

## 2021-11-08 PROCEDURE — 83735 ASSAY OF MAGNESIUM: CPT

## 2021-11-08 PROCEDURE — 80048 BASIC METABOLIC PNL TOTAL CA: CPT

## 2021-11-08 PROCEDURE — 36415 COLL VENOUS BLD VENIPUNCTURE: CPT

## 2021-11-08 NOTE — TELEPHONE ENCOUNTER
Spoke to pt on phone because he was sting that he is \"just not feeling good and I think it is because of this medication that I was started on. \" Pt went on to explain that he has been feeling nauseated, poor appetite, some mild headaches and having diarrhea since being on Tykosyn. Said his BP has been \"good, around 110/60's\". In reviewing his chart, noted that he had been in to see Harvey Coleman NP back on October 26 but these complaints are new since then. At the time she followed up with Dr Giana Villanueva who recommended staying same med course, but to check back as needed. Told pt would update Harvey Coleman NP, and would call him back to update and/or if she felt he needed to come in before his Nov 18 appt with Dr Giana Villanueva. Discussed pt phone call with Harvey Coleman NP. Called pt back to request a loop device check/ transmission.

## 2021-11-10 NOTE — TELEPHONE ENCOUNTER
Loop transmission was received, forwarded to Eileen Nelson NP, no actions needed, and a follow up appt was scheduled with Dr Robyn Simpson for Nov 11

## 2021-11-11 ENCOUNTER — OFFICE VISIT (OUTPATIENT)
Dept: CARDIOLOGY CLINIC | Age: 71
End: 2021-11-11
Payer: MEDICARE

## 2021-11-11 VITALS
WEIGHT: 209 LBS | HEIGHT: 72 IN | HEART RATE: 60 BPM | SYSTOLIC BLOOD PRESSURE: 110 MMHG | OXYGEN SATURATION: 98 % | DIASTOLIC BLOOD PRESSURE: 74 MMHG | BODY MASS INDEX: 28.31 KG/M2

## 2021-11-11 DIAGNOSIS — Z79.899 VISIT FOR MONITORING TIKOSYN THERAPY: ICD-10-CM

## 2021-11-11 DIAGNOSIS — Z95.818 STATUS POST PLACEMENT OF IMPLANTABLE LOOP RECORDER: ICD-10-CM

## 2021-11-11 DIAGNOSIS — Z79.01 ON RIVAROXABAN THERAPY: ICD-10-CM

## 2021-11-11 DIAGNOSIS — R11.0 NAUSEA: ICD-10-CM

## 2021-11-11 DIAGNOSIS — I49.5 SICK SINUS SYNDROME (HCC): ICD-10-CM

## 2021-11-11 DIAGNOSIS — I42.9 CARDIOMYOPATHY, UNSPECIFIED TYPE (HCC): ICD-10-CM

## 2021-11-11 DIAGNOSIS — I48.91 ATRIAL FIBRILLATION, UNSPECIFIED TYPE (HCC): Primary | ICD-10-CM

## 2021-11-11 DIAGNOSIS — Z51.81 VISIT FOR MONITORING TIKOSYN THERAPY: ICD-10-CM

## 2021-11-11 DIAGNOSIS — I50.32 DIASTOLIC CHF, CHRONIC (HCC): ICD-10-CM

## 2021-11-11 PROCEDURE — G8432 DEP SCR NOT DOC, RNG: HCPCS | Performed by: INTERNAL MEDICINE

## 2021-11-11 PROCEDURE — G8536 NO DOC ELDER MAL SCRN: HCPCS | Performed by: INTERNAL MEDICINE

## 2021-11-11 PROCEDURE — G8427 DOCREV CUR MEDS BY ELIG CLIN: HCPCS | Performed by: INTERNAL MEDICINE

## 2021-11-11 PROCEDURE — G8417 CALC BMI ABV UP PARAM F/U: HCPCS | Performed by: INTERNAL MEDICINE

## 2021-11-11 PROCEDURE — G8752 SYS BP LESS 140: HCPCS | Performed by: INTERNAL MEDICINE

## 2021-11-11 PROCEDURE — 93000 ELECTROCARDIOGRAM COMPLETE: CPT | Performed by: INTERNAL MEDICINE

## 2021-11-11 PROCEDURE — 99215 OFFICE O/P EST HI 40 MIN: CPT | Performed by: INTERNAL MEDICINE

## 2021-11-11 PROCEDURE — G8754 DIAS BP LESS 90: HCPCS | Performed by: INTERNAL MEDICINE

## 2021-11-11 PROCEDURE — 1101F PT FALLS ASSESS-DOCD LE1/YR: CPT | Performed by: INTERNAL MEDICINE

## 2021-11-11 PROCEDURE — 3017F COLORECTAL CA SCREEN DOC REV: CPT | Performed by: INTERNAL MEDICINE

## 2021-11-11 NOTE — PROGRESS NOTES
History of Present Illness:  70year old male here for follow up. He was initially referred by Dr. Suzi Hernandez back to me for atrial fibrillation. He had cardioversion at the end of 2019, maintained sinus rhythm, then noticed decreasing energy. He was actually hospitalized in April for bradycardia and AV blocking agents were decreased. He was placed back on lower dose. He underwent PVI in July and also placed a subcutaneous loop recorder to monitor for bradycardia and recurrent atrial fibrillation. He did well until September and he had recurrence. He underwent epicardial ablation by Dr. Genet Crowley October 8, 2021. I also started him on Tikosyn. Since discharge he has had prolonged recovery. His major concern is that of reduced appetite with nausea. He did notice that up until a couple weeks ago he would regurgitate and feel a foul taste in his mouth. He had been on PPI and has a history of reflux, but last endoscopy a long time ago. He also noticed profound fatigue, has lost about 20 pounds. He can perform some activities, but he is just quite drained. There was initial concern about some hypotension, but actually at home his blood pressure is well controlled, as well as today in the office. No significant chest pain or syncope. He is here to discuss options. Impression:  1. Persistent postoperative nausea after epicardial ablation October 8, 2021, possible exacerbation of his reflux versus medication. 2. Status post Tikosyn loading 10/08/21 with QT corrected interval 408 milliseconds today. 3. History of previous PVI with cryoballoon July, 2021, previous cardioversion 2019.  4. History of sick sinus syndrome, limiting AV blocking agents, with loop recorder placed July, 2021.  5. Chronic Xarelto use without bleeding issues. 6. Echo April, 2021 with normal function, mild left atrial enlargement. Plan:  He has persistent postoperative nausea with some weight loss.   This may be related to the manipulation around his esophagus and stomach at the time of his procedure. Hopefully it is going to improve, but I have asked him to follow up with his gastroenterologist in case other options beyond PPI might be considered and possible endoscopy. In regards to his atrial fibrillation, he is now maintaining sinus rhythm, but he does have an element of chronotropic intolerance. I would like to stop Cardizem at this point and have him monitor his blood pressure with the hope of improvement in at least his chronotropic response. I have talked about the potential long term need for pacemaker. I am going to keep the Tikosyn at this point and he is on Xarelto and I will see back within about a month. Past Medical History:   Diagnosis Date    Cancer ([facility] [address]     basal cell    Cholestanol storage disease     GERD (gastroesophageal reflux disease)     well controlled with meds    Hypercholesteremia     Hypertension 20007    5yrs    Kidney stones     Nausea & vomiting     Pneumonia     S/P ablation of atrial fibrillation 10/8/2021       Current Outpatient Medications   Medication Sig Dispense Refill    Alphagan P 0.1 % ophthalmic solution Administer 1 Drop to left eye three (3) times daily. 1 mL 0    Cetirizine (ZyrTEC) 10 mg cap Take 10 mg by mouth daily. 1 Capsule 0    oxyCODONE-acetaminophen (PERCOCET) 5-325 mg per tablet Take 1 Tablet by mouth every six (6) hours as needed for Pain.  dofetilide (TIKOSYN) 500 mcg capsule Take 1 Capsule by mouth every twelve (12) hours every twelve (12) hours. 60 Capsule 2    cholecalciferol, vitamin D3, (Vitamin D3) 50 mcg (2,000 unit) tab Take 50 mcg by mouth daily.  aspirin delayed-release 81 mg tablet Take 81 mg by mouth daily.  irbesartan (AVAPRO) 150 mg tablet Take 150 mg by mouth daily.  spironolactone (ALDACTONE) 25 mg tablet Take 25 mg by mouth daily.       dilTIAZem ER (CARDIZEM CD) 180 mg capsule TAKE 1 CAPSULE BY ORAL ROUTE EVERY DAY  metoprolol tartrate (LOPRESSOR) 25 mg tablet Take 0.5 Tabs by mouth every twelve (12) hours. 60 Tab 1    rivaroxaban (XARELTO) 20 mg tab tablet Take 1 Tab by mouth daily (with dinner). 30 Tab 0    omeprazole (PRILOSEC) 40 mg capsule Take 40 mg by mouth daily.  rosuvastatin (CRESTOR) 20 mg tablet Take 20 mg by mouth nightly. Social History   reports that he has never smoked. He has never used smokeless tobacco.   reports no history of alcohol use. Family History  family history is not on file. Review of Systems  Except as stated above include:  Constitutional: Negative for fever, chills and malaise/fatigue. HEENT: No congestion or recent URI. Gastrointestinal: No nausea, vomiting, abdominal pain, bloody stools. Pulmonary:  Negative except as stated above. Cardiac:  Negative except as stated above. Musculoskeletal: Negative except as stated above. Neurological:  No localized symptoms. Skin:  Negative except as stated above. Psych:  Negative except as stated above. Endocrine:  Negative except as stated above. PHYSICAL EXAM  BP Readings from Last 3 Encounters:   10/26/21 (!) 120/56   10/21/21 (!) 90/50   10/14/21 128/72     Pulse Readings from Last 3 Encounters:   10/26/21 (!) 55   10/21/21 (!) 52   10/14/21 60     Wt Readings from Last 3 Encounters:   10/26/21 97.5 kg (215 lb)   10/21/21 98 kg (216 lb)   10/11/21 104.5 kg (230 lb 4.8 oz)     General:   Well developed, well groomed. Head/Neck:   No obvious jugular venous distention     No obvious carotid pulsations. No evidence of xanthelasma. Lungs:   No respiratory distress. Clear bilaterally. Heart:  Regular rate and rhythm. Normal S1/S2. Palpation grossly normal.    No significant murmurs, rubs or gallops. Abdomen:   Non-acute abdomen. No obvious pulsations. Extremities:   Intact peripheral pulses. No significant edema. Neurological:   Alert and oriented to person, place, time.       No focal neurological deficit visually. Skin:   No obvious rash    Blood Pressure Metric:  Monitor recommended and adjustments stated if needed.

## 2021-11-15 NOTE — PROGRESS NOTES
I have personally seen and evaluated the device findings. Interrogation reviewed and I agree with assessment.     Jessica Thomas

## 2021-12-15 ENCOUNTER — OFFICE VISIT (OUTPATIENT)
Dept: CARDIOLOGY CLINIC | Age: 71
End: 2021-12-15
Payer: MEDICARE

## 2021-12-15 ENCOUNTER — CLINICAL SUPPORT (OUTPATIENT)
Dept: CARDIOLOGY CLINIC | Age: 71
End: 2021-12-15

## 2021-12-15 VITALS
BODY MASS INDEX: 28.85 KG/M2 | SYSTOLIC BLOOD PRESSURE: 102 MMHG | DIASTOLIC BLOOD PRESSURE: 60 MMHG | OXYGEN SATURATION: 97 % | HEIGHT: 72 IN | HEART RATE: 56 BPM | WEIGHT: 213 LBS

## 2021-12-15 DIAGNOSIS — Z51.81 ENCOUNTER FOR MONITORING DOFETILIDE THERAPY: ICD-10-CM

## 2021-12-15 DIAGNOSIS — Z95.818 STATUS POST PLACEMENT OF IMPLANTABLE LOOP RECORDER: ICD-10-CM

## 2021-12-15 DIAGNOSIS — Z79.01 ON RIVAROXABAN THERAPY: ICD-10-CM

## 2021-12-15 DIAGNOSIS — Z86.79 S/P ABLATION OF ATRIAL FIBRILLATION: Primary | ICD-10-CM

## 2021-12-15 DIAGNOSIS — Z79.899 ENCOUNTER FOR MONITORING DOFETILIDE THERAPY: ICD-10-CM

## 2021-12-15 DIAGNOSIS — I48.0 PAROXYSMAL ATRIAL FIBRILLATION (HCC): ICD-10-CM

## 2021-12-15 DIAGNOSIS — I49.5 SICK SINUS SYNDROME (HCC): ICD-10-CM

## 2021-12-15 DIAGNOSIS — I50.32 DIASTOLIC CHF, CHRONIC (HCC): ICD-10-CM

## 2021-12-15 DIAGNOSIS — I48.0 PAROXYSMAL ATRIAL FIBRILLATION (HCC): Primary | ICD-10-CM

## 2021-12-15 DIAGNOSIS — Z98.890 S/P ABLATION OF ATRIAL FIBRILLATION: Primary | ICD-10-CM

## 2021-12-15 PROCEDURE — G8417 CALC BMI ABV UP PARAM F/U: HCPCS | Performed by: INTERNAL MEDICINE

## 2021-12-15 PROCEDURE — G8536 NO DOC ELDER MAL SCRN: HCPCS | Performed by: INTERNAL MEDICINE

## 2021-12-15 PROCEDURE — 93000 ELECTROCARDIOGRAM COMPLETE: CPT | Performed by: INTERNAL MEDICINE

## 2021-12-15 PROCEDURE — 93291 INTERROG DEV EVAL SCRMS IP: CPT | Performed by: INTERNAL MEDICINE

## 2021-12-15 PROCEDURE — G8754 DIAS BP LESS 90: HCPCS | Performed by: INTERNAL MEDICINE

## 2021-12-15 PROCEDURE — G8427 DOCREV CUR MEDS BY ELIG CLIN: HCPCS | Performed by: INTERNAL MEDICINE

## 2021-12-15 PROCEDURE — 1101F PT FALLS ASSESS-DOCD LE1/YR: CPT | Performed by: INTERNAL MEDICINE

## 2021-12-15 PROCEDURE — 99215 OFFICE O/P EST HI 40 MIN: CPT | Performed by: INTERNAL MEDICINE

## 2021-12-15 PROCEDURE — G8752 SYS BP LESS 140: HCPCS | Performed by: INTERNAL MEDICINE

## 2021-12-15 PROCEDURE — 3017F COLORECTAL CA SCREEN DOC REV: CPT | Performed by: INTERNAL MEDICINE

## 2021-12-15 PROCEDURE — G8510 SCR DEP NEG, NO PLAN REQD: HCPCS | Performed by: INTERNAL MEDICINE

## 2021-12-15 NOTE — PROGRESS NOTES
Roger Ashby presents today for   Chief Complaint   Patient presents with    Follow-up     1 month - no cardiac complaints        Roger Ashby preferred language for health care discussion is english/other. Is someone accompanying this pt? no    Is the patient using any DME equipment during [address]? no    Depression Screening:  3 most recent PHQ Screens 12/15/2021   Little interest or pleasure in doing things Not at all   Feeling down, depressed, irritable, or hopeless Not at all   Total Score PHQ 2 0       Learning Assessment:  Learning Assessment 5/19/2021   PRIMARY LEARNER Patient   HIGHEST LEVEL OF EDUCATION - PRIMARY LEARNER  -   BARRIERS PRIMARY LEARNER -   [facility] NAME -   PRIMARY LANGUAGE ENGLISH   LEARNER PREFERENCE PRIMARY DEMONSTRATION   ANSWERED BY patient   RELATIONSHIP SELF       Abuse Screening:  Abuse Screening Questionnaire 5/19/2021   Do you ever feel afraid of your partner? N   Are you in a relationship with someone who physically or mentally threatens you? N   Is it safe for you to go home? Y       Fall Risk  Fall Risk Assessment, last 12 mths 12/15/2021   Able to walk? Yes   Fall in past 12 months? 0   Do you feel unsteady? 0   Are you worried about falling 0       Pt currently taking Anticoagulant therapy? ASA 81mg every day and Xarelto     Coordination of Care:  1. Have you been to the ER, urgent care clinic since your last visit? Hospitalized since your last visit? no    2. Have you seen or consulted any other health care providers outside of the [address] [place] [address] since your last visit? Include any pap smears or colon screening.  no

## 2021-12-15 NOTE — PROGRESS NOTES
History of Present Illness:  70year old male here for follow up. Overall he is doing much better. I saw him November 11th after he had his epicardial ablation by Dr. Rafal Aceves October 8th. He had some prolonged postoperative nausea with some weight loss. He checked with his primary physician and he had endoscopy not too long ago. I did decrease his Cardizem and actually stopped it in November. He seems to be doing better, his energy is reasonable. No new chest pain, dyspnea, PND, orthopnea or edema. Impression:  1. Persistent postoperative nausea after epicardial ablation October, 2021, resolved. Possible exacerbation of reflux versus medications. 2. History of epicardial ablation October 8th, 2021 by Dr. Rafal Aceves, maintaining sinus rhythm. 3. S/P Tikosyn loading October 8th, 2021 with QT corrected interval of 460 milliseconds. 4. History of previous PVI with cryoablation July, 2021. Remote cardioversion prior. 5. History of sick sinus syndrome, limiting AV blocking agents. 6. Loop recorder placed July, 2021.  7. Chronic Xarelto use without bleeding issues. 8. Echo April, 2021 with normal function, mild left atrial enlargement. Plan:  He has a loop recorder in place, he has had no atrial fibrillation or significant pauses. I would not restart AV blocking agents at this point or additional AV blocking agents at this point as there is a higher risk for pacemaker. I would continue with Xarelto and I will see him back in six months. If he has had absolutely no events, it may be reasonable to stop the Xarelto. I would maintain Tikosyn and continue the low dose Metoprolol.         Past Medical History:   Diagnosis Date    Cancer ([facility] [address]     basal cell    Cholestanol storage disease     GERD (gastroesophageal reflux disease)     well controlled with meds    Hypercholesteremia     Hypertension 20007    5yrs    Kidney stones     Nausea & vomiting     Pneumonia     S/P ablation of atrial fibrillation 10/8/2021       Current Outpatient Medications   Medication Sig Dispense Refill    Alphagan P 0.1 % ophthalmic solution Administer 1 Drop to left eye three (3) times daily. 1 mL 0    Cetirizine (ZyrTEC) 10 mg cap Take 10 mg by mouth daily. 1 Capsule 0    oxyCODONE-acetaminophen (PERCOCET) 5-325 mg per tablet Take 1 Tablet by mouth every six (6) hours as needed for Pain.  dofetilide (TIKOSYN) 500 mcg capsule Take 1 Capsule by mouth every twelve (12) hours every twelve (12) hours. 60 Capsule 2    cholecalciferol, vitamin D3, (Vitamin D3) 50 mcg (2,000 unit) tab Take 50 mcg by mouth daily.  aspirin delayed-release 81 mg tablet Take 81 mg by mouth daily.  irbesartan (AVAPRO) 150 mg tablet Take 150 mg by mouth daily.  spironolactone (ALDACTONE) 25 mg tablet Take 25 mg by mouth daily.  metoprolol tartrate (LOPRESSOR) 25 mg tablet Take 0.5 Tabs by mouth every twelve (12) hours. 60 Tab 1    rivaroxaban (XARELTO) 20 mg tab tablet Take 1 Tab by mouth daily (with dinner). 30 Tab 0    omeprazole (PRILOSEC) 40 mg capsule Take 40 mg by mouth daily.  rosuvastatin (CRESTOR) 20 mg tablet Take 20 mg by mouth nightly. Social History   reports that he has never smoked. He has never used smokeless tobacco.   reports no history of alcohol use. Family History  family history is not on file. Review of Systems  Except as stated above include:  Constitutional: Negative for fever, chills and malaise/fatigue. HEENT: No congestion or recent URI. Gastrointestinal: No nausea, vomiting, abdominal pain, bloody stools. Pulmonary:  Negative except as stated above. Cardiac:  Negative except as stated above. Musculoskeletal: Negative except as stated above. Neurological:  No localized symptoms. Skin:  Negative except as stated above. Psych:  Negative except as stated above. Endocrine:  Negative except as stated above.     PHYSICAL EXAM  BP Readings from Last 3 Encounters: 12/15/21 102/60   11/11/21 110/74   10/26/21 (!) 120/56     Pulse Readings from Last 3 Encounters:   12/15/21 (!) 56   11/11/21 60   10/26/21 (!) 55     Wt Readings from Last 3 Encounters:   12/15/21 96.6 kg (213 lb)   11/11/21 94.8 kg (209 lb)   10/26/21 97.5 kg (215 lb)     General:   Well developed, well groomed. Head/Neck:   No obvious jugular venous distention     No obvious carotid pulsations. No evidence of xanthelasma. Lungs:   No respiratory distress. Clear bilaterally. Heart:  Regular rate and rhythm. Normal S1/S2. Palpation grossly normal.    No significant murmurs, rubs or gallops. ILR intact. Abdomen:   Non-acute abdomen. No obvious pulsations. Extremities:   Intact peripheral pulses. No significant edema. Neurological:   Alert and oriented to person, place, time. No focal neurological deficit visually. Skin:   No obvious rash    Blood Pressure Metric:  Monitor recommended and adjustments stated if needed.

## 2021-12-16 NOTE — PROGRESS NOTES
I have personally seen and evaluated the device findings. Interrogation reviewed and I agree with assessment.     Rama Palumbo

## 2022-01-13 RX ORDER — DOFETILIDE 0.5 MG/1
500 CAPSULE ORAL EVERY 12 HOURS
Qty: 60 CAPSULE | Refills: 6 | Status: SHIPPED | OUTPATIENT
Start: 2022-01-13 | End: 2022-07-18

## 2022-02-14 ENCOUNTER — HOSPITAL ENCOUNTER (EMERGENCY)
Age: 72
Discharge: HOME | End: 2022-02-15
Attending: EMERGENCY MEDICINE
Payer: COMMERCIAL

## 2022-02-14 DIAGNOSIS — L02.214 GROIN ABSCESS: Primary | ICD-10-CM

## 2022-02-14 LAB
ALBUMIN SERPL-MCNC: 3.3 G/DL (ref 3.4–5)
ALBUMIN/GLOB SERPL: 1.2 {RATIO} (ref 0.8–1.7)
ALP SERPL-CCNC: 69 U/L (ref 45–117)
ALT SERPL-CCNC: 13 U/L (ref 16–61)
ANION GAP SERPL CALC-SCNC: 8 MMOL/L (ref 3–18)
APPEARANCE UR: CLEAR
AST SERPL-CCNC: 13 U/L (ref 10–38)
BASOPHILS # BLD: 0 K/UL (ref 0–0.1)
BASOPHILS NFR BLD: 0 % (ref 0–2)
BILIRUB SERPL-MCNC: 0.4 MG/DL (ref 0.2–1)
BILIRUB UR QL: NEGATIVE
BUN SERPL-MCNC: 18 MG/DL (ref 7–18)
BUN/CREAT SERPL: 13 (ref 12–20)
CALCIUM SERPL-MCNC: 8.5 MG/DL (ref 8.5–10.1)
CHLORIDE SERPL-SCNC: 110 MMOL/L (ref 100–111)
CO2 SERPL-SCNC: 23 MMOL/L (ref 21–32)
COLOR UR: YELLOW
CREAT SERPL-MCNC: 1.34 MG/DL (ref 0.6–1.3)
DIFFERENTIAL METHOD BLD: ABNORMAL
EOSINOPHIL # BLD: 0.2 K/UL (ref 0–0.4)
EOSINOPHIL NFR BLD: 3 % (ref 0–5)
ERYTHROCYTE [DISTWIDTH] IN BLOOD BY AUTOMATED COUNT: 13.3 % (ref 11.6–14.5)
GLOBULIN SER CALC-MCNC: 2.8 G/DL (ref 2–4)
GLUCOSE SERPL-MCNC: 104 MG/DL (ref 74–99)
GLUCOSE UR STRIP.AUTO-MCNC: NEGATIVE MG/DL
HCT VFR BLD AUTO: 39.6 % (ref 36–48)
HGB BLD-MCNC: 13.4 G/DL (ref 13–16)
HGB UR QL STRIP: NEGATIVE
IMM GRANULOCYTES # BLD AUTO: 0 K/UL (ref 0–0.04)
IMM GRANULOCYTES NFR BLD AUTO: 0 % (ref 0–0.5)
KETONES UR QL STRIP.AUTO: ABNORMAL MG/DL
LACTATE BLD-SCNC: 0.84 MMOL/L (ref 0.4–2)
LEUKOCYTE ESTERASE UR QL STRIP.AUTO: NEGATIVE
LYMPHOCYTES # BLD: 0.9 K/UL (ref 0.9–3.6)
LYMPHOCYTES NFR BLD: 10 % (ref 21–52)
MCH RBC QN AUTO: 30.5 PG (ref 24–34)
MCHC RBC AUTO-ENTMCNC: 33.8 G/DL (ref 31–37)
MCV RBC AUTO: 90.2 FL (ref 78–100)
MONOCYTES # BLD: 1 K/UL (ref 0.05–1.2)
MONOCYTES NFR BLD: 11 % (ref 3–10)
NEUTS SEG # BLD: 7 K/UL (ref 1.8–8)
NEUTS SEG NFR BLD: 77 % (ref 40–73)
NITRITE UR QL STRIP.AUTO: NEGATIVE
NRBC # BLD: 0 K/UL (ref 0–0.01)
NRBC BLD-RTO: 0 PER 100 WBC
PH UR STRIP: 5 [PH] (ref 5–8)
PLATELET # BLD AUTO: 161 K/UL (ref 135–420)
PMV BLD AUTO: 11.6 FL (ref 9.2–11.8)
POTASSIUM SERPL-SCNC: 3.8 MMOL/L (ref 3.5–5.5)
PROT SERPL-MCNC: 6.1 G/DL (ref 6.4–8.2)
PROT UR STRIP-MCNC: NEGATIVE MG/DL
RBC # BLD AUTO: 4.39 M/UL (ref 4.35–5.65)
SODIUM SERPL-SCNC: 141 MMOL/L (ref 136–145)
SP GR UR REFRACTOMETRY: 1.02 (ref 1–1.03)
UROBILINOGEN UR QL STRIP.AUTO: 1 EU/DL (ref 0.2–1)
WBC # BLD AUTO: 9.2 K/UL (ref 4.6–13.2)

## 2022-02-14 PROCEDURE — 87205 SMEAR GRAM STAIN: CPT

## 2022-02-14 PROCEDURE — 75810000289 HC I&D ABSCESS SIMP/COMP/MULT

## 2022-02-14 PROCEDURE — 85025 COMPLETE CBC W/AUTO DIFF WBC: CPT

## 2022-02-14 PROCEDURE — 83605 ASSAY OF LACTIC ACID: CPT

## 2022-02-14 PROCEDURE — 74011250636 HC RX REV CODE- 250/636: Performed by: PHYSICIAN ASSISTANT

## 2022-02-14 PROCEDURE — 87077 CULTURE AEROBIC IDENTIFY: CPT

## 2022-02-14 PROCEDURE — 96365 THER/PROPH/DIAG IV INF INIT: CPT

## 2022-02-14 PROCEDURE — 80053 COMPREHEN METABOLIC PANEL: CPT

## 2022-02-14 PROCEDURE — 99284 EMERGENCY DEPT VISIT MOD MDM: CPT

## 2022-02-14 PROCEDURE — 87186 SC STD MICRODIL/AGAR DIL: CPT

## 2022-02-14 PROCEDURE — 81003 URINALYSIS AUTO W/O SCOPE: CPT

## 2022-02-14 RX ORDER — CLINDAMYCIN PHOSPHATE 900 MG/50ML
900 INJECTION, SOLUTION INTRAVENOUS
Status: COMPLETED | OUTPATIENT
Start: 2022-02-14 | End: 2022-02-14

## 2022-02-14 RX ADMIN — CLINDAMYCIN PHOSPHATE 900 MG: 900 INJECTION, SOLUTION INTRAVENOUS at 22:29

## 2022-02-15 ENCOUNTER — APPOINTMENT (OUTPATIENT)
Dept: CT IMAGING | Age: 72
End: 2022-02-15
Attending: PHYSICIAN ASSISTANT
Payer: COMMERCIAL

## 2022-02-15 VITALS
BODY MASS INDEX: 27.63 KG/M2 | OXYGEN SATURATION: 97 % | DIASTOLIC BLOOD PRESSURE: 57 MMHG | HEART RATE: 67 BPM | WEIGHT: 204 LBS | SYSTOLIC BLOOD PRESSURE: 117 MMHG | TEMPERATURE: 97.8 F | HEIGHT: 72 IN | RESPIRATION RATE: 18 BRPM

## 2022-02-15 PROCEDURE — 72193 CT PELVIS W/DYE: CPT

## 2022-02-15 PROCEDURE — 74011000636 HC RX REV CODE- 636: Performed by: EMERGENCY MEDICINE

## 2022-02-15 PROCEDURE — 74011250637 HC RX REV CODE- 250/637: Performed by: PHYSICIAN ASSISTANT

## 2022-02-15 RX ORDER — HYDROCODONE BITARTRATE AND ACETAMINOPHEN 5; 325 MG/1; MG/1
1 TABLET ORAL
Qty: 10 TABLET | Refills: 0 | Status: SHIPPED | OUTPATIENT
Start: 2022-02-15 | End: 2022-02-20

## 2022-02-15 RX ORDER — LIDOCAINE HYDROCHLORIDE 10 MG/ML
10 INJECTION INFILTRATION; PERINEURAL ONCE
Status: DISCONTINUED | OUTPATIENT
Start: 2022-02-15 | End: 2022-02-15

## 2022-02-15 RX ORDER — HYDROCODONE BITARTRATE AND ACETAMINOPHEN 5; 325 MG/1; MG/1
1 TABLET ORAL
Status: COMPLETED | OUTPATIENT
Start: 2022-02-15 | End: 2022-02-15

## 2022-02-15 RX ORDER — CLINDAMYCIN HYDROCHLORIDE 150 MG/1
300 CAPSULE ORAL EVERY 6 HOURS
Qty: 80 CAPSULE | Refills: 0 | Status: SHIPPED | OUTPATIENT
Start: 2022-02-15 | End: 2022-02-25

## 2022-02-15 RX ADMIN — IOPAMIDOL 100 ML: 612 INJECTION, SOLUTION INTRAVENOUS at 00:50

## 2022-02-15 RX ADMIN — HYDROCODONE BITARTRATE AND ACETAMINOPHEN 1 TABLET: 5; 325 TABLET ORAL at 02:44

## 2022-02-15 NOTE — ED PROVIDER NOTES
EMERGENCY DEPARTMENT HISTORY AND PHYSICAL EXAM    Date: 2/14/2022  Patient Name: Criselda Esparza    History of Presenting Illness     Chief Complaint   Patient presents with    Groin Swelling         History Provided By: Patient    Chief Complaint: left groin pain      Additional History (Context):   9:29 PM  Criselda Esparza is a 70 y.o. male with PMHX a fib, HTN presents to the emergency department C/O left groin pain that started yesterday and got worse today. States area is red and swollen. sts he noticed it while he was in the shower yesterday. He believes that he did notice some drainage earlier today. No fevers. No pain in the testicle itself. No abdominal pain. No nausea vomiting diarrhea constipation. No urinary changes or penile discharge. PCP: Henna Elliott MD    Current Outpatient Medications   Medication Sig Dispense Refill    clindamycin (CLEOCIN) 150 mg capsule Take 2 Capsules by mouth every six (6) hours for 10 days. 80 Capsule 0    HYDROcodone-acetaminophen (Norco) 5-325 mg per tablet Take 1 Tablet by mouth every six (6) hours as needed for Pain for up to 5 days. Max Daily Amount: 4 Tablets. 10 Tablet 0    dofetilide (TIKOSYN) 500 mcg capsule Take 1 Capsule by mouth every twelve (12) hours every twelve (12) hours. 60 Capsule 6    Alphagan P 0.1 % ophthalmic solution Administer 1 Drop to left eye three (3) times daily. 1 mL 0    Cetirizine (ZyrTEC) 10 mg cap Take 10 mg by mouth daily. 1 Capsule 0    cholecalciferol, vitamin D3, (Vitamin D3) 50 mcg (2,000 unit) tab Take 50 mcg by mouth daily.  aspirin delayed-release 81 mg tablet Take 81 mg by mouth daily.  irbesartan (AVAPRO) 150 mg tablet Take 150 mg by mouth daily.  spironolactone (ALDACTONE) 25 mg tablet Take 25 mg by mouth daily.  metoprolol tartrate (LOPRESSOR) 25 mg tablet Take 0.5 Tabs by mouth every twelve (12) hours.  60 Tab 1    rivaroxaban (XARELTO) 20 mg tab tablet Take 1 Tab by mouth daily (with dinner). 30 Tab 0    omeprazole (PRILOSEC) 40 mg capsule Take 40 mg by mouth daily.  rosuvastatin (CRESTOR) 20 mg tablet Take 20 mg by mouth nightly. Past History     Past Medical History:  Past Medical History:   Diagnosis Date    Cancer ([address]     basal cell    Cholestanol storage disease     GERD (gastroesophageal reflux disease)     well controlled with meds    Hypercholesteremia     Hypertension 62492    5yrs    Kidney stones     Nausea & vomiting     Pneumonia     S/P ablation of atrial fibrillation 10/8/2021       Past Surgical History:  Past Surgical History:   Procedure Laterality Date    CORONARY STENT EA ADDL VESSEL  12/4/2015    CORONARY STENT SINGLE W/PTCA  12/4/2015    HX ADENOIDECTOMY      HX APPENDECTOMY      HX CERVICAL FUSION  05/02/2012    HX HEENT  2013    sinus surgery    HX OTHER SURGICAL      basal cell removal from forehead    HX OTHER SURGICAL  2012    varicose vein surgery    HX TONSILLECTOMY      HX UROLOGICAL  4-13    lithotripsy    LEFT HEART PERCUTANEOUS  12/4/2015    TX CARDIAC SURG PROCEDURE UNLIST      TX CARDIOVERSION ELECTIVE ARRHYTHMIA EXTERNAL N/A 9/23/2019    EP CARDIOVERSION/KEEGAN prior performed by [facility] at [facility] CATH LAB    RT & LT HEART WITH C.O.  12/3/2017    BRYAN CORONARY ARTERIOGRAPH  12/4/2015    BRYAN CORONARY ARTERIOGRAPH  12/3/2017       Family History:  Family History   Problem Relation Age of Onset    Malignant Hyperthermia Neg Hx     Pseudocholinesterase Deficiency Neg Hx     Delayed Awakening Neg Hx     Post-op Nausea/Vomiting Neg Hx     Emergence Delirium Neg Hx        Social History:  Social History     Tobacco Use    Smoking status: Never Smoker    Smokeless tobacco: Never Used   Substance Use Topics    Alcohol use: No    Drug use: Never       Allergies:   Allergies   Allergen Reactions    Penicillins Rash and Itching       Review of Systems   Review of Systems   Constitutional: Negative for chills and fever. Respiratory: Negative for shortness of breath. Cardiovascular: Negative for chest pain. Gastrointestinal: Negative for abdominal pain, diarrhea, nausea and vomiting. Genitourinary: Negative for decreased urine volume, dysuria, enuresis, flank pain, frequency, genital sores, hematuria, penile discharge, penile pain, penile swelling, scrotal swelling, testicular pain and urgency. Musculoskeletal: Negative for back pain. Skin: Positive for wound. Neurological: Negative for weakness and numbness. All other systems reviewed and are negative. Physical Exam     Vitals:    02/14/22 2352 02/15/22 0002 02/15/22 0101 02/15/22 0158   BP: (!) 118/53 (!) 128/56 (!) 115/58 (!) 117/57   Pulse:       Resp:       Temp:       SpO2: 99% 98% 98% 97%   Weight:       Height:         Physical Exam  Vitals and nursing note reviewed. Constitutional:       Appearance: He is well-developed. Comments: Alert well-appearing nontoxic no acute distress   HENT:      Head: Normocephalic and atraumatic. Cardiovascular:      Rate and Rhythm: Normal rate and regular rhythm. Heart sounds: Normal heart sounds. No murmur heard. Pulmonary:      Effort: Pulmonary effort is normal. No respiratory distress. Breath sounds: Normal breath sounds. No wheezing or rales. Abdominal:      General: Bowel sounds are normal.      Palpations: Abdomen is soft. Tenderness: There is no abdominal tenderness. There is no right CVA tenderness or left CVA tenderness. Comments: Abdomen is soft and nontender   Genitourinary:      Musculoskeletal:      Cervical back: Normal range of motion and neck supple. Neurological:      Mental Status: He is alert and oriented to person, place, and time. Psychiatric:         Judgment: Judgment normal.           Diagnostic Study Results     Labs:   No results found for this or any previous visit (from the past 12 hour(s)).     Radiologic Studies:   CT PELV W CONT   Final Result   Left anterior apparent perineal skin thickening and subcutaneous   infiltration and a 1.7 cm subcutaneous collection which suggests   phlegmon/developing abscess. 7 mm calcification associated with the left ureter but without significant   apparent left-sided hydronephrosis. Correlation for this finding is needed. CT Results  (Last 48 hours)    None        CXR Results  (Last 48 hours)    None          Medical Decision Making   I am the first provider for this patient. I reviewed the vital signs, available nursing notes, past medical history, past surgical history, family history and social history. Vital Signs: Reviewed the patient's vital signs. Pulse Oximetry Analysis: 97% on RA       Records Reviewed: Nursing Notes and Old Medical Records    Procedures:  I&D Abcess Complex    Date/Time: 2/15/2022 2:31 AM  Performed by: RFANC Armenta  Authorized by: Pieter Palomino MD     Consent:     Consent obtained:  Verbal    Consent given by:  Patient    Risks discussed:  Bleeding, incomplete drainage, infection and pain  Location:     Type:  Abscess    Location:  Anogenital  Pre-procedure details:     Skin preparation:  Antiseptic wash  Anesthesia (see MAR for exact dosages): Anesthesia method:  Local infiltration    Local anesthetic:  Lidocaine 1% w/o epi  Procedure type:     Complexity:  Complex  Procedure details:     Needle aspiration: no      Incision types:  Single straight    Incision depth:  Subcutaneous    Scalpel blade:  11    Wound management:  Probed and deloculated and irrigated with saline    Drainage:  Bloody and purulent    Drainage amount:  Scant    Wound treatment:  Wound left open    Packing materials:  1/4 in iodoform gauze  Post-procedure details:     Patient tolerance of procedure: Tolerated well, no immediate complications        ED Course:   9:29 PM Initial assessment performed.  The patients presenting problems have been discussed, and they are in agreement with the care plan formulated and outlined with them. I have encouraged them to ask questions as they arise throughout their visit. Discussion:  Pt presents with left groin swelling and tenderness CT shows developing abscess. Area drained and packing placed. Vies patient to return in 48 hours for recheck. Patient given first dose of clindamycin in ED. Wound culture pending patient is otherwise healthy vital signs stable labs within normal limits will discharge    FACE-TO-FACE PROGRESS NOTE:    The patient presents with left groin pain. .  My exam shows approximately 5 x 5 cm cellulitic area left groin. Centrally there is an area oozing dark foul-smelling exudate. CT scan shows 0.7 cm subcutaneous collection fever of phlegmon or early abscess. Imp/plan: Discussed with DEE I&D and packing of the area and discharging patient on doxycycline with follow-up with urology or ED in 1 to 2 days. Patient given precautions for returning to ED, also educated on concern for Roberto's. Patient denies history of diabetes. I have personally seen and examined the patient, reviewed the DEE's note and agree with findings and plan. Written by aLshae Diego MD.    Diagnosis and Disposition     DISCHARGE NOTE:  Luis Flores's  results have been reviewed with him. He has been counseled regarding his diagnosis, treatment, and plan. He verbally conveys understanding and agreement of the signs, symptoms, diagnosis, treatment and prognosis and additionally agrees to follow up as discussed. He also agrees with the care-plan and conveys that all of his questions have been answered. I have also provided discharge instructions for him that include: educational information regarding their diagnosis and treatment, and list of reasons why they would want to return to the ED prior to their follow-up appointment, should his condition change.  He has been provided with education for proper emergency department utilization. CLINICAL IMPRESSION:    1. Groin abscess        PLAN:  1. D/C Home  2. Discharge Medication List as of 2/15/2022  2:30 AM      START taking these medications    Details   clindamycin (CLEOCIN) 150 mg capsule Take 2 Capsules by mouth every six (6) hours for 10 days. , Normal, Disp-80 Capsule, R-0      HYDROcodone-acetaminophen (Norco) 5-325 mg per tablet Take 1 Tablet by mouth every six (6) hours as needed for Pain for up to 5 days. Max Daily Amount: 4 Tablets., Normal, Disp-10 Tablet, R-0         CONTINUE these medications which have NOT CHANGED    Details   dofetilide (TIKOSYN) 500 mcg capsule Take 1 Capsule by mouth every twelve (12) hours every twelve (12) hours. , Normal, Disp-60 Capsule, R-6      Alphagan P 0.1 % ophthalmic solution Administer 1 Drop to left eye three (3) times daily. , No Print, Disp-1 mL, R-0, KARIN      Cetirizine (ZyrTEC) 10 mg cap Take 10 mg by mouth daily. , No Print, Disp-1 Capsule, R-0      cholecalciferol, vitamin D3, (Vitamin D3) 50 mcg (2,000 unit) tab Take 50 mcg by mouth daily. , Historical Med      aspirin delayed-release 81 mg tablet Take 81 mg by mouth daily. , Historical Med      irbesartan (AVAPRO) 150 mg tablet Take 150 mg by mouth daily. , Historical Med      spironolactone (ALDACTONE) 25 mg tablet Take 25 mg by mouth daily. , Historical Med      metoprolol tartrate (LOPRESSOR) 25 mg tablet Take 0.5 Tabs by mouth every twelve (12) hours. , Normal, Disp-60 Tab, R-1      rivaroxaban (XARELTO) 20 mg tab tablet Take 1 Tab by mouth daily (with dinner). , Normal, Disp-30 Tab, R-0      omeprazole (PRILOSEC) 40 mg capsule Take 40 mg by mouth daily. , Historical Med      rosuvastatin (CRESTOR) 20 mg tablet Take 20 mg by mouth nightly., Historical Med           3.    Follow-up Information     Follow up With Specialties Details Why Contact Info    Gosia Bolanos MD Family Medicine Schedule an appointment as soon as possible for a visit   [place] [address]  [phone]      Cary Davison MD Urology Schedule an appointment as soon as possible for a visit   [address] [id number] [phone]      [facility] EMERGENCY DEPT Emergency Medicine  If symptoms worsen [address] [place] [address] [phone]                 Please note that this dictation was completed with [employer], the computer voice recognition software. Quite often unanticipated grammatical, syntax, homophones, and other interpretive errors are inadvertently transcribed by the computer software. Please disregard these errors. Please excuse any errors that have escaped final proofreading.

## 2022-02-15 NOTE — ED TRIAGE NOTES
Patient arrives ambulatory to ED with c/c of swelling to his left groin, onset yesterday. He reports since yesterday it has increased in size and has become very painful. Patient reports he takes Xarelto daily for a-fib.

## 2022-02-16 ENCOUNTER — HOSPITAL ENCOUNTER (EMERGENCY)
Age: 72
Discharge: HOME | End: 2022-02-16
Attending: EMERGENCY MEDICINE
Payer: MEDICARE

## 2022-02-16 VITALS
BODY MASS INDEX: 27.77 KG/M2 | DIASTOLIC BLOOD PRESSURE: 48 MMHG | WEIGHT: 205 LBS | HEART RATE: 58 BPM | OXYGEN SATURATION: 100 % | HEIGHT: 72 IN | TEMPERATURE: 97.8 F | RESPIRATION RATE: 16 BRPM | SYSTOLIC BLOOD PRESSURE: 104 MMHG

## 2022-02-16 DIAGNOSIS — Z51.89 WOUND CHECK, ABSCESS: Primary | ICD-10-CM

## 2022-02-16 PROCEDURE — 99282 EMERGENCY DEPT VISIT SF MDM: CPT

## 2022-02-16 NOTE — ED TRIAGE NOTES
Patient reports he was here Monday and here to have the packing removed. States has left  Groin.  currently on atb

## 2022-02-16 NOTE — ED PROVIDER NOTES
EMERGENCY DEPARTMENT HISTORY AND PHYSICAL EXAM    Date: 2/16/2022  Patient Name: Ulysses Barba    History of Presenting Illness     Chief Complaint   Patient presents with    Wound Check         History Provided By: Patient    Additional History (Context): Ulysses Barba is a 70 y.o. male with hypertension and hyperlipidemia who presents with complaint of left groin abscess. Had I&D performed earlier this week and is taking his clindamycin. He feels like things are getting better. Denies fever or pain. PCP: Aby Barrett MD    Current Outpatient Medications   Medication Sig Dispense Refill    clindamycin (CLEOCIN) 150 mg capsule Take 2 Capsules by mouth every six (6) hours for 10 days. 80 Capsule 0    HYDROcodone-acetaminophen (Norco) 5-325 mg per tablet Take 1 Tablet by mouth every six (6) hours as needed for Pain for up to 5 days. Max Daily Amount: 4 Tablets. 10 Tablet 0    dofetilide (TIKOSYN) 500 mcg capsule Take 1 Capsule by mouth every twelve (12) hours every twelve (12) hours. 60 Capsule 6    Alphagan P 0.1 % ophthalmic solution Administer 1 Drop to left eye three (3) times daily. 1 mL 0    Cetirizine (ZyrTEC) 10 mg cap Take 10 mg by mouth daily. 1 Capsule 0    cholecalciferol, vitamin D3, (Vitamin D3) 50 mcg (2,000 unit) tab Take 50 mcg by mouth daily.  aspirin delayed-release 81 mg tablet Take 81 mg by mouth daily.  irbesartan (AVAPRO) 150 mg tablet Take 150 mg by mouth daily.  spironolactone (ALDACTONE) 25 mg tablet Take 25 mg by mouth daily.  metoprolol tartrate (LOPRESSOR) 25 mg tablet Take 0.5 Tabs by mouth every twelve (12) hours. 60 Tab 1    rivaroxaban (XARELTO) 20 mg tab tablet Take 1 Tab by mouth daily (with dinner). 30 Tab 0    omeprazole (PRILOSEC) 40 mg capsule Take 40 mg by mouth daily.  rosuvastatin (CRESTOR) 20 mg tablet Take 20 mg by mouth nightly.          Past History     Past Medical History:  Past Medical History:   Diagnosis Date    Cancer ([facility] [address]     basal cell    Cholestanol storage disease     GERD (gastroesophageal reflux disease)     well controlled with meds    Hypercholesteremia     Hypertension 53978    5yrs    Kidney stones     Nausea & vomiting     Pneumonia     S/P ablation of atrial fibrillation 10/8/2021       Past Surgical History:  Past Surgical History:   Procedure Laterality Date    CORONARY STENT EA ADDL VESSEL  12/4/2015    CORONARY STENT SINGLE W/PTCA  12/4/2015    HX ADENOIDECTOMY      HX APPENDECTOMY      HX CERVICAL FUSION  05/02/2012    HX HEENT  2013    sinus surgery    HX OTHER SURGICAL      basal cell removal from forehead    HX OTHER SURGICAL  2012    varicose vein surgery    HX TONSILLECTOMY      HX UROLOGICAL  4-13    lithotripsy    LEFT HEART PERCUTANEOUS  12/4/2015    MO CARDIAC SURG PROCEDURE UNLIST      MO CARDIOVERSION ELECTIVE ARRHYTHMIA EXTERNAL N/A 9/23/2019    EP CARDIOVERSION/KEEGAN prior performed by Rebeca Collazo MD at [facility] CATH LAB    RT & LT HEART WITH C.O.  12/3/2017    BRYAN CORONARY ARTERIOGRAPH  12/4/2015    BRYAN CORONARY ARTERIOGRAPH  12/3/2017       Family History:  Family History   Problem Relation Age of Onset    Malignant Hyperthermia Neg Hx     Pseudocholinesterase Deficiency Neg Hx     Delayed Awakening Neg Hx     Post-op Nausea/Vomiting Neg Hx     Emergence Delirium Neg Hx        Social History:  Social History     Tobacco Use    Smoking status: Never Smoker    Smokeless tobacco: Never Used   Substance Use Topics    Alcohol use: No    Drug use: Never       Allergies: Allergies   Allergen Reactions    Penicillins Rash and Itching         Review of Systems   Review of Systems   Constitutional: Negative for fever. Skin: Positive for wound.      All Other Systems Negative  Physical Exam     Vitals:    02/16/22 1439   BP: (!) 104/48   Pulse: (!) 58   Resp: 16   Temp: 97.8 °F (36.6 °C)   SpO2: 100%   Weight: 93 kg (205 lb)   Height: 6' (1.829 m)     Physical Exam  Vitals and nursing note reviewed. Constitutional:       General: He is not in acute distress. Appearance: He is well-developed. He is not ill-appearing, toxic-appearing or diaphoretic. HENT:      Head: Normocephalic and atraumatic. Neck:      Thyroid: No thyromegaly. Vascular: No carotid bruit. Trachea: No tracheal deviation. Cardiovascular:      Rate and Rhythm: Normal rate and regular rhythm. Heart sounds: Normal heart sounds. No murmur heard. No friction rub. No gallop. Pulmonary:      Effort: Pulmonary effort is normal. No respiratory distress. Breath sounds: Normal breath sounds. No stridor. No wheezing or rales. Chest:      Chest wall: No tenderness. Abdominal:      General: There is no distension. Palpations: Abdomen is soft. There is no mass. Tenderness: There is no abdominal tenderness. There is no guarding or rebound. Musculoskeletal:         General: Normal range of motion. Cervical back: Normal range of motion and neck supple. Skin:     General: Skin is warm and dry. Coloration: Skin is not pale. Findings: Lesion present. Comments: Left inguinal groin abscess with mucopurulent discharge on packing which was removed. Patient is nontender. Area is approximately now 4 x 3 cm in size. Area extends upwards onto his left inguinal area/mons not into the perineum. Neurological:      Mental Status: He is alert. Psychiatric:         Speech: Speech normal.         Behavior: Behavior normal.         Thought Content: Thought content normal.         Judgment: Judgment normal.              Diagnostic Study Results     Labs -   No results found for this or any previous visit (from the past 12 hour(s)).     Radiologic Studies -   No orders to display     CT Results  (Last 48 hours)               02/15/22 0050  CT PELV W CONT Final result    Impression:  Left anterior apparent perineal skin thickening and subcutaneous   infiltration and a 1.7 cm subcutaneous collection which suggests   phlegmon/developing abscess. 7 mm calcification associated with the left ureter but without significant   apparent left-sided hydronephrosis. Correlation for this finding is needed. Narrative:  EXAM: CT PELV W CONT       INDICATION: infectino       COMPARISON: April 20, 2013. TECHNIQUE:    Multislice helical CT was performed from the iliac crest to the symphysis pubis   during uneventful rapid bolus intravenous contrast administration. Oral   contrast was/was not administered. Coronal and sagittal reformations were   generated. CT dose reduction was achieved through use of a standardized   protocol tailored for this examination and automatic exposure control for dose   modulation. Digital imaging and communications in Medicine (DICOM) format image   data are available to nonaffiliated external healthcare facilities or entities   on a secure, media free, reciprocally searchable basis with patient   authorization for at least 12 months after this study. FINDINGS:   The visualized bowel within the pelvis is normal.        The urinary bladder is normal in appearance. There is a 7 mm calculus abutting/along the course of the distal left ureter. The visualized portions of the left kidney shows no evidence for hydronephrosis. There is no pelvic mass or adenopathy. There is no pelvic ascites or fluid   collection. There is no fracture or other osseous abnormality of the pelvis or hips. There is skin thickening as well as subcutaneous infiltration along the left   anterior perineum with a 1.7 cm subcutaneous collection. CXR Results  (Last 48 hours)    None            Medical Decision Making   I am the first provider for this patient. I reviewed the vital signs, available nursing notes, past medical history, past surgical history, family history and social history.     Vital Signs-Reviewed the patient's vital signs. Records Reviewed: Nursing Notes and Old Medical Records    Procedures:  Procedures    Provider Notes (Medical Decision Making): Healing abscess. Advised him to return in 2 more days for another wound check. Advised him to continue adhering to his antibiotic regimen. Advised him to return any sooner for worsening of any kind including fever return of pain or change in worsening size. MED RECONCILIATION:  No current facility-administered medications for this encounter. Current Outpatient Medications   Medication Sig    clindamycin (CLEOCIN) 150 mg capsule Take 2 Capsules by mouth every six (6) hours for 10 days.  HYDROcodone-acetaminophen (Norco) 5-325 mg per tablet Take 1 Tablet by mouth every six (6) hours as needed for Pain for up to 5 days. Max Daily Amount: 4 Tablets.  dofetilide (TIKOSYN) 500 mcg capsule Take 1 Capsule by mouth every twelve (12) hours every twelve (12) hours.  Alphagan P 0.1 % ophthalmic solution Administer 1 Drop to left eye three (3) times daily.  Cetirizine (ZyrTEC) 10 mg cap Take 10 mg by mouth daily.  cholecalciferol, vitamin D3, (Vitamin D3) 50 mcg (2,000 unit) tab Take 50 mcg by mouth daily.  aspirin delayed-release 81 mg tablet Take 81 mg by mouth daily.  irbesartan (AVAPRO) 150 mg tablet Take 150 mg by mouth daily.  spironolactone (ALDACTONE) 25 mg tablet Take 25 mg by mouth daily.  metoprolol tartrate (LOPRESSOR) 25 mg tablet Take 0.5 Tabs by mouth every twelve (12) hours.  rivaroxaban (XARELTO) 20 mg tab tablet Take 1 Tab by mouth daily (with dinner).  omeprazole (PRILOSEC) 40 mg capsule Take 40 mg by mouth daily.  rosuvastatin (CRESTOR) 20 mg tablet Take 20 mg by mouth nightly. Disposition:  home    DISCHARGE NOTE:   2:59 PM    Pt has been reexamined. Patient has no new complaints, changes, or physical findings. Care plan outlined and precautions discussed. Results of exam were reviewed with the patient.  All medications were reviewed with the patient. All of pt's questions and concerns were addressed. Patient was instructed and agrees to follow up with PCP, as well as to return to the ED upon further deterioration. Patient is ready to go home. Follow-up Information     Follow up With Specialties Details Why [address]    THE [facility] EMERGENCY DEPT Emergency Medicine In 2 days For wound re-check [address] [facility]    THE [facility] EMERGENCY DEPT Emergency Medicine  If symptoms worsen return immediately [address] [address]  [phone]          Current Discharge Medication List          Diagnosis     Clinical Impression:   1.  Wound check, abscess

## 2022-02-18 ENCOUNTER — HOSPITAL ENCOUNTER (EMERGENCY)
Age: 72
Discharge: HOME | End: 2022-02-18
Attending: EMERGENCY MEDICINE
Payer: MEDICARE

## 2022-02-18 VITALS
RESPIRATION RATE: 18 BRPM | SYSTOLIC BLOOD PRESSURE: 104 MMHG | BODY MASS INDEX: 27.8 KG/M2 | DIASTOLIC BLOOD PRESSURE: 54 MMHG | WEIGHT: 205 LBS | TEMPERATURE: 97.5 F | OXYGEN SATURATION: 100 % | HEART RATE: 78 BPM

## 2022-02-18 DIAGNOSIS — Z51.89 ENCOUNTER FOR WOUND RE-CHECK: Primary | ICD-10-CM

## 2022-02-18 PROCEDURE — 99281 EMR DPT VST MAYX REQ PHY/QHP: CPT

## 2022-02-18 PROCEDURE — 75810000275 HC EMERGENCY DEPT VISIT NO LEVEL OF CARE

## 2022-02-18 NOTE — ED PROVIDER NOTES
EMERGENCY DEPARTMENT HISTORY AND PHYSICAL EXAM    Date: 2/18/2022  Patient Name: Lito Orozco    History of Presenting Illness     Chief Complaint   Patient presents with    Wound Check         History Provided By: Patient    Chief Complaint: wound recheck       Additional History (Context):   3:54 PM  Lito Orozco is a 70 y.o. male with PMHX hypertension, GERD, high cholesterol presents to the emergency department C/O wound recheck. Patient was seen here on 2/14 for groin abscess and area was drained and packed patient was started on clindamycin. Patient was seen again on 2/16 for wound recheck. Packing removed. Advised to return in 2 days for recheck. Patient states that the area is much improved. He is still taking his clindamycin. Denies fevers. No further drainage. PCP: Sheeba Berrios MD    Current Outpatient Medications   Medication Sig Dispense Refill    clindamycin (CLEOCIN) 150 mg capsule Take 2 Capsules by mouth every six (6) hours for 10 days. 80 Capsule 0    HYDROcodone-acetaminophen (Norco) 5-325 mg per tablet Take 1 Tablet by mouth every six (6) hours as needed for Pain for up to 5 days. Max Daily Amount: 4 Tablets. 10 Tablet 0    dofetilide (TIKOSYN) 500 mcg capsule Take 1 Capsule by mouth every twelve (12) hours every twelve (12) hours. 60 Capsule 6    Alphagan P 0.1 % ophthalmic solution Administer 1 Drop to left eye three (3) times daily. 1 mL 0    Cetirizine (ZyrTEC) 10 mg cap Take 10 mg by mouth daily. 1 Capsule 0    cholecalciferol, vitamin D3, (Vitamin D3) 50 mcg (2,000 unit) tab Take 50 mcg by mouth daily.  aspirin delayed-release 81 mg tablet Take 81 mg by mouth daily.  irbesartan (AVAPRO) 150 mg tablet Take 150 mg by mouth daily.  spironolactone (ALDACTONE) 25 mg tablet Take 25 mg by mouth daily.  metoprolol tartrate (LOPRESSOR) 25 mg tablet Take 0.5 Tabs by mouth every twelve (12) hours.  60 Tab 1    rivaroxaban (XARELTO) 20 mg tab tablet Take 1 Tab by mouth daily (with dinner). 30 Tab 0    omeprazole (PRILOSEC) 40 mg capsule Take 40 mg by mouth daily.  rosuvastatin (CRESTOR) 20 mg tablet Take 20 mg by mouth nightly. Past History     Past Medical History:  Past Medical History:   Diagnosis Date    Cancer ([facility] [address]     basal cell    Cholestanol storage disease     GERD (gastroesophageal reflux disease)     well controlled with meds    Hypercholesteremia     Hypertension 12096    5yrs    Kidney stones     Nausea & vomiting     Pneumonia     S/P ablation of atrial fibrillation 10/8/2021       Past Surgical History:  Past Surgical History:   Procedure Laterality Date    CORONARY STENT EA ADDL VESSEL  12/4/2015    CORONARY STENT SINGLE W/PTCA  12/4/2015    HX ADENOIDECTOMY      HX APPENDECTOMY      HX CERVICAL FUSION  05/02/2012    HX HEENT  2013    sinus surgery    HX OTHER SURGICAL      basal cell removal from forehead    HX OTHER SURGICAL  2012    varicose vein surgery    HX TONSILLECTOMY      HX UROLOGICAL  4-13    lithotripsy    LEFT HEART PERCUTANEOUS  12/4/2015    GA CARDIAC SURG PROCEDURE UNLIST      GA CARDIOVERSION ELECTIVE ARRHYTHMIA EXTERNAL N/A 9/23/2019    EP CARDIOVERSION/KEEGAN prior performed by Gabrielle Hillman MD at [facility] CATH LAB    RT & LT HEART WITH C.O.  12/3/2017    BRYAN CORONARY ARTERIOGRAPH  12/4/2015    BRYAN CORONARY ARTERIOGRAPH  12/3/2017       Family History:  Family History   Problem Relation Age of Onset    Malignant Hyperthermia Neg Hx     Pseudocholinesterase Deficiency Neg Hx     Delayed Awakening Neg Hx     Post-op Nausea/Vomiting Neg Hx     Emergence Delirium Neg Hx        Social History:  Social History     Tobacco Use    Smoking status: Never Smoker    Smokeless tobacco: Never Used   Substance Use Topics    Alcohol use: No    Drug use: Never       Allergies:   Allergies   Allergen Reactions    Penicillins Rash and Itching       Review of Systems   Review of Systems Constitutional: Negative for chills and fever. Respiratory: Negative for shortness of breath. Cardiovascular: Negative for chest pain. Gastrointestinal: Negative for abdominal pain, diarrhea, nausea and vomiting. Skin: Positive for wound. Neurological: Negative for weakness and numbness. All other systems reviewed and are negative. Physical Exam     Vitals:    02/18/22 1450 02/18/22 1451   BP:  (!) 104/54   Pulse: 78    Resp: 18    Temp: 97.5 °F (36.4 °C)    SpO2: 100%    Weight: 93 kg (205 lb)      Physical Exam  Vitals and nursing note reviewed. Constitutional:       Appearance: He is well-developed. HENT:      Head: Normocephalic and atraumatic. Cardiovascular:      Rate and Rhythm: Normal rate and regular rhythm. Heart sounds: Normal heart sounds. No murmur heard. Pulmonary:      Effort: Pulmonary effort is normal. No respiratory distress. Breath sounds: Normal breath sounds. No wheezing or rales. Genitourinary:     Comments: Left groin with some remaining swelling but no erythema no drainage from the wound  Musculoskeletal:      Cervical back: Normal range of motion and neck supple. Neurological:      Mental Status: He is alert and oriented to person, place, and time. Psychiatric:         Judgment: Judgment normal.         Diagnostic Study Results     Labs:   No results found for this or any previous visit (from the past 12 hour(s)). Radiologic Studies:   No orders to display     CT Results  (Last 48 hours)    None        CXR Results  (Last 48 hours)    None          Medical Decision Making   I am the first provider for this patient. I reviewed the vital signs, available nursing notes, past medical history, past surgical history, family history and social history. Vital Signs: Reviewed the patient's vital signs.     Pulse Oximetry Analysis: 100% on RA       Records Reviewed: Nursing Notes and Old Medical Records    Procedures:  Procedures    ED Course: 3:54 PM Initial assessment performed. The patients presenting problems have been discussed, and they are in agreement with the care plan formulated and outlined with them. I have encouraged them to ask questions as they arise throughout their visit. Discussion:  Pt presents with wound recheck. Patient has been taking antibiotics and applying warm compresses. Patient had wound packing removed 2 days ago. States the area is vastly improved. No fevers. On exam swelling and erythema almost resolved will have patient finish the clindamycin and follow-up with his doctor. Strict return precautions given, pt offering no questions or complaints. Diagnosis and Disposition     DISCHARGE NOTE:  Curt1 Irineo Perez's  results have been reviewed with him. He has been counseled regarding his diagnosis, treatment, and plan. He verbally conveys understanding and agreement of the signs, symptoms, diagnosis, treatment and prognosis and additionally agrees to follow up as discussed. He also agrees with the care-plan and conveys that all of his questions have been answered. I have also provided discharge instructions for him that include: educational information regarding their diagnosis and treatment, and list of reasons why they would want to return to the ED prior to their follow-up appointment, should his condition change. He has been provided with education for proper emergency department utilization. CLINICAL IMPRESSION:    1. Encounter for wound re-check        PLAN:  1. D/C Home  2. Current Discharge Medication List        3.    Follow-up Information     Follow up With Specialties Details Why Contact Info    Mark Bishop MD Family Medicine Schedule an appointment as soon as possible for a visit   [address] [facility]      [facility] EMERGENCY DEPT Emergency Medicine  If symptoms worsen [address] [facility] [address]  [phone]                 Please note that this dictation was completed with [employer] the [employer] voice recognition software. Quite often unanticipated grammatical, syntax, homophones, and other interpretive errors are inadvertently transcribed by the computer software. Please disregard these errors. Please excuse any errors that have escaped final proofreading.

## 2022-02-19 LAB
BACTERIA SPEC CULT: ABNORMAL
BACTERIA SPEC CULT: ABNORMAL
GRAM STN SPEC: ABNORMAL
SERVICE CMNT-IMP: ABNORMAL

## 2022-02-20 ENCOUNTER — HOSPITAL ENCOUNTER (EMERGENCY)
Age: 72
Discharge: HOME | End: 2022-02-20
Attending: STUDENT IN AN ORGANIZED HEALTH CARE EDUCATION/TRAINING PROGRAM
Payer: COMMERCIAL

## 2022-02-20 VITALS
TEMPERATURE: 97.1 F | DIASTOLIC BLOOD PRESSURE: 51 MMHG | HEIGHT: 72 IN | BODY MASS INDEX: 27.77 KG/M2 | RESPIRATION RATE: 16 BRPM | WEIGHT: 205 LBS | SYSTOLIC BLOOD PRESSURE: 119 MMHG | HEART RATE: 54 BPM | OXYGEN SATURATION: 98 %

## 2022-02-20 DIAGNOSIS — Z51.89 WOUND CHECK, ABSCESS: Primary | ICD-10-CM

## 2022-02-20 PROCEDURE — 99283 EMERGENCY DEPT VISIT LOW MDM: CPT

## 2022-02-20 PROCEDURE — 93005 ELECTROCARDIOGRAM TRACING: CPT

## 2022-02-20 RX ORDER — CIPROFLOXACIN 500 MG/1
500 TABLET ORAL 2 TIMES DAILY
Qty: 20 TABLET | Refills: 0 | Status: CANCELLED | OUTPATIENT
Start: 2022-02-20 | End: 2022-03-02

## 2022-02-20 RX ORDER — CIPROFLOXACIN 500 MG/1
500 TABLET ORAL 2 TIMES DAILY
Qty: 10 TABLET | Refills: 0 | Status: SHIPPED | OUTPATIENT
Start: 2022-02-20 | End: 2022-02-25

## 2022-02-20 NOTE — ED PROVIDER NOTES
EMERGENCY DEPARTMENT HISTORY AND PHYSICAL EXAM    Date: 2/20/2022  Patient Name: Rissa Cordero    History of Presenting Illness     Chief Complaint   Patient presents with    Other         History Provided By: Patient        Additional History (Context): Rissa Cordero is a 70 y.o. male with hypertension, hyperlipidemia and A. fib, ablation, on dofetilide who presents with having been called for wound check to his left groin abscess. Patient's wound culture showed sensitivity to IV medications only save Cipro and Levaquin. I spoke with patient at home and asked him to come in for reevaluation. Patient has been taking his clindamycin and feels better. PCP: Ze Morales MD    Current Outpatient Medications   Medication Sig Dispense Refill    ciprofloxacin HCl (Cipro) 500 mg tablet Take 1 Tablet by mouth two (2) times a day for 5 days. 10 Tablet 0    clindamycin (CLEOCIN) 150 mg capsule Take 2 Capsules by mouth every six (6) hours for 10 days. 80 Capsule 0    HYDROcodone-acetaminophen (Norco) 5-325 mg per tablet Take 1 Tablet by mouth every six (6) hours as needed for Pain for up to 5 days. Max Daily Amount: 4 Tablets. 10 Tablet 0    dofetilide (TIKOSYN) 500 mcg capsule Take 1 Capsule by mouth every twelve (12) hours every twelve (12) hours. 60 Capsule 6    Alphagan P 0.1 % ophthalmic solution Administer 1 Drop to left eye three (3) times daily. 1 mL 0    Cetirizine (ZyrTEC) 10 mg cap Take 10 mg by mouth daily. 1 Capsule 0    cholecalciferol, vitamin D3, (Vitamin D3) 50 mcg (2,000 unit) tab Take 50 mcg by mouth daily.  aspirin delayed-release 81 mg tablet Take 81 mg by mouth daily.  irbesartan (AVAPRO) 150 mg tablet Take 150 mg by mouth daily.  spironolactone (ALDACTONE) 25 mg tablet Take 25 mg by mouth daily.  metoprolol tartrate (LOPRESSOR) 25 mg tablet Take 0.5 Tabs by mouth every twelve (12) hours.  60 Tab 1    rivaroxaban (XARELTO) 20 mg tab tablet Take 1 Tab by mouth daily (with dinner). 30 Tab 0    omeprazole (PRILOSEC) 40 mg capsule Take 40 mg by mouth daily.  rosuvastatin (CRESTOR) 20 mg tablet Take 20 mg by mouth nightly. Past History     Past Medical History:  Past Medical History:   Diagnosis Date    Cancer ([facility] [address]     basal cell    Cholestanol storage disease     GERD (gastroesophageal reflux disease)     well controlled with meds    Hypercholesteremia     Hypertension 46656    5yrs    Kidney stones     Nausea & vomiting     Pneumonia     S/P ablation of atrial fibrillation 10/8/2021       Past Surgical History:  Past Surgical History:   Procedure Laterality Date    CORONARY STENT EA ADDL VESSEL  12/4/2015    CORONARY STENT SINGLE W/PTCA  12/4/2015    HX ADENOIDECTOMY      HX APPENDECTOMY      HX CERVICAL FUSION  05/02/2012    HX HEENT  2013    sinus surgery    HX OTHER SURGICAL      basal cell removal from forehead    HX OTHER SURGICAL  2012    varicose vein surgery    HX TONSILLECTOMY      HX UROLOGICAL  4-13    lithotripsy    LEFT HEART PERCUTANEOUS  12/4/2015    VT CARDIAC SURG PROCEDURE UNLIST      VT CARDIOVERSION ELECTIVE ARRHYTHMIA EXTERNAL N/A 9/23/2019    EP CARDIOVERSION/KEEGAN prior performed by Oc Jeffries [facility] CATH LAB    RT & LT HEART WITH C.O.  12/3/2017    BRYAN CORONARY ARTERIOGRAPH  12/4/2015    BRYAN CORONARY ARTERIOGRAPH  12/3/2017       Family History:  Family History   Problem Relation Age of Onset    Malignant Hyperthermia Neg Hx     Pseudocholinesterase Deficiency Neg Hx     Delayed Awakening Neg Hx     Post-op Nausea/Vomiting Neg Hx     Emergence Delirium Neg Hx        Social History:  Social History     Tobacco Use    Smoking status: Never Smoker    Smokeless tobacco: Never Used   Substance Use Topics    Alcohol use: No    Drug use: Never       Allergies:   Allergies   Allergen Reactions    Penicillins Rash and Itching         Review of Systems   Review of Systems   Constitutional: Negative for fever. Skin: Positive for wound. All Other Systems Negative  Physical Exam     Vitals:    02/20/22 1659   BP: (!) 119/51   Pulse: (!) 54   Resp: 16   Temp: 97.1 °F (36.2 °C)   SpO2: 98%   Weight: 93 kg (205 lb)   Height: 6' (1.829 m)     Physical Exam  Vitals and nursing note reviewed. Constitutional:       General: He is not in acute distress. Appearance: He is well-developed. He is not ill-appearing, toxic-appearing or diaphoretic. HENT:      Head: Normocephalic and atraumatic. Neck:      Thyroid: No thyromegaly. Vascular: No carotid bruit. Trachea: No tracheal deviation. Cardiovascular:      Rate and Rhythm: Normal rate and regular rhythm. Heart sounds: Normal heart sounds. No murmur heard. No friction rub. No gallop. Pulmonary:      Effort: Pulmonary effort is normal. No respiratory distress. Breath sounds: Normal breath sounds. No stridor. No wheezing or rales. Chest:      Chest wall: No tenderness. Abdominal:      General: There is no distension. Palpations: Abdomen is soft. There is no mass. Tenderness: There is no abdominal tenderness. There is no guarding or rebound. Musculoskeletal:         General: Normal range of motion. Cervical back: Normal range of motion and neck supple. Skin:     General: Skin is warm and dry. Coloration: Skin is not pale. Findings: Lesion present. Comments: Left groin abscess healing well with minimal fluctuance drainage and redness much improved from prior visit this past week. Neurological:      Mental Status: He is alert. Psychiatric:         Speech: Speech normal.         Behavior: Behavior normal.         Thought Content: Thought content normal.         Judgment: Judgment normal.          Diagnostic Study Results     Labs -   No results found for this or any previous visit (from the past 12 hour(s)).     Radiologic Studies -   No orders to display     CT Results  (Last 48 hours) None        CXR Results  (Last 48 hours)    None            Medical Decision Making   I am the first provider for this patient. I reviewed the vital signs, available nursing notes, past medical history, past surgical history, family history and social history. Vital Signs-Reviewed the patient's vital signs. Records Reviewed: Nursing Notes, Old Medical Records and Previous Laboratory Studies    Procedures:  EKG    Date/Time: 2/20/2022 5:19 PM  Performed by: FRANC Cantu  Authorized by: FRANC Cantu     Interpretation:     Interpretation: abnormal    Rate:     ECG rate:  55    ECG rate assessment: bradycardic    Rhythm:     Rhythm: other rhythm      Rhythm comment:  Atrial bradycardia  Ectopy:     Ectopy: none    QRS:     QRS axis:  Indeterminate    QRS intervals:  Normal  Conduction:     Conduction: normal    ST segments:     ST segments:  Normal  T waves:     T waves: normal          Provider Notes (Medical Decision Making): Spoke with Dr. Kingsley Obando for infectious disease and recommending if patient is safe for discharge a 5-day course of Cipro and Levaquin as long as his dofetilide could be monitored. I spoke with Dr. Nandini Cox who is on-call for Dr. Mabel Ortega, cardiologist.  Recommends getting an EKG now and he will have his office speak with patient specifically Oma from his office regarding regular testing this week for arrhythmia specifically QTC prolongation. Sending medications to preferred pharmacy upon completion of EKG. QTc 424ms. MED RECONCILIATION:  No current facility-administered medications for this encounter. Current Outpatient Medications   Medication Sig    ciprofloxacin HCl (Cipro) 500 mg tablet Take 1 Tablet by mouth two (2) times a day for 5 days.  clindamycin (CLEOCIN) 150 mg capsule Take 2 Capsules by mouth every six (6) hours for 10 days.     HYDROcodone-acetaminophen (Norco) 5-325 mg per tablet Take 1 Tablet by mouth every six (6) hours as needed for Pain for up to 5 days. Max Daily Amount: 4 Tablets.  dofetilide (TIKOSYN) 500 mcg capsule Take 1 Capsule by mouth every twelve (12) hours every twelve (12) hours.  Alphagan P 0.1 % ophthalmic solution Administer 1 Drop to left eye three (3) times daily.  Cetirizine (ZyrTEC) 10 mg cap Take 10 mg by mouth daily.  cholecalciferol, vitamin D3, (Vitamin D3) 50 mcg (2,000 unit) tab Take 50 mcg by mouth daily.  aspirin delayed-release 81 mg tablet Take 81 mg by mouth daily.  irbesartan (AVAPRO) 150 mg tablet Take 150 mg by mouth daily.  spironolactone (ALDACTONE) 25 mg tablet Take 25 mg by mouth daily.  metoprolol tartrate (LOPRESSOR) 25 mg tablet Take 0.5 Tabs by mouth every twelve (12) hours.  rivaroxaban (XARELTO) 20 mg tab tablet Take 1 Tab by mouth daily (with dinner).  omeprazole (PRILOSEC) 40 mg capsule Take 40 mg by mouth daily.  rosuvastatin (CRESTOR) 20 mg tablet Take 20 mg by mouth nightly. Disposition:  home    DISCHARGE NOTE:   5:18 PM    Pt has been reexamined. Patient has no new complaints, changes, or physical findings. Care plan outlined and precautions discussed. Results of EKG were reviewed with the patient. All medications were reviewed with the patient; will d/c home with cipro. All of pt's questions and concerns were addressed. Patient was instructed and agrees to follow up with cardiology, PCP, as well as to return to the ED upon further deterioration. Patient is ready to go home.     Follow-up Information     Follow up With Specialties Details Why Contact Info    Mala Serrato MD Family Medicine Schedule an appointment as soon as possible for a visit in 1 day  [address] [employer]  [phone]      Argentina Farley MD Cardiology, Internal Medicine Schedule an appointment as soon as possible for a visit in 1 day for serial EKGs this week; please ask to speak with [address]  [address] [facility] EMERGENCY DEPT Emergency Medicine  If symptoms worsen return immediately [address] [address]  [phone]          Current Discharge Medication List      START taking these medications    Details   ciprofloxacin HCl (Cipro) 500 mg tablet Take 1 Tablet by mouth two (2) times a day for 5 days. Qty: 10 Tablet, Refills: 0  Start date: 2/20/2022, End date: 2/25/2022               Diagnosis     Clinical Impression:   1.  Wound check, abscess

## 2022-02-20 NOTE — CALL BACK NOTE
3:49 PM  Spoke with patient to confirm that he is indeed still taking his dofetilide. Ciprofloxacin for the Pseudomonas shows best susceptibility orally however this interacts with that heart medication. Patient is amenable to returning to the emergency department for reassessment. In the meantime, I spoke with Dr. Trinity Higginbotham I for infectious disease who recommends that if patient is doing poorly to have him admitted for IV antibiotics otherwise to plan on a discharge home with a short burst of Cipro 500 mg twice daily for 5 days and to have his dofetilide measured. Will speak with patient's cardiologist when patient arrives.   Patient updated with plan and will be arriving shortly to the ED.- davian, bonita

## 2022-02-21 ENCOUNTER — TELEPHONE (OUTPATIENT)
Dept: CARDIOLOGY CLINIC | Age: 72
End: 2022-02-21

## 2022-02-21 LAB
ATRIAL RATE: 55 BPM
CALCULATED P AXIS, ECG09: -66 DEGREES
CALCULATED R AXIS, ECG10: 11 DEGREES
CALCULATED T AXIS, ECG11: 42 DEGREES
DIAGNOSIS, 93000: NORMAL
P-R INTERVAL, ECG05: 152 MS
Q-T INTERVAL, ECG07: 444 MS
QRS DURATION, ECG06: 86 MS
QTC CALCULATION (BEZET), ECG08: 424 MS
VENTRICULAR RATE, ECG03: 55 BPM

## 2022-02-21 NOTE — TELEPHONE ENCOUNTER
Pt called and said he had been to Ed  Recently, and that the Dr there had wanted to start him on an antibiotic for a cyst he has, but that he wanted him to check with his cardiologist about whether he would be allowed this med. Asked pt if med was cipro and he said yes. Reviewed some of er visit notes and confirmed. Pt has a pacer, says he has sent transmissions prior so told him to send a transmission so nurse could forward to doctor to review. Pt said he would send transmission in an hour. Over course of day, kept checking [employer], but not found. Called pt back and he said that he had sent it. Asked him if his transmission had been sent and he said yes. Then he stated that he was going to be seeing his 'heart doctor\" tomorrow, Dr Twila Escobar. Pt said his transmissins come to this office, however, was not able to find pt currently registered with [employer]. Did remind pt that he should address the Cipro concerns then tomorrow when he sees Dr Roddy Edmonds in office tomorrow. Pt agreed with plan. Will call office back with any further questions.

## 2022-02-23 ENCOUNTER — TELEPHONE (OUTPATIENT)
Dept: CARDIOLOGY CLINIC | Age: 72
End: 2022-02-23

## 2022-02-23 NOTE — TELEPHONE ENCOUNTER
After speaking with Dr. Blake Bryan this am, about pt being prescribed Cipro for infection, Dr Blake Bryan wanted nurse to call pt back and tell him to stop taking. Called pt back, and he said that he had seen Dr Copeland Nicely yesterday in office, that he had done EKG and that he said that the EKG \"looked ok, he was happy with it. \" Advised pt that Dr Blake Bryan was pretty addiment about pt not taking cipro. The other suggested antibiotic was levaquin as noted in ED visit note, but nurse needed to check first with Dr Blake Bryan if that was ok. In meantime, pt to call his PCP Dr Dania Carter about getting a change in antibiotics. Pt did say that when he was in ED on Sunday, the ED doctor had checked with cardiologist on call to see if Cipro would be ok and had gotten an 'ok\" but unclear who that was. Spoke with Dr Blake Bryan about Levaquin and he said \"no\" to that one too [employer] were a no for this pt\". Called pt back to discuss. He said he had talked with Dr Dania Carter who deferred his antibiotic treatment to ED. Told pt that Dr Blake Bryan did not want pt to take any quinalone- related antibiotics due to their possible QT prolongation and him being on anti-arrythmics. Pt was a little difficult to convince to stop taking the Cipro because he said he took his am dose today, feels like he has been taking for 2.5 days now, and that his heart doctor yesterday said his EKG looked ok. Pt said that the ER doctor on Sunday told him his infection could turn into gangrene if not treated with an antibiotic. Pt said he would reach back to ED for further antibiotics, and told pt that if he met roadblocks, to call office and perhaps we could offer him some assistance with getting proper antibiotics. Pt said he would.

## 2022-02-23 NOTE — CALL BACK NOTE
Patient has called twice today requesting advice guarding the ciprofloxacin that he was prescribed at this ED 3 days ago. He has been seen several times over the past week and a half for a groin abscess/wound check, states he called his cardiologist due to his history of A. fib on Tikosyn and was told that he needed to discontinue the Cipro. I reviewed wound culture results which does not show a simple switch to another oral medication.   I advised patient that I cannot give medical advice over the phone without seeing him, but he should follow the advice of his cardiologist to return to ED for reevaluation

## 2022-03-18 ENCOUNTER — TELEPHONE (OUTPATIENT)
Dept: CARDIOLOGY CLINIC | Age: 72
End: 2022-03-18

## 2022-03-18 NOTE — TELEPHONE ENCOUNTER
Pt had called office to see if his Loop recorder was MRI compatible on Mar 16. Told pt would review records and call pt back. Checked device and pt reports Loop recorder information to Dr Pearl Hull office. Called pt back to tell him that in order to get the MRI clearance, he would have to reach out to Dr Pearl Hull office because they manage the Loop recorder. Pt a little discontent, but agreed to call that office.

## 2022-03-19 PROBLEM — I49.5 SICK SINUS SYNDROME (HCC): Status: ACTIVE | Noted: 2021-04-08

## 2022-03-19 PROBLEM — Z98.890 S/P ABLATION OF ATRIAL FIBRILLATION: Status: ACTIVE | Noted: 2021-10-08

## 2022-03-19 PROBLEM — R79.89 ELEVATED TROPONIN: Status: ACTIVE | Noted: 2017-11-24

## 2022-03-19 PROBLEM — R06.00 DYSPNEA: Status: ACTIVE | Noted: 2017-11-24

## 2022-03-19 PROBLEM — Z86.79 S/P ABLATION OF ATRIAL FIBRILLATION: Status: ACTIVE | Noted: 2021-10-08

## 2022-03-19 PROBLEM — I48.91 A-FIB (HCC): Status: ACTIVE | Noted: 2021-07-13

## 2022-03-19 PROBLEM — I95.9 HYPOTENSION: Status: ACTIVE | Noted: 2021-04-07

## 2022-03-19 PROBLEM — I50.33 DIASTOLIC CHF, ACUTE ON CHRONIC (HCC): Status: ACTIVE | Noted: 2017-11-24

## 2022-03-19 PROBLEM — R77.8 ELEVATED TROPONIN: Status: ACTIVE | Noted: 2017-11-24

## 2022-03-20 PROBLEM — N18.30 ACUTE RENAL FAILURE SUPERIMPOSED ON STAGE 3 CHRONIC KIDNEY DISEASE (HCC): Status: ACTIVE | Noted: 2021-04-07

## 2022-03-20 PROBLEM — R00.1 BRADYCARDIA: Status: ACTIVE | Noted: 2021-04-07

## 2022-03-20 PROBLEM — N17.9 ACUTE RENAL FAILURE SUPERIMPOSED ON STAGE 3 CHRONIC KIDNEY DISEASE (HCC): Status: ACTIVE | Noted: 2021-04-07

## 2022-06-22 ENCOUNTER — OFFICE VISIT (OUTPATIENT)
Dept: CARDIOLOGY CLINIC | Age: 72
End: 2022-06-22
Payer: MEDICARE

## 2022-06-22 VITALS
DIASTOLIC BLOOD PRESSURE: 80 MMHG | SYSTOLIC BLOOD PRESSURE: 118 MMHG | OXYGEN SATURATION: 96 % | HEIGHT: 72 IN | HEART RATE: 58 BPM | WEIGHT: 204 LBS | BODY MASS INDEX: 27.63 KG/M2

## 2022-06-22 DIAGNOSIS — I48.0 PAROXYSMAL ATRIAL FIBRILLATION (HCC): Primary | ICD-10-CM

## 2022-06-22 DIAGNOSIS — Z95.818 STATUS POST PLACEMENT OF IMPLANTABLE LOOP RECORDER: ICD-10-CM

## 2022-06-22 DIAGNOSIS — I42.9 CARDIOMYOPATHY, UNSPECIFIED TYPE (HCC): ICD-10-CM

## 2022-06-22 DIAGNOSIS — I49.5 SICK SINUS SYNDROME (HCC): ICD-10-CM

## 2022-06-22 DIAGNOSIS — Z86.79 S/P ABLATION OF ATRIAL FIBRILLATION: ICD-10-CM

## 2022-06-22 DIAGNOSIS — Z51.81 VISIT FOR MONITORING TIKOSYN THERAPY: ICD-10-CM

## 2022-06-22 DIAGNOSIS — Z98.890 S/P ABLATION OF ATRIAL FIBRILLATION: ICD-10-CM

## 2022-06-22 DIAGNOSIS — Z79.899 VISIT FOR MONITORING TIKOSYN THERAPY: ICD-10-CM

## 2022-06-22 PROCEDURE — 1123F ACP DISCUSS/DSCN MKR DOCD: CPT | Performed by: INTERNAL MEDICINE

## 2022-06-22 PROCEDURE — G8754 DIAS BP LESS 90: HCPCS | Performed by: INTERNAL MEDICINE

## 2022-06-22 PROCEDURE — G8752 SYS BP LESS 140: HCPCS | Performed by: INTERNAL MEDICINE

## 2022-06-22 PROCEDURE — G8417 CALC BMI ABV UP PARAM F/U: HCPCS | Performed by: INTERNAL MEDICINE

## 2022-06-22 PROCEDURE — 93000 ELECTROCARDIOGRAM COMPLETE: CPT | Performed by: INTERNAL MEDICINE

## 2022-06-22 PROCEDURE — 3017F COLORECTAL CA SCREEN DOC REV: CPT | Performed by: INTERNAL MEDICINE

## 2022-06-22 PROCEDURE — 99215 OFFICE O/P EST HI 40 MIN: CPT | Performed by: INTERNAL MEDICINE

## 2022-06-22 PROCEDURE — G8432 DEP SCR NOT DOC, RNG: HCPCS | Performed by: INTERNAL MEDICINE

## 2022-06-22 PROCEDURE — G8427 DOCREV CUR MEDS BY ELIG CLIN: HCPCS | Performed by: INTERNAL MEDICINE

## 2022-06-22 PROCEDURE — 1101F PT FALLS ASSESS-DOCD LE1/YR: CPT | Performed by: INTERNAL MEDICINE

## 2022-06-22 PROCEDURE — G8536 NO DOC ELDER MAL SCRN: HCPCS | Performed by: INTERNAL MEDICINE

## 2022-06-22 RX ORDER — NETARSUDIL 0.2 MG/ML
SOLUTION/ DROPS OPHTHALMIC; TOPICAL
COMMUNITY
Start: 2022-05-12

## 2022-06-22 RX ORDER — BRINZOLAMIDE/BRIMONIDINE TARTRATE 10; 2 MG/ML; MG/ML
SUSPENSION/ DROPS OPHTHALMIC
COMMUNITY
Start: 2022-06-11

## 2022-06-22 NOTE — PROGRESS NOTES
History of Present Illness:   70 YOM here for follow up. He is wondering if he can come off Xarelto. I performed PVI July 2021 and he had an epicardial ablation by Dr. Kiersten Malcolm October 2021. He had some postop nausea, but has been doing well. He still continues Xarelto. No new chest pain, dyspnea, PND, orthopnea, edema. He has a loop recorder in place. Impression:  1. Persistent postoperative nausea after epicardial ablation October 2021, resolved. 2. History of epicardial ablation 10/08/21 by Dr. Kiersten Malcolm, as well as left atrial clip. 3. Status post Tikosyn loading 10/08/21.  4. History of PVI with cryoballoon July 2021.  5. Sick sinus syndrome,limting AV blocking agents. 6. Loop recorder July 2021.  7. Chronic Xarelto use. 8. Echo April 2021 with mild left atrial enlargement. Plan:  His loop recorder shows no recurrent atrial fibrillation, but he is still at risk, especially the fact that he is maintained on Tikosyn and has had two ablations. I discussed stroke risk if he stops blood thinners. Although he has had an atrial appendage clip, there is still that risk, and so we did talk about stopping, but at this point I recommended we continue as long as he is not having any bleeding issues he will take it. I will see back in one year and he will continue to see Dr. Jacque Carrillo. Past Medical History:   Diagnosis Date    Cancer ([facility] [address]     basal cell    Cholestanol storage disease     GERD (gastroesophageal reflux disease)     well controlled with meds    Hypercholesteremia     Hypertension 20007    5yrs    Kidney stones     Nausea & vomiting     Pneumonia     S/P ablation of atrial fibrillation 10/8/2021       Current Outpatient Medications   Medication Sig Dispense Refill    dofetilide (TIKOSYN) 500 mcg capsule Take 1 Capsule by mouth every twelve (12) hours every twelve (12) hours. 60 Capsule 6    Cetirizine (ZyrTEC) 10 mg cap Take 10 mg by mouth daily.  1 Capsule 0    cholecalciferol, vitamin D3, (Vitamin D3) 50 mcg (2,000 unit) tab Take 50 mcg by mouth daily.  aspirin delayed-release 81 mg tablet Take 81 mg by mouth daily.  irbesartan (AVAPRO) 150 mg tablet Take 150 mg by mouth daily.  spironolactone (ALDACTONE) 25 mg tablet Take 25 mg by mouth daily.  metoprolol tartrate (LOPRESSOR) 25 mg tablet Take 0.5 Tabs by mouth every twelve (12) hours. 60 Tab 1    rivaroxaban (XARELTO) 20 mg tab tablet Take 1 Tab by mouth daily (with dinner). 30 Tab 0    omeprazole (PRILOSEC) 40 mg capsule Take 40 mg by mouth daily.  rosuvastatin (CRESTOR) 20 mg tablet Take 20 mg by mouth nightly.  Simbrinza 1-0.2 % drps       Rhopressa 0.02 % drop INSTILL 1 DROP INTO LEFT EYE NIGHTLY         Social History   reports that he has never smoked. He has never used smokeless tobacco.   reports no history of alcohol use. Family History  family history is not on file. Review of Systems  Except as stated above include:  Constitutional: Negative for fever, chills and malaise/fatigue. HEENT: No congestion or recent URI. Gastrointestinal: No nausea, vomiting, abdominal pain, bloody stools. Pulmonary:  Negative except as stated above. Cardiac:  Negative except as stated above. Musculoskeletal: Negative except as stated above. Neurological:  No localized symptoms. Skin:  Negative except as stated above. Psych:  Negative except as stated above. Endocrine:  Negative except as stated above. PHYSICAL EXAM  BP Readings from Last 3 Encounters:   06/22/22 118/80   02/20/22 (!) 119/51   02/18/22 (!) 104/54     Pulse Readings from Last 3 Encounters:   06/22/22 (!) 58   02/20/22 (!) 54   02/18/22 78     Wt Readings from Last 3 Encounters:   06/22/22 92.5 kg (204 lb)   02/20/22 93 kg (205 lb)   02/18/22 93 kg (205 lb)     General:   Well developed, well groomed. Head/Neck:   No obvious jugular venous distention     No obvious carotid pulsations. No evidence of xanthelasma.   Lungs:   No respiratory distress. Clear bilaterally. Heart:  Regular rate and rhythm. Normal S1/S2. Palpation grossly normal.    No significant murmurs, rubs or gallops. ILR intact. Abdomen:   Non-acute abdomen. No obvious pulsations. Extremities:   Intact peripheral pulses. No significant edema. Neurological:   Alert and oriented to person, place, time. No focal neurological deficit visually. Skin:   No obvious rash    Blood Pressure Metric:  Monitor recommended and adjustments stated if needed.

## 2022-06-22 NOTE — PROGRESS NOTES
Anna Strauss presents today for   Chief Complaint   Patient presents with    Follow-up     6 months        Anna Strauss preferred language for health care discussion is english/other. Is someone accompanying this pt? no    Is the patient using any DME equipment during [address]? no    Depression Screening:  3 most recent PHQ Screens 12/15/2021   Little interest or pleasure in doing things Not at all   Feeling down, depressed, irritable, or hopeless Not at all   Total Score PHQ 2 0       Learning Assessment:  Learning Assessment 5/19/2021   PRIMARY LEARNER Patient   HIGHEST LEVEL OF EDUCATION - PRIMARY LEARNER  -   BARRIERS PRIMARY LEARNER -   [facility] NAME -   PRIMARY LANGUAGE ENGLISH   LEARNER PREFERENCE PRIMARY DEMONSTRATION   ANSWERED BY patient   RELATIONSHIP SELF       Abuse Screening:  Abuse Screening Questionnaire 5/19/2021   Do you ever feel afraid of your partner? N   Are you in a relationship with someone who physically or mentally threatens you? N   Is it safe for you to go home? Y       Fall Risk  Fall Risk Assessment, last 12 mths 12/15/2021   Able to walk? Yes   Fall in past 12 months? 0   Do you feel unsteady? 0   Are you worried about falling 0       Pt currently taking Anticoagulant therapy? Xarelto    Coordination of Care:  1. Have you been to the ER, urgent care clinic since your last visit? Hospitalized since your last visit? no    2. Have you seen or consulted any other health care providers outside of the [address] [place] [address] since your last visit? Include any pap smears or colon screening.  No

## 2022-06-27 NOTE — PROGRESS NOTES
I have personally seen and evaluated the device findings. Interrogation reviewed and I agree with assessment.     Jeff Jay

## 2022-07-18 RX ORDER — DOFETILIDE 0.5 MG/1
500 CAPSULE ORAL EVERY 12 HOURS
Qty: 180 CAPSULE | Refills: 5 | Status: SHIPPED | OUTPATIENT
Start: 2022-07-18

## 2023-06-21 ENCOUNTER — OFFICE VISIT (OUTPATIENT)
Age: 73
End: 2023-06-21
Payer: MEDICARE

## 2023-06-21 ENCOUNTER — NURSE ONLY (OUTPATIENT)
Age: 73
End: 2023-06-21

## 2023-06-21 VITALS
OXYGEN SATURATION: 98 % | SYSTOLIC BLOOD PRESSURE: 138 MMHG | BODY MASS INDEX: 27.22 KG/M2 | WEIGHT: 201 LBS | HEART RATE: 52 BPM | DIASTOLIC BLOOD PRESSURE: 60 MMHG | HEIGHT: 72 IN

## 2023-06-21 DIAGNOSIS — I48.0 PAROXYSMAL ATRIAL FIBRILLATION (HCC): ICD-10-CM

## 2023-06-21 DIAGNOSIS — I49.5 SICK SINUS SYNDROME (HCC): ICD-10-CM

## 2023-06-21 DIAGNOSIS — I48.0 PAROXYSMAL ATRIAL FIBRILLATION (HCC): Primary | ICD-10-CM

## 2023-06-21 DIAGNOSIS — Z95.818 PRESENCE OF OTHER CARDIAC IMPLANTS AND GRAFTS: Primary | ICD-10-CM

## 2023-06-21 PROCEDURE — 3078F DIAST BP <80 MM HG: CPT | Performed by: INTERNAL MEDICINE

## 2023-06-21 PROCEDURE — 93000 ELECTROCARDIOGRAM COMPLETE: CPT | Performed by: INTERNAL MEDICINE

## 2023-06-21 PROCEDURE — 99214 OFFICE O/P EST MOD 30 MIN: CPT | Performed by: INTERNAL MEDICINE

## 2023-06-21 PROCEDURE — 1123F ACP DISCUSS/DSCN MKR DOCD: CPT | Performed by: INTERNAL MEDICINE

## 2023-06-21 PROCEDURE — 3075F SYST BP GE 130 - 139MM HG: CPT | Performed by: INTERNAL MEDICINE

## 2023-06-21 RX ORDER — METOPROLOL SUCCINATE 25 MG/1
TABLET, EXTENDED RELEASE ORAL
COMMUNITY
Start: 2023-06-13

## 2023-06-21 RX ORDER — LATANOPROSTENE BUNOD 0.24 MG/ML
SOLUTION/ DROPS OPHTHALMIC
COMMUNITY
Start: 2023-05-23

## 2023-06-21 RX ORDER — IRBESARTAN 75 MG/1
75 TABLET ORAL DAILY
COMMUNITY
Start: 2023-05-01

## 2023-06-21 RX ORDER — ESCITALOPRAM OXALATE 10 MG/1
10 TABLET ORAL DAILY
COMMUNITY
Start: 2023-06-03

## 2023-06-21 RX ORDER — TIMOLOL MALEATE 5 MG/ML
SOLUTION/ DROPS OPHTHALMIC
COMMUNITY
Start: 2023-05-23

## 2023-06-21 RX ORDER — DILTIAZEM HYDROCHLORIDE 180 MG/1
CAPSULE, COATED, EXTENDED RELEASE ORAL DAILY
COMMUNITY
Start: 2023-05-01

## 2023-06-21 NOTE — PROGRESS NOTES
HPI:  79-year-old male, here for followup. Overall, he is doing well. No new chest pain, dyspnea, PND, orthopnea, or edema. No bleeding issues. Impression:  History of paroxysmal atrial flutter with PVI with cryoballoon in July 2021 and then epicardial ablation in October 2021 with left atrial clip. History of Tikosyn in October 2021. Loop recorder in July 2021 without events. Chronic Xarelto use. Echocardiogram in April 2021 with mild left atrial enlargement. Plan:  He has some mild orthostasis at times when he bends over and gets up. He had a left atrial clip previously, but given his risk for recurrence and age, we decided to continue for now. If he does develop bleeding issues, I would stop. I will see back in one year.         Wt Readings from Last 3 Encounters:   06/21/23 201 lb (91.2 kg)   06/22/22 204 lb (92.5 kg)   12/15/21 213 lb (96.6 kg)     Past Medical History:   Diagnosis Date    Cancer ([facility] [address]     basal cell    Cholestanol storage disease     GERD (gastroesophageal reflux disease)     well controlled with meds    Hypercholesteremia     Hypertension 97307    5yrs    Kidney stones     Nausea & vomiting     Pneumonia     S/P ablation of atrial fibrillation 10/8/2021       Current Outpatient Medications   Medication Sig Dispense Refill    dilTIAZem (CARDIZEM CD) 180 MG extended release capsule Take by mouth daily      escitalopram (LEXAPRO) 10 MG tablet Take 1 tablet by mouth daily      VYZULTA 0.024 % SOLN INSTILL 1 DROP INTO BOTH EYES EVERY NIGHT AT BEDTIME      metoprolol succinate (TOPROL XL) 25 MG extended release tablet PLEASE SEE ATTACHED FOR DETAILED DIRECTIONS      irbesartan (AVAPRO) 75 MG tablet Take 1 tablet by mouth daily      timolol (TIMOPTIC) 0.5 % ophthalmic solution INSTIIL ONE DROP INTO BOTH EYES 3 TIMES A DAY      aspirin 81 MG EC tablet Take 1 tablet by mouth daily      brinzolamide-brimonidine (SIMBRINZA) 1-0.2 % SUSP ceived the following from [facility] -

## 2023-07-21 RX ORDER — DOFETILIDE 0.5 MG/1
CAPSULE ORAL
Qty: 180 CAPSULE | Refills: 5 | Status: SHIPPED | OUTPATIENT
Start: 2023-07-21

## 2024-06-25 ENCOUNTER — HOSPITAL ENCOUNTER (EMERGENCY)
Facility: HOSPITAL | Age: 74
Discharge: HOME | End: 2024-06-25
Payer: MEDICARE

## 2024-06-25 VITALS
OXYGEN SATURATION: 100 % | WEIGHT: 185 LBS | HEART RATE: 57 BPM | TEMPERATURE: 97 F | HEIGHT: 72 IN | RESPIRATION RATE: 18 BRPM | BODY MASS INDEX: 25.06 KG/M2 | DIASTOLIC BLOOD PRESSURE: 69 MMHG | SYSTOLIC BLOOD PRESSURE: 148 MMHG

## 2024-06-25 DIAGNOSIS — M54.16 LUMBAR BACK PAIN WITH RADICULOPATHY AFFECTING RIGHT LOWER EXTREMITY: Primary | ICD-10-CM

## 2024-06-25 PROCEDURE — 6370000000 HC RX 637 (ALT 250 FOR IP): Performed by: PHYSICIAN ASSISTANT

## 2024-06-25 PROCEDURE — 99283 EMERGENCY DEPT VISIT LOW MDM: CPT

## 2024-06-25 RX ORDER — PREDNISONE 20 MG/1
40 TABLET ORAL
Status: COMPLETED | OUTPATIENT
Start: 2024-06-25 | End: 2024-06-25

## 2024-06-25 RX ORDER — METHYLPREDNISOLONE 4 MG/1
TABLET ORAL
Qty: 1 KIT | Refills: 0 | Status: SHIPPED | OUTPATIENT
Start: 2024-06-25 | End: 2024-07-01

## 2024-06-25 RX ORDER — METHOCARBAMOL 750 MG/1
750 TABLET, FILM COATED ORAL EVERY 8 HOURS PRN
Qty: 12 TABLET | Refills: 0 | Status: SHIPPED | OUTPATIENT
Start: 2024-06-25

## 2024-06-25 RX ORDER — METHOCARBAMOL 500 MG/1
500 TABLET, FILM COATED ORAL
Status: COMPLETED | OUTPATIENT
Start: 2024-06-25 | End: 2024-06-25

## 2024-06-25 RX ADMIN — METHOCARBAMOL TABLETS 500 MG: 500 TABLET, COATED ORAL at 22:49

## 2024-06-25 RX ADMIN — PREDNISONE 40 MG: 20 TABLET ORAL at 22:49

## 2024-06-25 ASSESSMENT — PAIN DESCRIPTION - LOCATION: LOCATION: BACK

## 2024-06-25 ASSESSMENT — PAIN SCALES - GENERAL: PAINLEVEL_OUTOF10: 6

## 2024-06-26 NOTE — ED PROVIDER NOTES
[facility] EMERGENCY DEPT  EMERGENCY DEPARTMENT ENCOUNTER       Pt Name: Tadeo Unger  MRN: [id number]  Birthdate 1950  Date of evaluation: 6/25/2024  PCP: Tadeo Rucker MD  Note Started: 11:03 PM 6/25/24     CHIEF COMPLAINT       Chief Complaint   Patient presents with    Back Pain        HISTORY OF PRESENT ILLNESS: 1 or more elements      History From: Patient  HPI Limitations: None  Chronic Conditions: Hypertension, hyperlipidemia, A-fib on Xarelto  Social Determinants affecting Dx or Tx: none      Tadeo Unger is a 73 y.o. male who presents to ED c/o right lower back pain.  Patient states he was out working in the yard doing a lot of and sleeping.  Last night he felt some discomfort in his lower back but then it moved to his right lower when back/buttock area and radiates into his right hip.  Pain is exacerbated with certain movements and  sometimes when he bears weight on his right leg that sends sharp pain into his right lower back/hip.  Feels better with his right hip and knee flexed patient denies injury, trauma, abdominal pain, mid back pain, saddle anesthesia, bowel or bladder incontinence, extremity numbness or weakness and any other symptoms or complaints.     Nursing Notes were all reviewed and agreed with or any disagreements were addressed in the HPI.    PAST HISTORY     Past Medical History:  Past Medical History:   Diagnosis Date    Cancer ([facility])     basal cell    Cholestanol storage disease     GERD (gastroesophageal reflux disease)     well controlled with meds    Hypercholesteremia     Hypertension 78801    5yrs    Kidney stones     Nausea & vomiting     Pneumonia     S/P ablation of atrial fibrillation 10/8/2021       Past Surgical History:  Past Surgical History:   Procedure Laterality Date    ADENOIDECTOMY      APPENDECTOMY      CARDIOVERSION ELECTIVE ARRHYTHMIA EXTERNAL N/A 9/23/2019    EP CARDIOVERSION/KANA prior performed by Alvarez Peña MD at [facility] CARDIAC CATH LAB    CERVICAL

## 2024-06-26 NOTE — ED TRIAGE NOTES
Pt presents to ED with c/o right lower back pain that radiates into right hip. Endorses pain started on left side Saturday and has gradually radiated across back into left side. Denies urinary symptoms.     A&OX4, to triage via wheelchair.

## 2025-05-12 ENCOUNTER — HOSPITAL ENCOUNTER (OUTPATIENT)
Facility: HOSPITAL | Age: 75
Setting detail: OUTPATIENT SURGERY
Discharge: HOME | End: 2025-05-12
Attending: INTERNAL MEDICINE | Admitting: INTERNAL MEDICINE
Payer: MEDICARE

## 2025-05-12 VITALS
TEMPERATURE: 98 F | DIASTOLIC BLOOD PRESSURE: 48 MMHG | HEART RATE: 56 BPM | RESPIRATION RATE: 18 BRPM | BODY MASS INDEX: 25.41 KG/M2 | HEIGHT: 72 IN | OXYGEN SATURATION: 98 % | SYSTOLIC BLOOD PRESSURE: 141 MMHG | WEIGHT: 187.6 LBS

## 2025-05-12 DIAGNOSIS — R94.39 ABNORMAL STRESS TEST: ICD-10-CM

## 2025-05-12 DIAGNOSIS — I25.10 CAD (CORONARY ARTERY DISEASE): ICD-10-CM

## 2025-05-12 LAB
ANION GAP SERPL CALC-SCNC: 10 MMOL/L (ref 3–18)
BASOPHILS # BLD: 0.05 K/UL (ref 0–0.1)
BASOPHILS NFR BLD: 1.1 % (ref 0–2)
BUN SERPL-MCNC: 17 MG/DL (ref 6–23)
BUN/CREAT SERPL: 16 (ref 12–20)
CALCIUM SERPL-MCNC: 9.3 MG/DL (ref 8.5–10.1)
CHLORIDE SERPL-SCNC: 109 MMOL/L (ref 98–107)
CHOLEST SERPL-MCNC: 176 MG/DL
CO2 SERPL-SCNC: 22 MMOL/L (ref 21–32)
CREAT SERPL-MCNC: 1.08 MG/DL (ref 0.6–1.3)
DIFFERENTIAL METHOD BLD: ABNORMAL
ECHO BSA: 2.08 M2
EOSINOPHIL # BLD: 0.27 K/UL (ref 0–0.4)
EOSINOPHIL NFR BLD: 5.7 % (ref 0–5)
ERYTHROCYTE [DISTWIDTH] IN BLOOD BY AUTOMATED COUNT: 12.2 % (ref 11.6–14.5)
GLUCOSE SERPL-MCNC: 111 MG/DL (ref 74–108)
HCT VFR BLD AUTO: 43.9 % (ref 36–48)
HDLC SERPL-MCNC: 46 MG/DL (ref 40–60)
HDLC SERPL: 3.8 (ref 0–5)
HGB BLD-MCNC: 14.9 G/DL (ref 13–16)
IMM GRANULOCYTES # BLD AUTO: 0.01 K/UL (ref 0–0.04)
IMM GRANULOCYTES NFR BLD AUTO: 0.2 % (ref 0–0.5)
INR PPP: 1 (ref 0.9–1.1)
LDLC SERPL CALC-MCNC: 113 MG/DL (ref 0–100)
LYMPHOCYTES # BLD: 0.93 K/UL (ref 0.9–3.3)
LYMPHOCYTES NFR BLD: 19.6 % (ref 21–52)
MAGNESIUM SERPL-MCNC: 1.9 MG/DL (ref 1.6–2.6)
MCH RBC QN AUTO: 30.9 PG (ref 24–34)
MCHC RBC AUTO-ENTMCNC: 33.9 G/DL (ref 31–37)
MCV RBC AUTO: 91.1 FL (ref 78–100)
MONOCYTES # BLD: 0.49 K/UL (ref 0.05–1.2)
MONOCYTES NFR BLD: 10.3 % (ref 3–10)
NEUTS SEG # BLD: 3 K/UL (ref 1.8–8)
NEUTS SEG NFR BLD: 63.1 % (ref 40–73)
NRBC # BLD: 0 K/UL (ref 0–0.01)
NRBC BLD-RTO: 0 PER 100 WBC
PLATELET # BLD AUTO: 166 K/UL (ref 135–420)
PMV BLD AUTO: 11.8 FL (ref 9.2–11.8)
POTASSIUM SERPL-SCNC: 4.5 MMOL/L (ref 3.5–5.5)
PROTHROMBIN TIME: 13.2 SEC (ref 11.9–14.9)
RBC # BLD AUTO: 4.82 M/UL (ref 4.35–5.65)
SODIUM SERPL-SCNC: 141 MMOL/L (ref 136–145)
TRIGL SERPL-MCNC: 86 MG/DL (ref 0–150)
VLDLC SERPL CALC-MCNC: 17 MG/DL
WBC # BLD AUTO: 4.8 K/UL (ref 4.6–13.2)

## 2025-05-12 PROCEDURE — 83735 ASSAY OF MAGNESIUM: CPT

## 2025-05-12 PROCEDURE — 6360000004 HC RX CONTRAST MEDICATION: Performed by: INTERNAL MEDICINE

## 2025-05-12 PROCEDURE — 2580000003 HC RX 258: Performed by: INTERNAL MEDICINE

## 2025-05-12 PROCEDURE — 6370000000 HC RX 637 (ALT 250 FOR IP): Performed by: INTERNAL MEDICINE

## 2025-05-12 PROCEDURE — 93005 ELECTROCARDIOGRAM TRACING: CPT | Performed by: INTERNAL MEDICINE

## 2025-05-12 PROCEDURE — 36415 COLL VENOUS BLD VENIPUNCTURE: CPT

## 2025-05-12 PROCEDURE — 2500000003 HC RX 250 WO HCPCS: Performed by: INTERNAL MEDICINE

## 2025-05-12 PROCEDURE — 80048 BASIC METABOLIC PNL TOTAL CA: CPT

## 2025-05-12 PROCEDURE — 85025 COMPLETE CBC W/AUTO DIFF WBC: CPT

## 2025-05-12 PROCEDURE — 80061 LIPID PANEL: CPT

## 2025-05-12 PROCEDURE — 85610 PROTHROMBIN TIME: CPT

## 2025-05-12 PROCEDURE — C1894 INTRO/SHEATH, NON-LASER: HCPCS | Performed by: INTERNAL MEDICINE

## 2025-05-12 PROCEDURE — 2709999900 HC NON-CHARGEABLE SUPPLY: Performed by: INTERNAL MEDICINE

## 2025-05-12 PROCEDURE — 93458 L HRT ARTERY/VENTRICLE ANGIO: CPT | Performed by: INTERNAL MEDICINE

## 2025-05-12 PROCEDURE — 6360000002 HC RX W HCPCS: Performed by: INTERNAL MEDICINE

## 2025-05-12 PROCEDURE — C1769 GUIDE WIRE: HCPCS | Performed by: INTERNAL MEDICINE

## 2025-05-12 PROCEDURE — 99152 MOD SED SAME PHYS/QHP 5/>YRS: CPT | Performed by: INTERNAL MEDICINE

## 2025-05-12 RX ORDER — HEPARIN SODIUM 1000 [USP'U]/ML
INJECTION, SOLUTION INTRAVENOUS; SUBCUTANEOUS PRN
Status: DISCONTINUED | OUTPATIENT
Start: 2025-05-12 | End: 2025-05-12 | Stop reason: HOSPADM

## 2025-05-12 RX ORDER — SODIUM CHLORIDE 0.9 % (FLUSH) 0.9 %
5-40 SYRINGE (ML) INJECTION PRN
Status: DISCONTINUED | OUTPATIENT
Start: 2025-05-12 | End: 2025-05-12 | Stop reason: HOSPADM

## 2025-05-12 RX ORDER — VERAPAMIL HYDROCHLORIDE 2.5 MG/ML
INJECTION INTRAVENOUS PRN
Status: DISCONTINUED | OUTPATIENT
Start: 2025-05-12 | End: 2025-05-12 | Stop reason: HOSPADM

## 2025-05-12 RX ORDER — SODIUM CHLORIDE 0.9 % (FLUSH) 0.9 %
5-40 SYRINGE (ML) INJECTION EVERY 12 HOURS SCHEDULED
Status: DISCONTINUED | OUTPATIENT
Start: 2025-05-12 | End: 2025-05-12 | Stop reason: HOSPADM

## 2025-05-12 RX ORDER — IOPAMIDOL 612 MG/ML
INJECTION, SOLUTION INTRAVASCULAR PRN
Status: DISCONTINUED | OUTPATIENT
Start: 2025-05-12 | End: 2025-05-12 | Stop reason: HOSPADM

## 2025-05-12 RX ORDER — HEPARIN SODIUM 200 [USP'U]/100ML
INJECTION, SOLUTION INTRAVENOUS CONTINUOUS PRN
Status: COMPLETED | OUTPATIENT
Start: 2025-05-12 | End: 2025-05-12

## 2025-05-12 RX ORDER — MIDAZOLAM HYDROCHLORIDE 1 MG/ML
INJECTION INTRAMUSCULAR; INTRAVENOUS PRN
Status: DISCONTINUED | OUTPATIENT
Start: 2025-05-12 | End: 2025-05-12 | Stop reason: HOSPADM

## 2025-05-12 RX ORDER — SODIUM CHLORIDE 9 MG/ML
INJECTION, SOLUTION INTRAVENOUS ONCE
Status: COMPLETED | OUTPATIENT
Start: 2025-05-12 | End: 2025-05-12

## 2025-05-12 RX ORDER — ACETAMINOPHEN 325 MG/1
650 TABLET ORAL EVERY 4 HOURS PRN
Status: DISCONTINUED | OUTPATIENT
Start: 2025-05-12 | End: 2025-05-12 | Stop reason: HOSPADM

## 2025-05-12 RX ORDER — ASPIRIN 81 MG/1
81 TABLET, CHEWABLE ORAL ONCE
Status: COMPLETED | OUTPATIENT
Start: 2025-05-12 | End: 2025-05-12

## 2025-05-12 RX ORDER — SODIUM CHLORIDE 9 MG/ML
INJECTION, SOLUTION INTRAVENOUS PRN
Status: DISCONTINUED | OUTPATIENT
Start: 2025-05-12 | End: 2025-05-12 | Stop reason: HOSPADM

## 2025-05-12 RX ORDER — LIDOCAINE HYDROCHLORIDE 10 MG/ML
INJECTION, SOLUTION INFILTRATION; PERINEURAL PRN
Status: DISCONTINUED | OUTPATIENT
Start: 2025-05-12 | End: 2025-05-12 | Stop reason: HOSPADM

## 2025-05-12 RX ADMIN — SODIUM CHLORIDE: 0.9 INJECTION, SOLUTION INTRAVENOUS at 11:41

## 2025-05-12 RX ADMIN — ASPIRIN 81 MG: 81 TABLET, CHEWABLE ORAL at 11:44

## 2025-05-12 NOTE — BRIEF OP NOTE
Brief Postoperative Note      Patient: Tadeo Unger  YOB: 1950  MRN: [id number]    Date of Procedure: 5/12/2025    Pre-Op Diagnosis Codes:      * CAD (coronary artery disease) [I25.10]     * Abnormal stress test [R94.39]    Post-Op Diagnosis: Same       Procedure(s):  Left heart cath / coronary angiography    Surgeon(s):  Dell Mao MD    Assistant:  * No surgical staff found *    Anesthesia: IV Sedation    Estimated Blood Loss (mL): Minimal    Complications: None    Specimens:   * No specimens in log *    Implants:  * No implants in log *      Drains: * No LDAs found *    Findings:  Infection Present At Time Of Surgery (PATOS) (choose all levels that have infection present):  No infection present  Other Findings:   Small nondominant RCA   Rest of the coronary anatomy is widely patent  Patent stent in the left circumflex artery.  Complete report to follow.  Thanks    Electronically signed by DELL MAO MD on 5/12/2025 at 2:05 PM

## 2025-05-12 NOTE — INTERVAL H&P NOTE
Update History & Physical    The patient's History and Physical of May 12, 2025 was reviewed with the patient and I examined the patient. There was no change. The surgical site was confirmed by the patient and me.     Plan: The risks, benefits, expected outcome, and alternative to the recommended procedure have been discussed with the patient. Patient understands and wants to proceed with the procedure.     Electronically signed by ILIA CHANDLER MD on 5/12/2025 at 12:58 PM

## 2025-05-12 NOTE — DISCHARGE INSTRUCTIONS
General Discharge: Care Instructions  Your Care Instructions     One or more doctors have given you a physical exam, reviewed your symptoms, and asked about your past medical problems. You have had tests as needed.  At this time, the doctors feel it is safe for you to go home.  It is very important that you follow up with your doctor as directed. If you continue to have symptoms, you may need more tests or treatment.  The doctor has checked you carefully, but problems can develop later. If you notice any problems or new symptoms,  get medical treatment right away.  Follow-up care is a key part of your treatment and safety. Be sure to make and go to all appointments, and call your doctor if you are having problems. It's also a good idea to know your test results and keep a list of the medicines you take.  How can you care for yourself at home?  Keep track of any new symptoms or changes in your symptoms.  Rest until you feel better.  Be safe with medicines. Take your medicines exactly as prescribed. Call your doctor if you think you are having a problem with your medicine.  Do not drive after taking a prescription pain medicine.  When should you call for help?   Call 911 anytime you think you may need emergency care. For example, call if:    You passed out (lost consciousness).   Call your doctor now or seek immediate medical care if:    You have new symptoms like fever, difficulty breathing, vomiting, or rash.     You have new or different pain.     You are confused and are having trouble thinking clearly.     Your symptoms are getting worse.   Watch closely for changes in your health, and be sure to contact your doctor if:    You do not get better as expected.   Where can you learn more?  Go to https://www.[employer].net/patientEd and enter G150 to learn more about \"General Discharge: Care Instructions.\"  Current as of: July 31, 2024  Content Version: 14.4  © 9468-7452 [facility] [employer] [facility].   Care

## 2025-05-12 NOTE — PROGRESS NOTES
Pt is all prepped and ready for procedure.    1300 Pt picked up for procedure.     1345 Pt back to the care unit s/p [facility]. Pt awake and alert and tolerated procedure well. Right wrist accessed and tr band in place with immobilizer present. Post op care and precautions reinforced.     1415 DR Mao in to speak with patient and family.     1510 Tr band removed and tolerated well. No bleeding notes to site. Sterile 2x2 with tegaderm dressing applied to site. Pulses palpable. Immobilizer back in place. Post procedure discharge education initiated. Pt verbalized understandings.     1600 Discharge instructions reviewed with patient and spouse and daughter and they verbalized understandings. Pt ambulated to bathroom and tolerated well.     1630 Pt escorted to car and left with spouse in stable condition.

## 2025-05-12 NOTE — PROGRESS NOTES
All risks, benefits and alternatives explained to patient and they verbally understood.  Discussed with patient about risk of cardiac catheterization including but not limited to hematoma, retroperitoneal bleed, pseudoaneurysm, AV fistula, dissection, thrombosis and embolism, radial artery occlusion, myocardial infarction, CVA, TIA, dissection and perforation of great vessels, cardiac perforation, tamponade, atheroembolism, allergy reaction, infection, acute renal failure, arrhythmias, radiation injury, congestive heart failure, respiratory failure, multiorgan failure, death.  Patient agreed for procedure

## 2025-05-12 NOTE — POST SEDATION
Sedation Post Procedure Note    Patient Name: Tadeo Unger   YOB: 1950  Room/Bed: [facility]/  Medical Record Number: [id number]  Date: 5/12/2025   Time: 2:04 PM         Physicians/Assistants: ILIA CHANDLER MD, MD    Procedure Performed: Left heart catheterization, coronary angiogram    Post-Sedation Vital Signs:  Vitals:    05/12/25 1343   BP: (!) 148/65   Pulse: 52   Resp: 20   Temp:    SpO2: 98%      Vital signs were reviewed and were stable after the procedure (see flow sheet for vitals)            Post-Sedation Exam: Lungs: clear to auscultation bilaterally without crackles or wheezing and Cardiovascular: regular rate and rhythm, no murmurs rubs or gallops           Complications: none    Electronically signed by ILIA CHANDLER MD on 5/12/2025 at 2:04 PM

## 2025-05-13 LAB
EKG ATRIAL RATE: 56 BPM
EKG DIAGNOSIS: NORMAL
EKG P AXIS: 23 DEGREES
EKG P-R INTERVAL: 178 MS
EKG Q-T INTERVAL: 420 MS
EKG QRS DURATION: 92 MS
EKG QTC CALCULATION (BAZETT): 405 MS
EKG R AXIS: 50 DEGREES
EKG T AXIS: 24 DEGREES
EKG VENTRICULAR RATE: 56 BPM

## (undated) DEVICE — DRAPE,ANGIO,BRACH,STERILE,38X44: Brand: MEDLINE

## (undated) DEVICE — INTRODUCER SHTH 6FR CANN L11CM DIL TIP 35MM GRN TUNGSTEN

## (undated) DEVICE — GUIDE SURG SELECTION FOR GILLINOV COSGROVE LAA EXCLUSION SYS

## (undated) DEVICE — DRAPE TOWEL: Brand: CONVERTORS

## (undated) DEVICE — STOPCOCK TRNSDUC 500PSI 3 W ROT M LUER LT BLU OFF HNDL R

## (undated) DEVICE — REM POLYHESIVE ADULT PATIENT RETURN ELECTRODE: Brand: VALLEYLAB

## (undated) DEVICE — GUIDEWIRE VASC L260CM DIA0.035IN TIP L3MM STD EXCHG PTFE J

## (undated) DEVICE — NEEDLE HYPO 22GA L1.5IN BLK S STL HUB POLYPR SHLD REG BVL

## (undated) DEVICE — MEDI-VAC SUCTION HIGH CAPACITY: Brand: CARDINAL HEALTH

## (undated) DEVICE — MEDI-VAC NON-CONDUCTIVE SUCTION TUBING 7MM X 6.1M (20 FT.) L: Brand: CARDINAL HEALTH

## (undated) DEVICE — COVADERM: Brand: DEROYAL

## (undated) DEVICE — GUIDEWIRE VASC L150CM DIA0.035IN FLX END L7CM J 3MM PTFE

## (undated) DEVICE — GARMENT,MEDLINE,DVT,INT,CALF,MED, GEN2: Brand: MEDLINE

## (undated) DEVICE — TRAY,URINE METER,100% SILICONE,16FR10ML: Brand: MEDLINE

## (undated) DEVICE — CATH RMFG EP 6FR 10PLR 2/5/2MM --

## (undated) DEVICE — INTRODUCER SHTH 8FR CANN L11CM DIL TIP 35MM BLU TUNGSTEN

## (undated) DEVICE — RADIFOCUS OPTITORQUE ANGIOGRAPHIC CATHETER: Brand: OPTITORQUE

## (undated) DEVICE — BAND COMPR L24CM REG CLR PLAS HEMSTAT EXT HK AND LOOP RETEN

## (undated) DEVICE — Device

## (undated) DEVICE — SUTURE MCRYL SZ 4 0 L18IN ABSRB VLT PS 1 L24MM 3 8 CIR REV Y682H

## (undated) DEVICE — SENSOR PLSE OXMTR AD CBL L36IN ADH FRM FIT SPO2 DISP

## (undated) DEVICE — PAD,NON-ADHERENT,3X8,STERILE,LF,1/PK: Brand: MEDLINE

## (undated) DEVICE — SPONGE LAP 18X18IN STRL -- 5/PK

## (undated) DEVICE — INTRODUCER SHTH 9FR CANN L23CM DIL TIP 35MM BLK W/O MINI

## (undated) DEVICE — BAG WST COLL CLR DISP PVC W/ ROTICULATING LUER L BOR TBNG

## (undated) DEVICE — ELECTRODES PAIR QUIK COMBO CONN RTS DEFIB

## (undated) DEVICE — MEDI-TRACE CADENCE ADULT, DEFIBRILLATION ELECTRODE -RTS  (10 PR/PK) - PHYSIO-CONTROL: Brand: MEDI-TRACE CADENCE

## (undated) DEVICE — WOUND RETRACTOR AND PROTECTOR: Brand: ALEXIS WOUND PROTECTOR-RETRACTOR

## (undated) DEVICE — PACK PROCEDURE SURG VASC CATH 161 MMC LF

## (undated) DEVICE — SOLUTION IRRIG 1000ML 0.9% SOD CHL CONT

## (undated) DEVICE — GLOVE SURG SZ 8 L11.77IN FNGR THK9.8MIL STRW LTX POLYMER

## (undated) DEVICE — 3M™ IOBAN™ 2 ANTIMICROBIAL INCISE DRAPE 6640EZ: Brand: IOBAN™ 2

## (undated) DEVICE — TAPE,CLOTH/SILK,CURAD,3"X10YD,LF,40/CS: Brand: CURAD

## (undated) DEVICE — INTRO SHTH FST-CTH 0.038INX14 --

## (undated) DEVICE — 450 ML BOTTLE OF 0.05% CHLORHEXIDINE GLUCONATE IN 99.95% STERILE WATER FOR IRRIGATION, USP AND APPLICATOR.: Brand: IRRISEPT ANTIMICROBIAL WOUND LAVAGE

## (undated) DEVICE — BAG PRSS INFUS IV OR ART 3000 CC W STPCOCK NO THUMBWHEEL W

## (undated) DEVICE — SUTURE ABSORBABLE BRAIDED 2-0 CT-1 27 IN UD VICRYL J259H

## (undated) DEVICE — SUTURE VCRL SZ 3-0 L27IN ABSRB UD L26MM SH 1/2 CIR J416H

## (undated) DEVICE — PREP SKN CHLRAPRP APL 26ML STR --

## (undated) DEVICE — SYR 10ML LUER LOK 1/5ML GRAD --

## (undated) DEVICE — PAD NON-ADHERENT 3X4 STRL LF --

## (undated) DEVICE — INTRODUCER SHTH 9FR CANN L11CM DIL TIP 35MM BLK TUNGSTEN

## (undated) DEVICE — CATH EP ACHV MPPNG 3.3FR 20MM -- ACHIEVE

## (undated) DEVICE — CATHETER US 8FR L90CM 4 W STEERING PRECIS FOR SIEMENS

## (undated) DEVICE — UNIDIRECTIONAL STEERABLE DIAGNOSTIC CATHETER: Brand: EP XT™

## (undated) DEVICE — NEEDLE,HYPODERM,SAFETY, 25GX1.5": Brand: MEDLINE

## (undated) DEVICE — 3M™ MEDIPORE™ H SOFT CLOTH SURGICAL TAPE 2864, 4 INCH X 10 YARD (10CM X 9,14M), 12 ROLLS/CASE: Brand: 3M™ MEDIPORE™

## (undated) DEVICE — SUTURE VCRL SZ 0 L36IN ABSRB UD L36MM CT-1 1/2 CIR J946H

## (undated) DEVICE — GLOVE SURG SZ 75 L114IN FNGR THK98MIL CUF THK75MIL CRM

## (undated) DEVICE — KIT,ANTI FOG,W/SPONGE & FLUID,SOFT PACK: Brand: MEDLINE

## (undated) DEVICE — TROCAR: Brand: KII SHIELDED BLADED ACCESS SYSTEM

## (undated) DEVICE — THIS PRODUCT IS SINGLE USE AND INTENDED TO BE USED FOR BLUNT DISSECTION OF TISSUE.: Brand: ASPEN® ENDOSCOPIC KITTNER, SINGLE TIP

## (undated) DEVICE — BLANKET WRM AD W50XL85.8IN PACU FULL BODY FORC AIR

## (undated) DEVICE — SHTH STEERABLE FLEXCATH 12 FR --

## (undated) DEVICE — DRAPE SURG W25XL50IN E OPN CIR BND BG

## (undated) DEVICE — TORFLEX TRANSSEPTAL SHEATH; TRANSSEPTAL DILATOR; J-TIP  GUIDEWIRE: Brand: TORFLEX TRANSSEPTAL GUIDING SHEATH

## (undated) DEVICE — STERILE (15.2 TAPERED TO 7.6 X 183CM) POLYETHYLENE ACCORDION-FOLDED COVER FOR USE WITH SIEMENS ACUNAV ULTRASOUND CATHETER FAMILY CONNECTOR: Brand: SWIFTLINK TRANSDUCER COVER

## (undated) DEVICE — DRAPE STRL ANGIO W/ 2 FLD COLLCTN PCH 86X135 218X343 CM 2

## (undated) DEVICE — BLADE ELECTRODE: Brand: EDGE

## (undated) DEVICE — INTRODUCER SHTH 7FR CANN L11CM DIL TIP 35MM ORNG TUNGSTEN

## (undated) DEVICE — MARYLAND JAW LAPAROSCOPIC SEALER/DIVIDER COATED: Brand: LIGASURE

## (undated) DEVICE — SPLINT WR VELC FOAM NEUT POS DISP FOR RAD ART ACC SFT STRP

## (undated) DEVICE — TUBE SET IRR PUMP THERMALCOOL -- SMARTABLATE

## (undated) DEVICE — SOFT SILICONE HYDROCELLULAR SACRUM DRESSING WITH LOCK AWAY LAYER: Brand: ALLEVYN LIFE SACRUM (LARGE) PACK OF 10

## (undated) DEVICE — SHEET, T, LAPAROTOMY, STERILE: Brand: MEDLINE

## (undated) DEVICE — SLEEVE COMPR STD 12 IN FOR 165IN CALF COMFORT VENODYNE SYS

## (undated) DEVICE — COVER US PRB W15XL120CM W/ GEL RUBBERBAND TAPE STRP FLD GEN

## (undated) DEVICE — CATHETER DIAG 5FR L100CM LUMN ID0.047IN JR4 CRV 0 SIDE H

## (undated) DEVICE — Device: Brand: PROTRACK PIGTAIL WIRE

## (undated) DEVICE — KENDALL RADIOLUCENT FOAM MONITORING ELECTRODE RECTANGULAR SHAPE: Brand: KENDALL

## (undated) DEVICE — SLEEVE RMFG Z-THREAD KII 3/BX -- LAWSON OEM ITEM 280412

## (undated) DEVICE — KIT ELECTRICAL UMBILICAL --

## (undated) DEVICE — GOWN,NON-REINFORCED,3XL: Brand: MEDLINE

## (undated) DEVICE — WATERPROOF, BACTERIA PROOF DRESSING WITH ABSORBENT SEE THROUGH PAD: Brand: OPSITE POST-OP VISIBLE 15X10CM CTN 20

## (undated) DEVICE — KIT COAX UMBILICAL FOR N20 --

## (undated) DEVICE — LAPAROSCOPIC TROCAR SLEEVE/SINGLE USE: Brand: KII® OPTICAL ACCESS SYSTEM

## (undated) DEVICE — SUT SLK 0 30IN FSL BLK --

## (undated) DEVICE — LIMB HOLDER, WRIST/ANKLE: Brand: DEROYAL

## (undated) DEVICE — GLIDESHEATH SLENDER STAINLESS STEEL KIT: Brand: GLIDESHEATH SLENDER

## (undated) DEVICE — CATHETER CRYOABLATION 28 MM ARCTIC FRONT ADV

## (undated) DEVICE — PACEMAKER PACK: Brand: MEDLINE INDUSTRIES, INC.

## (undated) DEVICE — CABLE PACE ALGTR CLP SAF 12FT --

## (undated) DEVICE — TUBING PRSS 48IN 1200PSI CLR HI PRSS INJ EXCITE FLX

## (undated) DEVICE — COPILOT BLEEDBACK CONTROL VALVE: Brand: COPILOT

## (undated) DEVICE — DRAPE EP LT SUBCLAV ENTRY SHLD SORBX

## (undated) DEVICE — MEDI-VAC YANK SUCT HNDL W/TPRD BULBOUS TIP: Brand: CARDINAL HEALTH

## (undated) DEVICE — WATERPROOF, BACTERIA PROOF DRESSING WITH ABSORBENT SEE THROUGH PAD: Brand: OPSITE POST-OP VISIBLE 10X8CM CTN 20

## (undated) DEVICE — TORCON NB ADVANTAGE CATHETER: Brand: TORCON NB

## (undated) DEVICE — CATHETER ELECTROPHYSIOLOGY CRD 2 5 MM 6 FRX120 CM SUPREME

## (undated) DEVICE — AMD ANTIMICROBIAL DRAIN SPONGES, 6 PLY, 0.2% POLYHEXAMETHYLENE BIGUANIDE HCI (PHMB): Brand: EXCILON

## (undated) DEVICE — DRAPE TWL SURG 16X26IN BLU ORB04] ALLCARE INC]

## (undated) DEVICE — TUBING PRSS MON L12IN PVC RIG NONEXPANDING M TO FEM CONN

## (undated) DEVICE — PLASMABLADE PS200-040 4.0: Brand: PLASMABLADE™

## (undated) DEVICE — Device: Brand: NRG TRANSSEPTAL NEEDLE

## (undated) DEVICE — GOWN,SIRUS,POLYRNF,SETINSLV,XL,20/CS: Brand: MEDLINE

## (undated) DEVICE — 3M™ IOBAN™ 2 ANTIMICROBIAL INCISE DRAPE 6650EZ: Brand: IOBAN™ 2